# Patient Record
Sex: FEMALE | Race: BLACK OR AFRICAN AMERICAN | NOT HISPANIC OR LATINO | Employment: OTHER | URBAN - METROPOLITAN AREA
[De-identification: names, ages, dates, MRNs, and addresses within clinical notes are randomized per-mention and may not be internally consistent; named-entity substitution may affect disease eponyms.]

---

## 2018-07-25 ENCOUNTER — HOSPITAL ENCOUNTER (INPATIENT)
Facility: HOSPITAL | Age: 83
LOS: 4 days | Discharge: HOME | End: 2018-07-30
Attending: EMERGENCY MEDICINE | Admitting: INTERNAL MEDICINE

## 2018-07-25 ENCOUNTER — APPOINTMENT (OUTPATIENT)
Dept: GENERAL RADIOLOGY | Facility: HOSPITAL | Age: 83
End: 2018-07-25

## 2018-07-25 DIAGNOSIS — R07.89 ATYPICAL CHEST PAIN: ICD-10-CM

## 2018-07-25 DIAGNOSIS — N28.9 ACUTE RENAL INSUFFICIENCY: ICD-10-CM

## 2018-07-25 DIAGNOSIS — I16.0 HYPERTENSIVE URGENCY: ICD-10-CM

## 2018-07-25 DIAGNOSIS — Z86.79 HISTORY OF HYPERTENSION: ICD-10-CM

## 2018-07-25 DIAGNOSIS — I21.4 NON-ST ELEVATED MYOCARDIAL INFARCTION (NON-STEMI) (HCC): Primary | ICD-10-CM

## 2018-07-25 DIAGNOSIS — E11.9 TYPE 2 DIABETES MELLITUS WITHOUT COMPLICATION, WITHOUT LONG-TERM CURRENT USE OF INSULIN (HCC): ICD-10-CM

## 2018-07-25 DIAGNOSIS — I10 ESSENTIAL HYPERTENSION: ICD-10-CM

## 2018-07-25 DIAGNOSIS — F17.200 TOBACCO DEPENDENCE: ICD-10-CM

## 2018-07-25 DIAGNOSIS — E78.2 MIXED HYPERLIPIDEMIA: ICD-10-CM

## 2018-07-25 PROBLEM — E78.5 HYPERLIPEMIA: Status: ACTIVE | Noted: 2018-07-25

## 2018-07-25 PROBLEM — N17.9 AKI (ACUTE KIDNEY INJURY) (HCC): Status: ACTIVE | Noted: 2018-07-25

## 2018-07-25 PROBLEM — Z72.0 TOBACCO ABUSE: Status: ACTIVE | Noted: 2018-07-25

## 2018-07-25 LAB
ALBUMIN SERPL-MCNC: 4.75 G/DL (ref 3.2–4.8)
ALBUMIN/GLOB SERPL: 1.2 G/DL (ref 1.5–2.5)
ALP SERPL-CCNC: 112 U/L (ref 25–100)
ALT SERPL W P-5'-P-CCNC: 20 U/L (ref 7–40)
ANION GAP SERPL CALCULATED.3IONS-SCNC: 10 MMOL/L (ref 3–11)
AST SERPL-CCNC: 49 U/L (ref 0–33)
BASOPHILS # BLD AUTO: 0.03 10*3/MM3 (ref 0–0.2)
BASOPHILS NFR BLD AUTO: 0.3 % (ref 0–1)
BILIRUB SERPL-MCNC: 0.6 MG/DL (ref 0.3–1.2)
BNP SERPL-MCNC: 1415 PG/ML (ref 0–100)
BUN BLD-MCNC: 24 MG/DL (ref 9–23)
BUN/CREAT SERPL: 13.9 (ref 7–25)
CALCIUM SPEC-SCNC: 10 MG/DL (ref 8.7–10.4)
CHLORIDE SERPL-SCNC: 107 MMOL/L (ref 99–109)
CO2 SERPL-SCNC: 23 MMOL/L (ref 20–31)
CREAT BLD-MCNC: 1.73 MG/DL (ref 0.6–1.3)
DEPRECATED RDW RBC AUTO: 46.5 FL (ref 37–54)
EOSINOPHIL # BLD AUTO: 0.16 10*3/MM3 (ref 0–0.3)
EOSINOPHIL NFR BLD AUTO: 1.7 % (ref 0–3)
ERYTHROCYTE [DISTWIDTH] IN BLOOD BY AUTOMATED COUNT: 14.5 % (ref 11.3–14.5)
GFR SERPL CREATININE-BSD FRML MDRD: 34 ML/MIN/1.73
GLOBULIN UR ELPH-MCNC: 4 GM/DL
GLUCOSE BLD-MCNC: 174 MG/DL (ref 70–100)
HCT VFR BLD AUTO: 44.3 % (ref 34.5–44)
HGB BLD-MCNC: 15 G/DL (ref 11.5–15.5)
HOLD SPECIMEN: NORMAL
HOLD SPECIMEN: NORMAL
IMM GRANULOCYTES # BLD: 0.04 10*3/MM3 (ref 0–0.03)
IMM GRANULOCYTES NFR BLD: 0.4 % (ref 0–0.6)
LIPASE SERPL-CCNC: 26 U/L (ref 6–51)
LYMPHOCYTES # BLD AUTO: 3.43 10*3/MM3 (ref 0.6–4.8)
LYMPHOCYTES NFR BLD AUTO: 35.4 % (ref 24–44)
MCH RBC QN AUTO: 29.8 PG (ref 27–31)
MCHC RBC AUTO-ENTMCNC: 33.9 G/DL (ref 32–36)
MCV RBC AUTO: 87.9 FL (ref 80–99)
MONOCYTES # BLD AUTO: 0.4 10*3/MM3 (ref 0–1)
MONOCYTES NFR BLD AUTO: 4.1 % (ref 0–12)
NEUTROPHILS # BLD AUTO: 5.67 10*3/MM3 (ref 1.5–8.3)
NEUTROPHILS NFR BLD AUTO: 58.5 % (ref 41–71)
PLAT MORPH BLD: NORMAL
PLATELET # BLD AUTO: 384 10*3/MM3 (ref 150–450)
PMV BLD AUTO: 11.6 FL (ref 6–12)
POTASSIUM BLD-SCNC: 5.1 MMOL/L (ref 3.5–5.5)
PROT SERPL-MCNC: 8.7 G/DL (ref 5.7–8.2)
RBC # BLD AUTO: 5.04 10*6/MM3 (ref 3.89–5.14)
RBC MORPH BLD: NORMAL
SODIUM BLD-SCNC: 140 MMOL/L (ref 132–146)
TROPONIN I SERPL-MCNC: 0.95 NG/ML (ref 0–0.07)
TROPONIN I SERPL-MCNC: 0.96 NG/ML (ref 0–0.07)
WBC MORPH BLD: NORMAL
WBC NRBC COR # BLD: 9.69 10*3/MM3 (ref 3.5–10.8)
WHOLE BLOOD HOLD SPECIMEN: NORMAL
WHOLE BLOOD HOLD SPECIMEN: NORMAL

## 2018-07-25 PROCEDURE — 85007 BL SMEAR W/DIFF WBC COUNT: CPT | Performed by: EMERGENCY MEDICINE

## 2018-07-25 PROCEDURE — 93005 ELECTROCARDIOGRAM TRACING: CPT | Performed by: PHYSICIAN ASSISTANT

## 2018-07-25 PROCEDURE — 83690 ASSAY OF LIPASE: CPT | Performed by: EMERGENCY MEDICINE

## 2018-07-25 PROCEDURE — 99223 1ST HOSP IP/OBS HIGH 75: CPT | Performed by: FAMILY MEDICINE

## 2018-07-25 PROCEDURE — 84484 ASSAY OF TROPONIN QUANT: CPT

## 2018-07-25 PROCEDURE — 99284 EMERGENCY DEPT VISIT MOD MDM: CPT

## 2018-07-25 PROCEDURE — 80053 COMPREHEN METABOLIC PANEL: CPT | Performed by: EMERGENCY MEDICINE

## 2018-07-25 PROCEDURE — 71045 X-RAY EXAM CHEST 1 VIEW: CPT

## 2018-07-25 PROCEDURE — 99406 BEHAV CHNG SMOKING 3-10 MIN: CPT | Performed by: PHYSICIAN ASSISTANT

## 2018-07-25 PROCEDURE — 93005 ELECTROCARDIOGRAM TRACING: CPT

## 2018-07-25 PROCEDURE — 83880 ASSAY OF NATRIURETIC PEPTIDE: CPT | Performed by: EMERGENCY MEDICINE

## 2018-07-25 PROCEDURE — 25010000002 ENOXAPARIN PER 10 MG: Performed by: PHYSICIAN ASSISTANT

## 2018-07-25 PROCEDURE — 93005 ELECTROCARDIOGRAM TRACING: CPT | Performed by: EMERGENCY MEDICINE

## 2018-07-25 PROCEDURE — 85025 COMPLETE CBC W/AUTO DIFF WBC: CPT | Performed by: EMERGENCY MEDICINE

## 2018-07-25 RX ORDER — METOPROLOL TARTRATE 5 MG/5ML
5 INJECTION INTRAVENOUS ONCE
Status: COMPLETED | OUTPATIENT
Start: 2018-07-25 | End: 2018-07-25

## 2018-07-25 RX ORDER — CLONIDINE HYDROCHLORIDE 0.1 MG/1
0.2 TABLET ORAL ONCE
Status: COMPLETED | OUTPATIENT
Start: 2018-07-25 | End: 2018-07-25

## 2018-07-25 RX ORDER — CLOPIDOGREL BISULFATE 75 MG/1
600 TABLET ORAL ONCE
Status: COMPLETED | OUTPATIENT
Start: 2018-07-25 | End: 2018-07-25

## 2018-07-25 RX ORDER — ATENOLOL 25 MG/1
25 TABLET ORAL DAILY
COMMUNITY
End: 2018-07-30 | Stop reason: HOSPADM

## 2018-07-25 RX ORDER — SODIUM CHLORIDE 0.9 % (FLUSH) 0.9 %
10 SYRINGE (ML) INJECTION AS NEEDED
Status: DISCONTINUED | OUTPATIENT
Start: 2018-07-25 | End: 2018-07-30 | Stop reason: HOSPADM

## 2018-07-25 RX ORDER — AMLODIPINE BESYLATE 10 MG/1
10 TABLET ORAL DAILY
COMMUNITY
End: 2018-08-29 | Stop reason: SDUPTHER

## 2018-07-25 RX ORDER — ASPIRIN 81 MG/1
324 TABLET, CHEWABLE ORAL ONCE
Status: COMPLETED | OUTPATIENT
Start: 2018-07-25 | End: 2018-07-25

## 2018-07-25 RX ADMIN — METOPROLOL TARTRATE 5 MG: 1 INJECTION, SOLUTION INTRAVENOUS at 20:44

## 2018-07-25 RX ADMIN — ASPIRIN 81 MG 243 MG: 81 TABLET ORAL at 19:56

## 2018-07-25 RX ADMIN — ENOXAPARIN SODIUM 60 MG: 60 INJECTION SUBCUTANEOUS at 20:44

## 2018-07-25 RX ADMIN — CLOPIDOGREL BISULFATE 600 MG: 75 TABLET ORAL at 20:43

## 2018-07-25 RX ADMIN — CLONIDINE HYDROCHLORIDE 0.2 MG: 0.1 TABLET ORAL at 20:15

## 2018-07-26 ENCOUNTER — APPOINTMENT (OUTPATIENT)
Dept: CARDIOLOGY | Facility: HOSPITAL | Age: 83
End: 2018-07-26

## 2018-07-26 PROBLEM — I16.1 HYPERTENSIVE EMERGENCY: Status: ACTIVE | Noted: 2018-07-25

## 2018-07-26 PROBLEM — I21.4 NON-ST ELEVATED MYOCARDIAL INFARCTION (NON-STEMI) (HCC): Status: ACTIVE | Noted: 2018-07-26

## 2018-07-26 LAB
ANION GAP SERPL CALCULATED.3IONS-SCNC: 14 MMOL/L (ref 3–11)
APTT PPP: 30.1 SECONDS (ref 55–70)
APTT PPP: 58 SECONDS (ref 55–70)
ARTICHOKE IGE QN: 184 MG/DL (ref 0–130)
BASOPHILS # BLD AUTO: 0.04 10*3/MM3 (ref 0–0.2)
BASOPHILS NFR BLD AUTO: 0.5 % (ref 0–1)
BH CV ECHO MEAS - AO ROOT AREA (BSA CORRECTED): 1.7
BH CV ECHO MEAS - AO ROOT AREA: 6 CM^2
BH CV ECHO MEAS - AO ROOT DIAM: 2.8 CM
BH CV ECHO MEAS - BSA(HAYCOCK): 1.6 M^2
BH CV ECHO MEAS - BSA: 1.6 M^2
BH CV ECHO MEAS - BZI_BMI: 23.4 KILOGRAMS/M^2
BH CV ECHO MEAS - BZI_METRIC_HEIGHT: 157.5 CM
BH CV ECHO MEAS - BZI_METRIC_WEIGHT: 58.1 KG
BH CV ECHO MEAS - CONTRAST EF (2CH): 29 ML/M^2
BH CV ECHO MEAS - CONTRAST EF 4CH: 28.1 ML/M^2
BH CV ECHO MEAS - EDV(CUBED): 37.9 ML
BH CV ECHO MEAS - EDV(MOD-SP2): 62 ML
BH CV ECHO MEAS - EDV(MOD-SP4): 57 ML
BH CV ECHO MEAS - EDV(TEICH): 46 ML
BH CV ECHO MEAS - EF(CUBED): 54 %
BH CV ECHO MEAS - EF(MOD-BP): 27 %
BH CV ECHO MEAS - EF(MOD-SP2): 29 %
BH CV ECHO MEAS - EF(MOD-SP4): 28.1 %
BH CV ECHO MEAS - EF(TEICH): 47 %
BH CV ECHO MEAS - ESV(CUBED): 17.4 ML
BH CV ECHO MEAS - ESV(MOD-SP2): 44 ML
BH CV ECHO MEAS - ESV(MOD-SP4): 41 ML
BH CV ECHO MEAS - ESV(TEICH): 24.4 ML
BH CV ECHO MEAS - FS: 22.8 %
BH CV ECHO MEAS - IVS/LVPW: 0.9
BH CV ECHO MEAS - IVSD: 1.2 CM
BH CV ECHO MEAS - LA DIMENSION: 3.5 CM
BH CV ECHO MEAS - LA/AO: 1.3
BH CV ECHO MEAS - LAT PEAK E' VEL: 4 CM/SEC
BH CV ECHO MEAS - LV DIASTOLIC VOL/BSA (35-75): 36 ML/M^2
BH CV ECHO MEAS - LV MASS(C)D: 142.2 GRAMS
BH CV ECHO MEAS - LV MASS(C)DI: 89.9 GRAMS/M^2
BH CV ECHO MEAS - LV MAX PG: 1.6 MMHG
BH CV ECHO MEAS - LV MEAN PG: 1 MMHG
BH CV ECHO MEAS - LV SYSTOLIC VOL/BSA (12-30): 25.9 ML/M^2
BH CV ECHO MEAS - LV V1 MAX: 63.2 CM/SEC
BH CV ECHO MEAS - LV V1 MEAN: 47.4 CM/SEC
BH CV ECHO MEAS - LV V1 VTI: 14.8 CM
BH CV ECHO MEAS - LVIDD: 3.4 CM
BH CV ECHO MEAS - LVIDS: 2.6 CM
BH CV ECHO MEAS - LVLD AP2: 7.1 CM
BH CV ECHO MEAS - LVLD AP4: 7.2 CM
BH CV ECHO MEAS - LVLS AP2: 6.7 CM
BH CV ECHO MEAS - LVLS AP4: 6.6 CM
BH CV ECHO MEAS - LVOT AREA (M): 3.1 CM^2
BH CV ECHO MEAS - LVOT AREA: 3.1 CM^2
BH CV ECHO MEAS - LVOT DIAM: 2 CM
BH CV ECHO MEAS - LVPWD: 1.4 CM
BH CV ECHO MEAS - MED PEAK E' VEL: 4.15 CM/SEC
BH CV ECHO MEAS - MV A MAX VEL: 106.6 CM/SEC
BH CV ECHO MEAS - MV E MAX VEL: 74 CM/SEC
BH CV ECHO MEAS - MV E/A: 0.69
BH CV ECHO MEAS - RVDD: 2.2 CM
BH CV ECHO MEAS - SI(CUBED): 12.9 ML/M^2
BH CV ECHO MEAS - SI(LVOT): 28.6 ML/M^2
BH CV ECHO MEAS - SI(MOD-SP2): 11.4 ML/M^2
BH CV ECHO MEAS - SI(MOD-SP4): 10.1 ML/M^2
BH CV ECHO MEAS - SI(TEICH): 13.7 ML/M^2
BH CV ECHO MEAS - SV(CUBED): 20.4 ML
BH CV ECHO MEAS - SV(LVOT): 45.3 ML
BH CV ECHO MEAS - SV(MOD-SP2): 18 ML
BH CV ECHO MEAS - SV(MOD-SP4): 16 ML
BH CV ECHO MEAS - SV(TEICH): 21.6 ML
BH CV ECHO MEAS - TAPSE (>1.6): 1.1 CM2
BH CV ECHO MEASUREMENTS AVERAGE E/E' RATIO: 18.16
BH CV VAS BP LEFT ARM: NORMAL MMHG
BH CV XLRA - RV BASE: 2.7 CM
BH CV XLRA - RV LENGTH: 5.7 CM
BH CV XLRA - RV MID: 1.9 CM
BH CV XLRA - TDI S': 6.92 CM/SEC
BUN BLD-MCNC: 23 MG/DL (ref 9–23)
BUN/CREAT SERPL: 15.9 (ref 7–25)
CALCIUM SPEC-SCNC: 9.4 MG/DL (ref 8.7–10.4)
CHLORIDE SERPL-SCNC: 111 MMOL/L (ref 99–109)
CHOLEST SERPL-MCNC: 237 MG/DL (ref 0–200)
CO2 SERPL-SCNC: 17 MMOL/L (ref 20–31)
CREAT BLD-MCNC: 1.45 MG/DL (ref 0.6–1.3)
DEPRECATED RDW RBC AUTO: 46.5 FL (ref 37–54)
EOSINOPHIL # BLD AUTO: 0.27 10*3/MM3 (ref 0–0.3)
EOSINOPHIL NFR BLD AUTO: 3.4 % (ref 0–3)
ERYTHROCYTE [DISTWIDTH] IN BLOOD BY AUTOMATED COUNT: 14.5 % (ref 11.3–14.5)
GFR SERPL CREATININE-BSD FRML MDRD: 41 ML/MIN/1.73
GLUCOSE BLD-MCNC: 153 MG/DL (ref 70–100)
GLUCOSE BLDC GLUCOMTR-MCNC: 155 MG/DL (ref 70–130)
GLUCOSE BLDC GLUCOMTR-MCNC: 168 MG/DL (ref 70–130)
GLUCOSE BLDC GLUCOMTR-MCNC: 223 MG/DL (ref 70–130)
GLUCOSE BLDC GLUCOMTR-MCNC: 244 MG/DL (ref 70–130)
HBA1C MFR BLD: 9.2 % (ref 4.8–5.6)
HCT VFR BLD AUTO: 35.8 % (ref 34.5–44)
HDLC SERPL-MCNC: 40 MG/DL (ref 40–60)
HGB BLD-MCNC: 11.8 G/DL (ref 11.5–15.5)
IMM GRANULOCYTES # BLD: 0.02 10*3/MM3 (ref 0–0.03)
IMM GRANULOCYTES NFR BLD: 0.3 % (ref 0–0.6)
INR PPP: 1.17 (ref 0.91–1.09)
LYMPHOCYTES # BLD AUTO: 2.81 10*3/MM3 (ref 0.6–4.8)
LYMPHOCYTES NFR BLD AUTO: 35.3 % (ref 24–44)
MAGNESIUM SERPL-MCNC: 1.9 MG/DL (ref 1.3–2.7)
MAXIMAL PREDICTED HEART RATE: 134 BPM
MCH RBC QN AUTO: 29.1 PG (ref 27–31)
MCHC RBC AUTO-ENTMCNC: 33 G/DL (ref 32–36)
MCV RBC AUTO: 88.4 FL (ref 80–99)
MONOCYTES # BLD AUTO: 0.39 10*3/MM3 (ref 0–1)
MONOCYTES NFR BLD AUTO: 4.9 % (ref 0–12)
NEUTROPHILS # BLD AUTO: 4.46 10*3/MM3 (ref 1.5–8.3)
NEUTROPHILS NFR BLD AUTO: 55.9 % (ref 41–71)
PLATELET # BLD AUTO: 322 10*3/MM3 (ref 150–450)
PMV BLD AUTO: 11.5 FL (ref 6–12)
POTASSIUM BLD-SCNC: 4.4 MMOL/L (ref 3.5–5.5)
PROTHROMBIN TIME: 12.3 SECONDS (ref 9.6–11.5)
RBC # BLD AUTO: 4.05 10*6/MM3 (ref 3.89–5.14)
SODIUM BLD-SCNC: 142 MMOL/L (ref 132–146)
STRESS TARGET HR: 114 BPM
TRIGL SERPL-MCNC: 179 MG/DL (ref 0–150)
TROPONIN I SERPL-MCNC: 1.22 NG/ML
TSH SERPL DL<=0.05 MIU/L-ACNC: 1.08 MIU/ML (ref 0.35–5.35)
WBC NRBC COR # BLD: 7.97 10*3/MM3 (ref 3.5–10.8)

## 2018-07-26 PROCEDURE — 63710000001 INSULIN LISPRO (HUMAN) PER 5 UNITS: Performed by: PHYSICIAN ASSISTANT

## 2018-07-26 PROCEDURE — 25010000002 HEPARIN (PORCINE) PER 1000 UNITS: Performed by: INTERNAL MEDICINE

## 2018-07-26 PROCEDURE — 85610 PROTHROMBIN TIME: CPT | Performed by: INTERNAL MEDICINE

## 2018-07-26 PROCEDURE — 93010 ELECTROCARDIOGRAM REPORT: CPT | Performed by: INTERNAL MEDICINE

## 2018-07-26 PROCEDURE — 83735 ASSAY OF MAGNESIUM: CPT | Performed by: PHYSICIAN ASSISTANT

## 2018-07-26 PROCEDURE — 99291 CRITICAL CARE FIRST HOUR: CPT | Performed by: INTERNAL MEDICINE

## 2018-07-26 PROCEDURE — 83036 HEMOGLOBIN GLYCOSYLATED A1C: CPT | Performed by: PHYSICIAN ASSISTANT

## 2018-07-26 PROCEDURE — 80048 BASIC METABOLIC PNL TOTAL CA: CPT | Performed by: INTERNAL MEDICINE

## 2018-07-26 PROCEDURE — 80061 LIPID PANEL: CPT | Performed by: PHYSICIAN ASSISTANT

## 2018-07-26 PROCEDURE — 93306 TTE W/DOPPLER COMPLETE: CPT

## 2018-07-26 PROCEDURE — 99222 1ST HOSP IP/OBS MODERATE 55: CPT | Performed by: INTERNAL MEDICINE

## 2018-07-26 PROCEDURE — 85025 COMPLETE CBC W/AUTO DIFF WBC: CPT | Performed by: INTERNAL MEDICINE

## 2018-07-26 PROCEDURE — 85730 THROMBOPLASTIN TIME PARTIAL: CPT | Performed by: INTERNAL MEDICINE

## 2018-07-26 PROCEDURE — 82962 GLUCOSE BLOOD TEST: CPT

## 2018-07-26 PROCEDURE — 93005 ELECTROCARDIOGRAM TRACING: CPT | Performed by: PHYSICIAN ASSISTANT

## 2018-07-26 PROCEDURE — 93306 TTE W/DOPPLER COMPLETE: CPT | Performed by: INTERNAL MEDICINE

## 2018-07-26 PROCEDURE — 84443 ASSAY THYROID STIM HORMONE: CPT | Performed by: PHYSICIAN ASSISTANT

## 2018-07-26 PROCEDURE — 84484 ASSAY OF TROPONIN QUANT: CPT | Performed by: PHYSICIAN ASSISTANT

## 2018-07-26 RX ORDER — AMLODIPINE BESYLATE 10 MG/1
10 TABLET ORAL DAILY
Status: DISCONTINUED | OUTPATIENT
Start: 2018-07-26 | End: 2018-07-30 | Stop reason: HOSPADM

## 2018-07-26 RX ORDER — CARVEDILOL 6.25 MG/1
6.25 TABLET ORAL EVERY 12 HOURS SCHEDULED
Status: DISCONTINUED | OUTPATIENT
Start: 2018-07-26 | End: 2018-07-30 | Stop reason: HOSPADM

## 2018-07-26 RX ORDER — NICOTINE POLACRILEX 4 MG
15 LOZENGE BUCCAL
Status: DISCONTINUED | OUTPATIENT
Start: 2018-07-26 | End: 2018-07-30 | Stop reason: HOSPADM

## 2018-07-26 RX ORDER — SODIUM CHLORIDE 0.9 % (FLUSH) 0.9 %
1-10 SYRINGE (ML) INJECTION AS NEEDED
Status: DISCONTINUED | OUTPATIENT
Start: 2018-07-26 | End: 2018-07-30 | Stop reason: HOSPADM

## 2018-07-26 RX ORDER — ASPIRIN 81 MG/1
81 TABLET, CHEWABLE ORAL DAILY
Status: DISCONTINUED | OUTPATIENT
Start: 2018-07-26 | End: 2018-07-27

## 2018-07-26 RX ORDER — NITROGLYCERIN 20 MG/100ML
5-200 INJECTION INTRAVENOUS
Status: DISCONTINUED | OUTPATIENT
Start: 2018-07-26 | End: 2018-07-28

## 2018-07-26 RX ORDER — ATENOLOL 25 MG/1
25 TABLET ORAL DAILY
Status: DISCONTINUED | OUTPATIENT
Start: 2018-07-26 | End: 2018-07-26

## 2018-07-26 RX ORDER — SODIUM CHLORIDE 9 MG/ML
75 INJECTION, SOLUTION INTRAVENOUS CONTINUOUS
Status: ACTIVE | OUTPATIENT
Start: 2018-07-26 | End: 2018-07-26

## 2018-07-26 RX ORDER — ACETAMINOPHEN 325 MG/1
650 TABLET ORAL EVERY 4 HOURS PRN
Status: DISCONTINUED | OUTPATIENT
Start: 2018-07-26 | End: 2018-07-30 | Stop reason: HOSPADM

## 2018-07-26 RX ORDER — CLOPIDOGREL BISULFATE 75 MG/1
75 TABLET ORAL DAILY
Status: DISCONTINUED | OUTPATIENT
Start: 2018-07-26 | End: 2018-07-30 | Stop reason: HOSPADM

## 2018-07-26 RX ORDER — ATORVASTATIN CALCIUM 40 MG/1
80 TABLET, FILM COATED ORAL NIGHTLY
Status: DISCONTINUED | OUTPATIENT
Start: 2018-07-26 | End: 2018-07-26

## 2018-07-26 RX ORDER — ASPIRIN 81 MG/1
81 TABLET ORAL DAILY
Status: DISCONTINUED | OUTPATIENT
Start: 2018-07-27 | End: 2018-07-30 | Stop reason: HOSPADM

## 2018-07-26 RX ORDER — ATORVASTATIN CALCIUM 40 MG/1
40 TABLET, FILM COATED ORAL NIGHTLY
Status: DISCONTINUED | OUTPATIENT
Start: 2018-07-26 | End: 2018-07-28

## 2018-07-26 RX ORDER — CARVEDILOL 12.5 MG/1
12.5 TABLET ORAL EVERY 12 HOURS SCHEDULED
Status: DISCONTINUED | OUTPATIENT
Start: 2018-07-26 | End: 2018-07-26

## 2018-07-26 RX ORDER — CLONIDINE HYDROCHLORIDE 0.2 MG/1
0.2 TABLET ORAL ONCE
Status: COMPLETED | OUTPATIENT
Start: 2018-07-26 | End: 2018-07-26

## 2018-07-26 RX ORDER — DEXTROSE MONOHYDRATE 25 G/50ML
25 INJECTION, SOLUTION INTRAVENOUS
Status: DISCONTINUED | OUTPATIENT
Start: 2018-07-26 | End: 2018-07-30 | Stop reason: HOSPADM

## 2018-07-26 RX ORDER — ASPIRIN 81 MG/1
81 TABLET, CHEWABLE ORAL DAILY
COMMUNITY

## 2018-07-26 RX ADMIN — INSULIN LISPRO 2 UNITS: 100 INJECTION, SOLUTION INTRAVENOUS; SUBCUTANEOUS at 08:40

## 2018-07-26 RX ADMIN — HEPARIN SODIUM 12 UNITS/KG/HR: 10000 INJECTION, SOLUTION INTRAVENOUS at 15:39

## 2018-07-26 RX ADMIN — ASPIRIN 81 MG 81 MG: 81 TABLET ORAL at 08:40

## 2018-07-26 RX ADMIN — INSULIN LISPRO 3 UNITS: 100 INJECTION, SOLUTION INTRAVENOUS; SUBCUTANEOUS at 13:04

## 2018-07-26 RX ADMIN — NITROGLYCERIN 1 INCH: 20 OINTMENT TOPICAL at 04:03

## 2018-07-26 RX ADMIN — INSULIN LISPRO 2 UNITS: 100 INJECTION, SOLUTION INTRAVENOUS; SUBCUTANEOUS at 17:54

## 2018-07-26 RX ADMIN — NITROGLYCERIN 5 MCG/MIN: 20 INJECTION INTRAVENOUS at 10:23

## 2018-07-26 RX ADMIN — AMLODIPINE BESYLATE 10 MG: 10 TABLET ORAL at 08:40

## 2018-07-26 RX ADMIN — ATENOLOL 25 MG: 25 TABLET ORAL at 08:40

## 2018-07-26 RX ADMIN — CLONIDINE HYDROCHLORIDE 0.2 MG: 0.2 TABLET ORAL at 05:28

## 2018-07-26 RX ADMIN — CLOPIDOGREL BISULFATE 75 MG: 75 TABLET ORAL at 11:06

## 2018-07-26 RX ADMIN — INSULIN LISPRO 3 UNITS: 100 INJECTION, SOLUTION INTRAVENOUS; SUBCUTANEOUS at 20:28

## 2018-07-27 LAB
ANION GAP SERPL CALCULATED.3IONS-SCNC: 8 MMOL/L (ref 3–11)
APTT PPP: 70.9 SECONDS (ref 55–70)
APTT PPP: 72.1 SECONDS (ref 55–70)
BUN BLD-MCNC: 26 MG/DL (ref 9–23)
BUN/CREAT SERPL: 18.2 (ref 7–25)
CALCIUM SPEC-SCNC: 9 MG/DL (ref 8.7–10.4)
CHLORIDE SERPL-SCNC: 111 MMOL/L (ref 99–109)
CO2 SERPL-SCNC: 20 MMOL/L (ref 20–31)
CREAT BLD-MCNC: 1.43 MG/DL (ref 0.6–1.3)
GFR SERPL CREATININE-BSD FRML MDRD: 42 ML/MIN/1.73
GLUCOSE BLD-MCNC: 106 MG/DL (ref 70–100)
GLUCOSE BLDC GLUCOMTR-MCNC: 107 MG/DL (ref 70–130)
GLUCOSE BLDC GLUCOMTR-MCNC: 180 MG/DL (ref 70–130)
GLUCOSE BLDC GLUCOMTR-MCNC: 280 MG/DL (ref 70–130)
GLUCOSE BLDC GLUCOMTR-MCNC: 360 MG/DL (ref 70–130)
POTASSIUM BLD-SCNC: 4.1 MMOL/L (ref 3.5–5.5)
SODIUM BLD-SCNC: 139 MMOL/L (ref 132–146)
TROPONIN I SERPL-MCNC: 1.04 NG/ML

## 2018-07-27 PROCEDURE — 4A023N7 MEASUREMENT OF CARDIAC SAMPLING AND PRESSURE, LEFT HEART, PERCUTANEOUS APPROACH: ICD-10-PCS | Performed by: INTERNAL MEDICINE

## 2018-07-27 PROCEDURE — C9600 PERC DRUG-EL COR STENT SING: HCPCS | Performed by: INTERNAL MEDICINE

## 2018-07-27 PROCEDURE — 027034Z DILATION OF CORONARY ARTERY, ONE ARTERY WITH DRUG-ELUTING INTRALUMINAL DEVICE, PERCUTANEOUS APPROACH: ICD-10-PCS | Performed by: INTERNAL MEDICINE

## 2018-07-27 PROCEDURE — 99232 SBSQ HOSP IP/OBS MODERATE 35: CPT | Performed by: NURSE PRACTITIONER

## 2018-07-27 PROCEDURE — 80048 BASIC METABOLIC PNL TOTAL CA: CPT | Performed by: INTERNAL MEDICINE

## 2018-07-27 PROCEDURE — 92978 ENDOLUMINL IVUS OCT C 1ST: CPT | Performed by: INTERNAL MEDICINE

## 2018-07-27 PROCEDURE — 82962 GLUCOSE BLOOD TEST: CPT

## 2018-07-27 PROCEDURE — 63710000001 PREDNISONE PER 5 MG: Performed by: INTERNAL MEDICINE

## 2018-07-27 PROCEDURE — 25010000002 HYDRALAZINE PER 20 MG: Performed by: INTERNAL MEDICINE

## 2018-07-27 PROCEDURE — 92928 PRQ TCAT PLMT NTRAC ST 1 LES: CPT | Performed by: INTERNAL MEDICINE

## 2018-07-27 PROCEDURE — 63710000001 PREDNISONE PER 1 MG: Performed by: INTERNAL MEDICINE

## 2018-07-27 PROCEDURE — C1725 CATH, TRANSLUMIN NON-LASER: HCPCS | Performed by: INTERNAL MEDICINE

## 2018-07-27 PROCEDURE — C1887 CATHETER, GUIDING: HCPCS | Performed by: INTERNAL MEDICINE

## 2018-07-27 PROCEDURE — 0 IOPAMIDOL PER 1 ML: Performed by: INTERNAL MEDICINE

## 2018-07-27 PROCEDURE — C1894 INTRO/SHEATH, NON-LASER: HCPCS | Performed by: INTERNAL MEDICINE

## 2018-07-27 PROCEDURE — 99024 POSTOP FOLLOW-UP VISIT: CPT | Performed by: INTERNAL MEDICINE

## 2018-07-27 PROCEDURE — 25010000002 DIPHENHYDRAMINE PER 50 MG: Performed by: INTERNAL MEDICINE

## 2018-07-27 PROCEDURE — C1769 GUIDE WIRE: HCPCS | Performed by: INTERNAL MEDICINE

## 2018-07-27 PROCEDURE — C1753 CATH, INTRAVAS ULTRASOUND: HCPCS | Performed by: INTERNAL MEDICINE

## 2018-07-27 PROCEDURE — 93458 L HRT ARTERY/VENTRICLE ANGIO: CPT | Performed by: INTERNAL MEDICINE

## 2018-07-27 PROCEDURE — 25010000002 BIVALIRUDIN TRIFLUOROACETATE 250 MG RECONSTITUTED SOLUTION 1 EACH VIAL: Performed by: INTERNAL MEDICINE

## 2018-07-27 PROCEDURE — 84484 ASSAY OF TROPONIN QUANT: CPT | Performed by: INTERNAL MEDICINE

## 2018-07-27 PROCEDURE — 25010000002 FENTANYL CITRATE (PF) 100 MCG/2ML SOLUTION: Performed by: INTERNAL MEDICINE

## 2018-07-27 PROCEDURE — 25010000002 MIDAZOLAM PER 1 MG: Performed by: INTERNAL MEDICINE

## 2018-07-27 PROCEDURE — B240ZZ3 ULTRASONOGRAPHY OF SINGLE CORONARY ARTERY, INTRAVASCULAR: ICD-10-PCS | Performed by: INTERNAL MEDICINE

## 2018-07-27 PROCEDURE — 85730 THROMBOPLASTIN TIME PARTIAL: CPT

## 2018-07-27 PROCEDURE — B2111ZZ FLUOROSCOPY OF MULTIPLE CORONARY ARTERIES USING LOW OSMOLAR CONTRAST: ICD-10-PCS | Performed by: INTERNAL MEDICINE

## 2018-07-27 PROCEDURE — 75630 X-RAY AORTA LEG ARTERIES: CPT | Performed by: INTERNAL MEDICINE

## 2018-07-27 PROCEDURE — C1874 STENT, COATED/COV W/DEL SYS: HCPCS | Performed by: INTERNAL MEDICINE

## 2018-07-27 DEVICE — XIENCE SIERRA™ EVEROLIMUS ELUTING CORONARY STENT SYSTEM 2.50 MM X 38 MM / RAPID-EXCHANGE
Type: IMPLANTABLE DEVICE | Status: FUNCTIONAL
Brand: XIENCE SIERRA™

## 2018-07-27 RX ORDER — LIDOCAINE HYDROCHLORIDE 10 MG/ML
INJECTION, SOLUTION EPIDURAL; INFILTRATION; INTRACAUDAL; PERINEURAL AS NEEDED
Status: DISCONTINUED | OUTPATIENT
Start: 2018-07-27 | End: 2018-07-27 | Stop reason: HOSPADM

## 2018-07-27 RX ORDER — SODIUM CHLORIDE 9 MG/ML
250 INJECTION, SOLUTION INTRAVENOUS ONCE AS NEEDED
Status: DISCONTINUED | OUTPATIENT
Start: 2018-07-27 | End: 2018-07-30 | Stop reason: HOSPADM

## 2018-07-27 RX ORDER — SODIUM CHLORIDE 9 MG/ML
1 INJECTION, SOLUTION INTRAVENOUS CONTINUOUS
Status: ACTIVE | OUTPATIENT
Start: 2018-07-27 | End: 2018-07-27

## 2018-07-27 RX ORDER — MIDAZOLAM HYDROCHLORIDE 1 MG/ML
INJECTION INTRAMUSCULAR; INTRAVENOUS AS NEEDED
Status: DISCONTINUED | OUTPATIENT
Start: 2018-07-27 | End: 2018-07-27 | Stop reason: HOSPADM

## 2018-07-27 RX ORDER — DIPHENHYDRAMINE HYDROCHLORIDE 50 MG/ML
50 INJECTION INTRAMUSCULAR; INTRAVENOUS ONCE
Status: COMPLETED | OUTPATIENT
Start: 2018-07-27 | End: 2018-07-27

## 2018-07-27 RX ORDER — FENTANYL CITRATE 50 UG/ML
INJECTION, SOLUTION INTRAMUSCULAR; INTRAVENOUS AS NEEDED
Status: DISCONTINUED | OUTPATIENT
Start: 2018-07-27 | End: 2018-07-27 | Stop reason: HOSPADM

## 2018-07-27 RX ORDER — HYDRALAZINE HYDROCHLORIDE 20 MG/ML
INJECTION INTRAMUSCULAR; INTRAVENOUS AS NEEDED
Status: DISCONTINUED | OUTPATIENT
Start: 2018-07-27 | End: 2018-07-27 | Stop reason: HOSPADM

## 2018-07-27 RX ORDER — SODIUM CHLORIDE 9 MG/ML
INJECTION, SOLUTION INTRAVENOUS CONTINUOUS PRN
Status: COMPLETED | OUTPATIENT
Start: 2018-07-27 | End: 2018-07-27

## 2018-07-27 RX ORDER — ISOSORBIDE MONONITRATE 30 MG/1
30 TABLET, EXTENDED RELEASE ORAL
Status: DISCONTINUED | OUTPATIENT
Start: 2018-07-27 | End: 2018-07-28

## 2018-07-27 RX ORDER — HYDRALAZINE HYDROCHLORIDE 25 MG/1
25 TABLET, FILM COATED ORAL EVERY 8 HOURS SCHEDULED
Status: DISCONTINUED | OUTPATIENT
Start: 2018-07-27 | End: 2018-07-29

## 2018-07-27 RX ADMIN — HYDRALAZINE HYDROCHLORIDE 25 MG: 25 TABLET ORAL at 22:04

## 2018-07-27 RX ADMIN — CARVEDILOL 6.25 MG: 6.25 TABLET, FILM COATED ORAL at 20:47

## 2018-07-27 RX ADMIN — NITROGLYCERIN 70 MCG/MIN: 20 INJECTION INTRAVENOUS at 08:56

## 2018-07-27 RX ADMIN — PREDNISONE 50 MG: 20 TABLET ORAL at 08:57

## 2018-07-27 RX ADMIN — ATORVASTATIN CALCIUM 40 MG: 40 TABLET, FILM COATED ORAL at 20:47

## 2018-07-27 RX ADMIN — INSULIN LISPRO 6 UNITS: 100 INJECTION, SOLUTION INTRAVENOUS; SUBCUTANEOUS at 20:47

## 2018-07-27 RX ADMIN — NITROGLYCERIN 50 MCG/MIN: 20 INJECTION INTRAVENOUS at 18:59

## 2018-07-27 RX ADMIN — CARVEDILOL 6.25 MG: 6.25 TABLET, FILM COATED ORAL at 08:57

## 2018-07-27 RX ADMIN — PREDNISONE 50 MG: 20 TABLET ORAL at 12:46

## 2018-07-27 RX ADMIN — INSULIN LISPRO 4 UNITS: 100 INJECTION, SOLUTION INTRAVENOUS; SUBCUTANEOUS at 17:43

## 2018-07-27 RX ADMIN — ASPIRIN 81 MG 81 MG: 81 TABLET ORAL at 08:57

## 2018-07-27 RX ADMIN — HYDRALAZINE HYDROCHLORIDE 25 MG: 25 TABLET ORAL at 16:03

## 2018-07-27 RX ADMIN — SODIUM CHLORIDE 1 ML/KG/HR: 9 INJECTION, SOLUTION INTRAVENOUS at 15:11

## 2018-07-27 RX ADMIN — CLOPIDOGREL BISULFATE 75 MG: 75 TABLET ORAL at 08:57

## 2018-07-27 RX ADMIN — ISOSORBIDE MONONITRATE 30 MG: 30 TABLET, EXTENDED RELEASE ORAL at 16:08

## 2018-07-27 RX ADMIN — ASPIRIN 81 MG: 81 TABLET, COATED ORAL at 09:01

## 2018-07-27 RX ADMIN — AMLODIPINE BESYLATE 10 MG: 10 TABLET ORAL at 08:57

## 2018-07-27 RX ADMIN — DIPHENHYDRAMINE HYDROCHLORIDE 50 MG: 50 INJECTION INTRAMUSCULAR; INTRAVENOUS at 12:46

## 2018-07-28 ENCOUNTER — APPOINTMENT (OUTPATIENT)
Dept: GENERAL RADIOLOGY | Facility: HOSPITAL | Age: 83
End: 2018-07-28

## 2018-07-28 PROBLEM — I16.1 HYPERTENSIVE EMERGENCY: Status: RESOLVED | Noted: 2018-07-25 | Resolved: 2018-07-28

## 2018-07-28 LAB
ANION GAP SERPL CALCULATED.3IONS-SCNC: 6 MMOL/L (ref 3–11)
ARTICHOKE IGE QN: 166 MG/DL (ref 0–130)
BNP SERPL-MCNC: 844 PG/ML (ref 0–100)
BUN BLD-MCNC: 35 MG/DL (ref 9–23)
BUN/CREAT SERPL: 23 (ref 7–25)
CALCIUM SPEC-SCNC: 8.3 MG/DL (ref 8.7–10.4)
CHLORIDE SERPL-SCNC: 110 MMOL/L (ref 99–109)
CHOLEST SERPL-MCNC: 210 MG/DL (ref 0–200)
CO2 SERPL-SCNC: 20 MMOL/L (ref 20–31)
CREAT BLD-MCNC: 1.52 MG/DL (ref 0.6–1.3)
DEPRECATED RDW RBC AUTO: 45.4 FL (ref 37–54)
ERYTHROCYTE [DISTWIDTH] IN BLOOD BY AUTOMATED COUNT: 14.5 % (ref 11.3–14.5)
GFR SERPL CREATININE-BSD FRML MDRD: 39 ML/MIN/1.73
GLUCOSE BLD-MCNC: 328 MG/DL (ref 70–100)
GLUCOSE BLDC GLUCOMTR-MCNC: 129 MG/DL (ref 70–130)
GLUCOSE BLDC GLUCOMTR-MCNC: 236 MG/DL (ref 70–130)
GLUCOSE BLDC GLUCOMTR-MCNC: 252 MG/DL (ref 70–130)
GLUCOSE BLDC GLUCOMTR-MCNC: 327 MG/DL (ref 70–130)
HCT VFR BLD AUTO: 30.8 % (ref 34.5–44)
HDLC SERPL-MCNC: 48 MG/DL (ref 40–60)
HGB BLD-MCNC: 10.2 G/DL (ref 11.5–15.5)
MCH RBC QN AUTO: 28.9 PG (ref 27–31)
MCHC RBC AUTO-ENTMCNC: 33.1 G/DL (ref 32–36)
MCV RBC AUTO: 87.3 FL (ref 80–99)
PLATELET # BLD AUTO: 313 10*3/MM3 (ref 150–450)
PMV BLD AUTO: 11.3 FL (ref 6–12)
POTASSIUM BLD-SCNC: 4.7 MMOL/L (ref 3.5–5.5)
RBC # BLD AUTO: 3.53 10*6/MM3 (ref 3.89–5.14)
SODIUM BLD-SCNC: 136 MMOL/L (ref 132–146)
TRIGL SERPL-MCNC: 63 MG/DL (ref 0–150)
WBC NRBC COR # BLD: 9.4 10*3/MM3 (ref 3.5–10.8)

## 2018-07-28 PROCEDURE — 25010000002 FUROSEMIDE PER 20 MG: Performed by: NURSE PRACTITIONER

## 2018-07-28 PROCEDURE — 25010000002 FUROSEMIDE PER 20 MG

## 2018-07-28 PROCEDURE — 71045 X-RAY EXAM CHEST 1 VIEW: CPT

## 2018-07-28 PROCEDURE — 85027 COMPLETE CBC AUTOMATED: CPT | Performed by: INTERNAL MEDICINE

## 2018-07-28 PROCEDURE — 80048 BASIC METABOLIC PNL TOTAL CA: CPT | Performed by: INTERNAL MEDICINE

## 2018-07-28 PROCEDURE — 99291 CRITICAL CARE FIRST HOUR: CPT | Performed by: NURSE PRACTITIONER

## 2018-07-28 PROCEDURE — 80061 LIPID PANEL: CPT | Performed by: INTERNAL MEDICINE

## 2018-07-28 PROCEDURE — 99232 SBSQ HOSP IP/OBS MODERATE 35: CPT | Performed by: INTERNAL MEDICINE

## 2018-07-28 PROCEDURE — 94640 AIRWAY INHALATION TREATMENT: CPT

## 2018-07-28 PROCEDURE — 83880 ASSAY OF NATRIURETIC PEPTIDE: CPT | Performed by: NURSE PRACTITIONER

## 2018-07-28 PROCEDURE — 94799 UNLISTED PULMONARY SVC/PX: CPT

## 2018-07-28 PROCEDURE — 82962 GLUCOSE BLOOD TEST: CPT

## 2018-07-28 RX ORDER — FUROSEMIDE 10 MG/ML
INJECTION INTRAMUSCULAR; INTRAVENOUS
Status: COMPLETED
Start: 2018-07-28 | End: 2018-07-28

## 2018-07-28 RX ORDER — FUROSEMIDE 10 MG/ML
20 INJECTION INTRAMUSCULAR; INTRAVENOUS ONCE
Status: DISCONTINUED | OUTPATIENT
Start: 2018-07-28 | End: 2018-07-30 | Stop reason: HOSPADM

## 2018-07-28 RX ORDER — ALBUTEROL SULFATE 2.5 MG/3ML
2.5 SOLUTION RESPIRATORY (INHALATION) EVERY 6 HOURS PRN
Status: DISCONTINUED | OUTPATIENT
Start: 2018-07-28 | End: 2018-07-30 | Stop reason: HOSPADM

## 2018-07-28 RX ORDER — FUROSEMIDE 10 MG/ML
20 INJECTION INTRAMUSCULAR; INTRAVENOUS ONCE
Status: COMPLETED | OUTPATIENT
Start: 2018-07-28 | End: 2018-07-28

## 2018-07-28 RX ORDER — ATORVASTATIN CALCIUM 80 MG/1
80 TABLET, FILM COATED ORAL NIGHTLY
Qty: 30 TABLET | Refills: 0 | Status: SHIPPED | OUTPATIENT
Start: 2018-07-28 | End: 2018-08-29 | Stop reason: SDUPTHER

## 2018-07-28 RX ORDER — ISOSORBIDE MONONITRATE 60 MG/1
60 TABLET, EXTENDED RELEASE ORAL
Qty: 30 TABLET | Refills: 0 | Status: SHIPPED | OUTPATIENT
Start: 2018-07-29 | End: 2018-08-29 | Stop reason: SDUPTHER

## 2018-07-28 RX ORDER — METOPROLOL TARTRATE 5 MG/5ML
5 INJECTION INTRAVENOUS ONCE
Status: COMPLETED | OUTPATIENT
Start: 2018-07-28 | End: 2018-07-28

## 2018-07-28 RX ORDER — CARVEDILOL 6.25 MG/1
6.25 TABLET ORAL EVERY 12 HOURS SCHEDULED
Qty: 60 TABLET | Refills: 0 | Status: SHIPPED | OUTPATIENT
Start: 2018-07-28 | End: 2018-08-29 | Stop reason: SDUPTHER

## 2018-07-28 RX ORDER — HYDRALAZINE HYDROCHLORIDE 25 MG/1
25 TABLET, FILM COATED ORAL EVERY 8 HOURS SCHEDULED
Qty: 90 TABLET | Refills: 0 | Status: SHIPPED | OUTPATIENT
Start: 2018-07-28 | End: 2018-07-30

## 2018-07-28 RX ORDER — ISOSORBIDE MONONITRATE 60 MG/1
60 TABLET, EXTENDED RELEASE ORAL
Status: DISCONTINUED | OUTPATIENT
Start: 2018-07-29 | End: 2018-07-30 | Stop reason: HOSPADM

## 2018-07-28 RX ORDER — ATORVASTATIN CALCIUM 40 MG/1
80 TABLET, FILM COATED ORAL NIGHTLY
Status: DISCONTINUED | OUTPATIENT
Start: 2018-07-28 | End: 2018-07-30 | Stop reason: HOSPADM

## 2018-07-28 RX ORDER — CLOPIDOGREL BISULFATE 75 MG/1
75 TABLET ORAL DAILY
Qty: 30 TABLET | Refills: 0 | Status: SHIPPED | OUTPATIENT
Start: 2018-07-29 | End: 2018-08-29 | Stop reason: SDUPTHER

## 2018-07-28 RX ADMIN — FUROSEMIDE 20 MG: 10 INJECTION, SOLUTION INTRAMUSCULAR; INTRAVENOUS at 17:11

## 2018-07-28 RX ADMIN — ASPIRIN 81 MG: 81 TABLET, COATED ORAL at 08:56

## 2018-07-28 RX ADMIN — FUROSEMIDE: 10 INJECTION, SOLUTION INTRAMUSCULAR; INTRAVENOUS at 17:23

## 2018-07-28 RX ADMIN — INSULIN LISPRO 5 UNITS: 100 INJECTION, SOLUTION INTRAVENOUS; SUBCUTANEOUS at 08:59

## 2018-07-28 RX ADMIN — CARVEDILOL 6.25 MG: 6.25 TABLET, FILM COATED ORAL at 08:56

## 2018-07-28 RX ADMIN — METOROPROLOL TARTRATE 5 MG: 5 INJECTION, SOLUTION INTRAVENOUS at 17:30

## 2018-07-28 RX ADMIN — ATORVASTATIN CALCIUM 80 MG: 40 TABLET, FILM COATED ORAL at 20:59

## 2018-07-28 RX ADMIN — NITROGLYCERIN 10 MCG/MIN: 20 INJECTION INTRAVENOUS at 00:47

## 2018-07-28 RX ADMIN — ALBUTEROL SULFATE 2.5 MG: 2.5 SOLUTION RESPIRATORY (INHALATION) at 17:16

## 2018-07-28 RX ADMIN — ISOSORBIDE MONONITRATE 30 MG: 30 TABLET, EXTENDED RELEASE ORAL at 08:56

## 2018-07-28 RX ADMIN — HYDRALAZINE HYDROCHLORIDE 25 MG: 25 TABLET ORAL at 05:43

## 2018-07-28 RX ADMIN — CARVEDILOL 6.25 MG: 6.25 TABLET, FILM COATED ORAL at 20:59

## 2018-07-28 RX ADMIN — AMLODIPINE BESYLATE 10 MG: 10 TABLET ORAL at 08:56

## 2018-07-28 RX ADMIN — INSULIN LISPRO 3 UNITS: 100 INJECTION, SOLUTION INTRAVENOUS; SUBCUTANEOUS at 21:00

## 2018-07-28 RX ADMIN — FUROSEMIDE 20 MG: 10 INJECTION INTRAMUSCULAR; INTRAVENOUS at 17:11

## 2018-07-28 RX ADMIN — CLOPIDOGREL BISULFATE 75 MG: 75 TABLET ORAL at 08:56

## 2018-07-28 RX ADMIN — HYDRALAZINE HYDROCHLORIDE 25 MG: 25 TABLET ORAL at 13:32

## 2018-07-28 RX ADMIN — HYDRALAZINE HYDROCHLORIDE 25 MG: 25 TABLET ORAL at 23:12

## 2018-07-28 RX ADMIN — INSULIN LISPRO 4 UNITS: 100 INJECTION, SOLUTION INTRAVENOUS; SUBCUTANEOUS at 17:30

## 2018-07-29 LAB
ANION GAP SERPL CALCULATED.3IONS-SCNC: 9 MMOL/L (ref 3–11)
BUN BLD-MCNC: 28 MG/DL (ref 9–23)
BUN/CREAT SERPL: 19.9 (ref 7–25)
CALCIUM SPEC-SCNC: 9.1 MG/DL (ref 8.7–10.4)
CHLORIDE SERPL-SCNC: 110 MMOL/L (ref 99–109)
CO2 SERPL-SCNC: 22 MMOL/L (ref 20–31)
CREAT BLD-MCNC: 1.41 MG/DL (ref 0.6–1.3)
GFR SERPL CREATININE-BSD FRML MDRD: 43 ML/MIN/1.73
GLUCOSE BLD-MCNC: 110 MG/DL (ref 70–100)
GLUCOSE BLDC GLUCOMTR-MCNC: 113 MG/DL (ref 70–130)
GLUCOSE BLDC GLUCOMTR-MCNC: 153 MG/DL (ref 70–130)
GLUCOSE BLDC GLUCOMTR-MCNC: 239 MG/DL (ref 70–130)
GLUCOSE BLDC GLUCOMTR-MCNC: 409 MG/DL (ref 70–130)
GLUCOSE BLDC GLUCOMTR-MCNC: 428 MG/DL (ref 70–130)
POTASSIUM BLD-SCNC: 4 MMOL/L (ref 3.5–5.5)
SODIUM BLD-SCNC: 141 MMOL/L (ref 132–146)

## 2018-07-29 PROCEDURE — 99233 SBSQ HOSP IP/OBS HIGH 50: CPT | Performed by: INTERNAL MEDICINE

## 2018-07-29 PROCEDURE — 82962 GLUCOSE BLOOD TEST: CPT

## 2018-07-29 PROCEDURE — 25010000002 HEPARIN (PORCINE) PER 1000 UNITS: Performed by: INTERNAL MEDICINE

## 2018-07-29 PROCEDURE — 80048 BASIC METABOLIC PNL TOTAL CA: CPT | Performed by: INTERNAL MEDICINE

## 2018-07-29 PROCEDURE — 99232 SBSQ HOSP IP/OBS MODERATE 35: CPT | Performed by: INTERNAL MEDICINE

## 2018-07-29 RX ORDER — HEPARIN SODIUM 5000 [USP'U]/ML
5000 INJECTION, SOLUTION INTRAVENOUS; SUBCUTANEOUS EVERY 8 HOURS SCHEDULED
Status: DISCONTINUED | OUTPATIENT
Start: 2018-07-29 | End: 2018-07-30 | Stop reason: HOSPADM

## 2018-07-29 RX ORDER — FUROSEMIDE 20 MG/1
20 TABLET ORAL DAILY
Status: DISCONTINUED | OUTPATIENT
Start: 2018-07-29 | End: 2018-07-30 | Stop reason: HOSPADM

## 2018-07-29 RX ORDER — HYDRALAZINE HYDROCHLORIDE 50 MG/1
50 TABLET, FILM COATED ORAL EVERY 8 HOURS SCHEDULED
Status: DISCONTINUED | OUTPATIENT
Start: 2018-07-29 | End: 2018-07-30 | Stop reason: HOSPADM

## 2018-07-29 RX ADMIN — HEPARIN SODIUM 5000 UNITS: 5000 INJECTION, SOLUTION INTRAVENOUS; SUBCUTANEOUS at 13:54

## 2018-07-29 RX ADMIN — HYDRALAZINE HYDROCHLORIDE 50 MG: 50 TABLET, FILM COATED ORAL at 21:47

## 2018-07-29 RX ADMIN — AMLODIPINE BESYLATE 10 MG: 10 TABLET ORAL at 09:32

## 2018-07-29 RX ADMIN — INSULIN LISPRO 2 UNITS: 100 INJECTION, SOLUTION INTRAVENOUS; SUBCUTANEOUS at 16:56

## 2018-07-29 RX ADMIN — CARVEDILOL 6.25 MG: 6.25 TABLET, FILM COATED ORAL at 20:51

## 2018-07-29 RX ADMIN — CLOPIDOGREL BISULFATE 75 MG: 75 TABLET ORAL at 09:32

## 2018-07-29 RX ADMIN — INSULIN LISPRO 3 UNITS: 100 INJECTION, SOLUTION INTRAVENOUS; SUBCUTANEOUS at 20:51

## 2018-07-29 RX ADMIN — CARVEDILOL 6.25 MG: 6.25 TABLET, FILM COATED ORAL at 09:32

## 2018-07-29 RX ADMIN — HEPARIN SODIUM 5000 UNITS: 5000 INJECTION, SOLUTION INTRAVENOUS; SUBCUTANEOUS at 21:46

## 2018-07-29 RX ADMIN — HYDRALAZINE HYDROCHLORIDE 50 MG: 50 TABLET, FILM COATED ORAL at 14:03

## 2018-07-29 RX ADMIN — ASPIRIN 81 MG: 81 TABLET, COATED ORAL at 09:32

## 2018-07-29 RX ADMIN — FUROSEMIDE 20 MG: 20 TABLET ORAL at 11:48

## 2018-07-29 RX ADMIN — ATORVASTATIN CALCIUM 80 MG: 40 TABLET, FILM COATED ORAL at 20:50

## 2018-07-29 RX ADMIN — ISOSORBIDE MONONITRATE 60 MG: 60 TABLET, EXTENDED RELEASE ORAL at 09:32

## 2018-07-29 RX ADMIN — INSULIN LISPRO 7 UNITS: 100 INJECTION, SOLUTION INTRAVENOUS; SUBCUTANEOUS at 11:49

## 2018-07-29 RX ADMIN — HYDRALAZINE HYDROCHLORIDE 25 MG: 25 TABLET ORAL at 05:43

## 2018-07-30 VITALS
HEIGHT: 62 IN | OXYGEN SATURATION: 94 % | BODY MASS INDEX: 23.66 KG/M2 | RESPIRATION RATE: 16 BRPM | HEART RATE: 60 BPM | TEMPERATURE: 98.5 F | DIASTOLIC BLOOD PRESSURE: 71 MMHG | SYSTOLIC BLOOD PRESSURE: 164 MMHG | WEIGHT: 128.6 LBS

## 2018-07-30 PROBLEM — N17.9 AKI (ACUTE KIDNEY INJURY): Status: RESOLVED | Noted: 2018-07-25 | Resolved: 2018-07-30

## 2018-07-30 LAB
ANION GAP SERPL CALCULATED.3IONS-SCNC: 6 MMOL/L (ref 3–11)
BUN BLD-MCNC: 23 MG/DL (ref 9–23)
BUN/CREAT SERPL: 19 (ref 7–25)
CALCIUM SPEC-SCNC: 8.7 MG/DL (ref 8.7–10.4)
CHLORIDE SERPL-SCNC: 110 MMOL/L (ref 99–109)
CO2 SERPL-SCNC: 24 MMOL/L (ref 20–31)
CREAT BLD-MCNC: 1.21 MG/DL (ref 0.6–1.3)
GFR SERPL CREATININE-BSD FRML MDRD: 51 ML/MIN/1.73
GLUCOSE BLD-MCNC: 119 MG/DL (ref 70–100)
GLUCOSE BLDC GLUCOMTR-MCNC: 139 MG/DL (ref 70–130)
GLUCOSE BLDC GLUCOMTR-MCNC: 247 MG/DL (ref 70–130)
POTASSIUM BLD-SCNC: 3.7 MMOL/L (ref 3.5–5.5)
SODIUM BLD-SCNC: 140 MMOL/L (ref 132–146)

## 2018-07-30 PROCEDURE — 99239 HOSP IP/OBS DSCHRG MGMT >30: CPT | Performed by: NURSE PRACTITIONER

## 2018-07-30 PROCEDURE — 80048 BASIC METABOLIC PNL TOTAL CA: CPT | Performed by: INTERNAL MEDICINE

## 2018-07-30 PROCEDURE — 25010000002 HEPARIN (PORCINE) PER 1000 UNITS: Performed by: INTERNAL MEDICINE

## 2018-07-30 PROCEDURE — 82962 GLUCOSE BLOOD TEST: CPT

## 2018-07-30 PROCEDURE — 99232 SBSQ HOSP IP/OBS MODERATE 35: CPT | Performed by: INTERNAL MEDICINE

## 2018-07-30 RX ORDER — HYDRALAZINE HYDROCHLORIDE 50 MG/1
50 TABLET, FILM COATED ORAL EVERY 8 HOURS SCHEDULED
Qty: 90 TABLET | Refills: 5 | Status: SHIPPED | OUTPATIENT
Start: 2018-07-30 | End: 2018-11-26 | Stop reason: SDUPTHER

## 2018-07-30 RX ORDER — GLIPIZIDE 5 MG/1
2.5 TABLET ORAL
Qty: 30 TABLET | Refills: 0 | Status: SHIPPED | OUTPATIENT
Start: 2018-07-30 | End: 2020-02-14 | Stop reason: HOSPADM

## 2018-07-30 RX ORDER — GLIPIZIDE 5 MG/1
2.5 TABLET ORAL
Status: DISCONTINUED | OUTPATIENT
Start: 2018-07-30 | End: 2018-07-30 | Stop reason: HOSPADM

## 2018-07-30 RX ORDER — FUROSEMIDE 20 MG/1
20 TABLET ORAL DAILY
Qty: 30 TABLET | Refills: 11 | Status: SHIPPED | OUTPATIENT
Start: 2018-07-30 | End: 2018-11-05 | Stop reason: SDUPTHER

## 2018-07-30 RX ADMIN — ISOSORBIDE MONONITRATE 60 MG: 60 TABLET, EXTENDED RELEASE ORAL at 09:34

## 2018-07-30 RX ADMIN — FUROSEMIDE 20 MG: 20 TABLET ORAL at 09:35

## 2018-07-30 RX ADMIN — ASPIRIN 81 MG: 81 TABLET, COATED ORAL at 09:35

## 2018-07-30 RX ADMIN — AMLODIPINE BESYLATE 10 MG: 10 TABLET ORAL at 09:34

## 2018-07-30 RX ADMIN — CARVEDILOL 6.25 MG: 6.25 TABLET, FILM COATED ORAL at 09:34

## 2018-07-30 RX ADMIN — HEPARIN SODIUM 5000 UNITS: 5000 INJECTION, SOLUTION INTRAVENOUS; SUBCUTANEOUS at 05:59

## 2018-07-30 RX ADMIN — HYDRALAZINE HYDROCHLORIDE 50 MG: 50 TABLET, FILM COATED ORAL at 05:59

## 2018-07-30 RX ADMIN — CLOPIDOGREL BISULFATE 75 MG: 75 TABLET ORAL at 09:35

## 2018-08-03 ENCOUNTER — LAB (OUTPATIENT)
Dept: LAB | Facility: HOSPITAL | Age: 83
End: 2018-08-03

## 2018-08-03 ENCOUNTER — OFFICE VISIT (OUTPATIENT)
Dept: CARDIOLOGY | Facility: HOSPITAL | Age: 83
End: 2018-08-03

## 2018-08-03 VITALS
HEIGHT: 62 IN | HEART RATE: 73 BPM | RESPIRATION RATE: 16 BRPM | DIASTOLIC BLOOD PRESSURE: 64 MMHG | OXYGEN SATURATION: 99 % | TEMPERATURE: 97.8 F | WEIGHT: 125 LBS | BODY MASS INDEX: 23 KG/M2 | SYSTOLIC BLOOD PRESSURE: 132 MMHG

## 2018-08-03 DIAGNOSIS — I10 ESSENTIAL HYPERTENSION: ICD-10-CM

## 2018-08-03 DIAGNOSIS — E78.2 MIXED HYPERLIPIDEMIA: ICD-10-CM

## 2018-08-03 DIAGNOSIS — I50.22 CHRONIC SYSTOLIC HEART FAILURE (HCC): ICD-10-CM

## 2018-08-03 DIAGNOSIS — I25.10 CORONARY ARTERY DISEASE INVOLVING NATIVE CORONARY ARTERY OF NATIVE HEART WITHOUT ANGINA PECTORIS: ICD-10-CM

## 2018-08-03 DIAGNOSIS — I50.22 CHRONIC SYSTOLIC HEART FAILURE (HCC): Primary | ICD-10-CM

## 2018-08-03 PROBLEM — I25.5 ISCHEMIC CARDIOMYOPATHY: Status: ACTIVE | Noted: 2018-08-03

## 2018-08-03 LAB
ANION GAP SERPL CALCULATED.3IONS-SCNC: 7 MMOL/L (ref 3–11)
BUN BLD-MCNC: 19 MG/DL (ref 9–23)
BUN/CREAT SERPL: 14.6 (ref 7–25)
CALCIUM SPEC-SCNC: 9.1 MG/DL (ref 8.7–10.4)
CHLORIDE SERPL-SCNC: 109 MMOL/L (ref 99–109)
CO2 SERPL-SCNC: 23 MMOL/L (ref 20–31)
CREAT BLD-MCNC: 1.3 MG/DL (ref 0.6–1.3)
GFR SERPL CREATININE-BSD FRML MDRD: 47 ML/MIN/1.73
GLUCOSE BLD-MCNC: 158 MG/DL (ref 70–100)
POTASSIUM BLD-SCNC: 4.6 MMOL/L (ref 3.5–5.5)
SODIUM BLD-SCNC: 139 MMOL/L (ref 132–146)

## 2018-08-03 PROCEDURE — 36415 COLL VENOUS BLD VENIPUNCTURE: CPT

## 2018-08-03 PROCEDURE — 80048 BASIC METABOLIC PNL TOTAL CA: CPT

## 2018-08-03 PROCEDURE — 99214 OFFICE O/P EST MOD 30 MIN: CPT | Performed by: NURSE PRACTITIONER

## 2018-08-03 RX ORDER — TRAVOPROST OPHTHALMIC SOLUTION 0.04 MG/ML
1 SOLUTION OPHTHALMIC NIGHTLY
COMMUNITY
Start: 2016-11-03

## 2018-08-03 RX ORDER — TIMOLOL MALEATE 5 MG/ML
1 SOLUTION OPHTHALMIC 2 TIMES DAILY
COMMUNITY
End: 2022-01-01

## 2018-08-03 RX ORDER — INSULIN ASPART 100 [IU]/ML
3 INJECTION, SOLUTION INTRAVENOUS; SUBCUTANEOUS AS NEEDED
Refills: 1 | COMMUNITY
Start: 2018-05-20 | End: 2020-02-14 | Stop reason: HOSPADM

## 2018-08-03 RX ORDER — CHLORAL HYDRATE 500 MG
1 CAPSULE ORAL DAILY
COMMUNITY
End: 2018-09-10

## 2018-08-03 NOTE — PROGRESS NOTES
Encounter Date:08/03/2018      Patient ID: Varinder Delgado is a 87 y.o. female.        Subjective:     Chief Complaint: Establish Care (s/p Hospitalization) and Congestive Heart Failure     History of Present Illness patient presents to the office today for ongoing evaluation for her ICM and CAD. Patient had a NSTEMI  And was taken to the cath lab per Dr De La Rosa where she received a TOSHA to mid LAD. She notes that she is feeling significantly better since discharge from the hospital. She notes weight loss of a few pounds. She notes bps at home have been 140s-150s/70s. She notes that her breathing has improved and she denies any chest pain, pedal edema.     Patient Active Problem List   Diagnosis   • Hypertension   • Diabetes mellitus (CMS/HCC)   • Hyperlipemia   • Tobacco abuse   • NSTEMI (non-ST elevated myocardial infarction) (CMS/HCC)   • Non-ST elevated myocardial infarction (non-STEMI) (CMS/HCC)   • Ischemic cardiomyopathy       Past Surgical History:   Procedure Laterality Date   • CARDIAC CATHETERIZATION N/A 7/27/2018    Procedure: Left Heart Cath;  Surgeon: Sreekanth De La Rosa MD;  Location: [facility] CATH INVASIVE LOCATION;  Service: Cardiology   • MOUTH SURGERY         Allergies   Allergen Reactions   • Enalapril Angioedema   • Contrast Dye Swelling         Current Outpatient Prescriptions:   •  amLODIPine (NORVASC) 10 MG tablet, Take 10 mg by mouth Daily., Disp: , Rfl:   •  aspirin 81 MG chewable tablet, Chew 81 mg Daily., Disp: , Rfl:   •  atorvastatin (LIPITOR) 80 MG tablet, Take 1 tablet by mouth Every Night., Disp: 30 tablet, Rfl: 0  •  carvedilol (COREG) 6.25 MG tablet, Take 1 tablet by mouth Every 12 (Twelve) Hours., Disp: 60 tablet, Rfl: 0  •  clopidogrel (PLAVIX) 75 MG tablet, Take 1 tablet by mouth Daily., Disp: 30 tablet, Rfl: 0  •  furosemide (LASIX) 20 MG tablet, Take 1 tablet by mouth Daily., Disp: 30 tablet, Rfl: 11  •  glipiZIDE (GLUCOTROL) 5 MG tablet, Take 0.5 tablets by mouth Every Morning Before  Breakfast., Disp: 30 tablet, Rfl: 0  •  hydrALAZINE (APRESOLINE) 50 MG tablet, Take 1 tablet by mouth Every 8 (Eight) Hours., Disp: 90 tablet, Rfl: 5  •  isosorbide mononitrate (IMDUR) 60 MG 24 hr tablet, Take 1 tablet by mouth Daily., Disp: 30 tablet, Rfl: 0  •  metFORMIN (GLUCOPHAGE) 1000 MG tablet, Take 1 tablet by mouth 2 (Two) Times a Day With Meals. Hold x 3 days- follow up with pcp, Disp: , Rfl:   •  Omega-3 1000 MG capsule, Take 1 capsule by mouth Daily., Disp: , Rfl:   •  timolol (TIMOPTIC-XR) 0.5 % ophthalmic gel-forming, Administer 1 drop to both eyes 2 (Two) Times a Day., Disp: , Rfl:   •  travoprost, CAROLINE free, (TRAVATAN Z) 0.004 % solution ophthalmic solution, Administer 1 drop to the right eye Every Night., Disp: , Rfl:   •  NOVOLOG FLEXPEN 100 UNIT/ML solution pen-injector sc pen, Inject 3 Units under the skin into the appropriate area as directed 3 (Three) Times a Day With Meals., Disp: , Rfl: 1    The following portions of the chart were reviewed and updated as appropriate: Allergies, current medications, past family history, social history, past medical history.     Review of Systems   Constitution: Negative for chills, decreased appetite, diaphoresis, fever, weakness, malaise/fatigue, night sweats, weight gain and weight loss.   HENT: Negative for congestion, hearing loss, hoarse voice and nosebleeds.    Eyes: Negative for blurred vision, visual disturbance and visual halos.   Cardiovascular: Negative for chest pain, claudication, cyanosis, dyspnea on exertion, irregular heartbeat, leg swelling, near-syncope, orthopnea, palpitations, paroxysmal nocturnal dyspnea and syncope.   Respiratory: Negative for cough, hemoptysis, shortness of breath, sleep disturbances due to breathing, snoring, sputum production and wheezing.    Hematologic/Lymphatic: Negative for bleeding problem. Does not bruise/bleed easily.   Skin: Negative for dry skin, itching and rash.   Musculoskeletal: Negative for arthritis,  "joint pain, joint swelling and myalgias.   Gastrointestinal: Negative for bloating, abdominal pain, constipation, diarrhea, flatus, heartburn, hematemesis, hematochezia, melena, nausea and vomiting.   Genitourinary: Negative for dysuria, frequency, hematuria, nocturia and urgency.   Neurological: Negative for excessive daytime sleepiness, dizziness, headaches, light-headedness and loss of balance.   Psychiatric/Behavioral: Negative for depression. The patient does not have insomnia and is not nervous/anxious.            Objective:     Vitals:    08/03/18 0947 08/03/18 0949 08/03/18 0950   BP: 140/64 137/58 132/64   BP Location: Right arm Left arm Left arm   Patient Position: Sitting Sitting Standing   Cuff Size: Adult     Pulse: 64 63 73   Resp: 16     Temp: 97.8 °F (36.6 °C)     TempSrc: Temporal Artery      SpO2: 99%     Weight: 56.7 kg (125 lb)     Height: 157.5 cm (62\")           Physical Exam   Constitutional: She is oriented to person, place, and time. She appears well-developed and well-nourished. She is active and cooperative. No distress.   HENT:   Head: Normocephalic and atraumatic.   Mouth/Throat: Oropharynx is clear and moist.   Eyes: Pupils are equal, round, and reactive to light. Conjunctivae and EOM are normal.   Neck: Normal range of motion. Neck supple. No JVD present. No tracheal deviation present. No thyromegaly present.   Cardiovascular: Normal rate, regular rhythm, normal heart sounds and intact distal pulses.    Pulmonary/Chest: Effort normal and breath sounds normal.   Abdominal: Soft. Bowel sounds are normal. She exhibits no distension. There is no tenderness.   Musculoskeletal: Normal range of motion.   Neurological: She is alert and oriented to person, place, and time.   Skin: Skin is warm, dry and intact.   Psychiatric: She has a normal mood and affect. Her behavior is normal.   Nursing note and vitals reviewed.      Lab and Diagnostic Review:      Lab Results   Component Value Date    " GLUCOSE 158 (H) 08/03/2018    CALCIUM 9.1 08/03/2018     08/03/2018    K 4.6 08/03/2018    CO2 23.0 08/03/2018     08/03/2018    BUN 19 08/03/2018    CREATININE 1.30 08/03/2018    EGFRIFAFRI 47 (L) 08/03/2018    BCR 14.6 08/03/2018    ANIONGAP 7.0 08/03/2018       Lab Results   Component Value Date    GLUCOSE 158 (H) 08/03/2018    CALCIUM 9.1 08/03/2018     08/03/2018    K 4.6 08/03/2018    CO2 23.0 08/03/2018     08/03/2018    BUN 19 08/03/2018    CREATININE 1.30 08/03/2018    EGFRIFAFRI 47 (L) 08/03/2018    BCR 14.6 08/03/2018    ANIONGAP 7.0 08/03/2018         Assessment and Plan:         1. Chronic systolic heart failure (CMS/HCC)  euvolemic  Heart failure education today including signs and symptoms, the role of the heart failure center, daily weights, low sodium diet (less than 1500 mg per day), and daily physical activity. Reviewed HF Zones with patient and family.  Patient to continue current medications as previously ordered.   - Basic Metabolic Panel; Future  - Ambulatory Referral to Cardiac Rehab    2. Coronary artery disease involving native coronary artery of native heart without angina pectoris  Without angina  Continue asa, lipitor, coreg  - Ambulatory Referral to Cardiac Rehab    3. Essential hypertension  Under decent control  HTN Education provided today including signs and symptoms, medication management, daily blood pressure monitoring. Patient encouraged to call the [facility] with any abnormal readings.   - Basic Metabolic Panel; Future    4. Mixed hyperlipidemia  Currently on statin therapy    It has been a pleasure to participate in the care of this patient.  Patient was instructed to call the [facility] with any questions, concerns, or worsening symptoms.        * Please note that portions of this note were completed with a voice recognition program. Efforts were made to edit the dictation but occasionally words are transcribed.

## 2018-08-07 ENCOUNTER — TRANSCRIBE ORDERS (OUTPATIENT)
Dept: CARDIAC REHAB | Facility: HOSPITAL | Age: 83
End: 2018-08-07

## 2018-08-07 ENCOUNTER — DOCUMENTATION (OUTPATIENT)
Dept: CARDIAC REHAB | Facility: HOSPITAL | Age: 83
End: 2018-08-07

## 2018-08-07 DIAGNOSIS — I21.4 NSTEMI (NON-ST ELEVATED MYOCARDIAL INFARCTION) (HCC): Primary | ICD-10-CM

## 2018-08-07 NOTE — PROGRESS NOTES
Pt. Referred for Phase II Cardiac Rehab. Staff discussed benefits of exercise, program protocol, and educational material provided. Teach back verified.  Patient scheduled for orientation at [facility] on Wednesday, August 22nd at 1300. Teach back verified.

## 2018-08-28 ENCOUNTER — TREATMENT (OUTPATIENT)
Dept: CARDIAC REHAB | Facility: HOSPITAL | Age: 83
End: 2018-08-28
Attending: INTERNAL MEDICINE

## 2018-08-28 VITALS
OXYGEN SATURATION: 98 % | BODY MASS INDEX: 23 KG/M2 | HEIGHT: 62 IN | WEIGHT: 125 LBS | DIASTOLIC BLOOD PRESSURE: 64 MMHG | RESPIRATION RATE: 18 BRPM | SYSTOLIC BLOOD PRESSURE: 140 MMHG

## 2018-08-28 DIAGNOSIS — I21.4 NSTEMI (NON-ST ELEVATED MYOCARDIAL INFARCTION) (HCC): ICD-10-CM

## 2018-08-28 PROCEDURE — 93798 PHYS/QHP OP CAR RHAB W/ECG: CPT

## 2018-08-28 NOTE — PROGRESS NOTES
Cardiac Rehab Initial Assessment      Name: Varinder Delgado  :1931 Allergies:Enalapril and Contrast dye   MRN: [id number] 87 y.o. Physician: Santiago Kuhn MD   Primary Diagnosis:    Diagnosis Plan   1. NSTEMI (non-ST elevated myocardial infarction) (CMS/HCC)  Cardiac Rehab Phase II    Event Date: 18 Specialist: Dr. Sreekanth De La Rosa   Secondary Diagnosis: Stented coronary artery Risk Stratification:High Risk Note Author: Christine Bonilla     Cardiovascular History: Comments n/a     EXERCISE AT HOME  no  N/A  N/A    EF: 31-35%      Source: CATH REPORT          Ambulatory Status:Independent  Ambulatory Fall Risk Assessed on Initial Visit: yes 6 Minute Walk Pre- Cardiac Rehab:  Distance:877ft      RPE:12  Max. HR: 86       SPO2:98    MET: n/a  MPH: n/a             RPD: n/a  Resting BP: 132/68 LA, 140/64 RA    Peak BP: 160/62  Recovery BP: 130/62  Comments: n/a      NUTRITION  Lipids:yes If yes, labs as follows;  Total: No components found for: CHOLESTEROL  HDL:   HDL Cholesterol   Date Value Ref Range Status   2018 48 40 - 60 mg/dL Final    Lipids continued:  LDL:  LDL Cholesterol    Date Value Ref Range Status   2018 166 (H) 0 - 130 mg/dL Final     Triglyceride: No components found for: TRIGLYCERIDE   Weight Management:                 Weight: 125 lb  Height: 62 IN                                   BMI: There is no height or weight on file to calculate BMI.  Waist Circumference: N/A  inches   Alcohol Use: none Diabetes:Yes,  Monitors BS at home- yes, Frequency: 1 time daily, Random BS: 122    Last HGBA1C with date if applicable:No components found for: A1C         SOCIAL HISTORY  Social History     Social History   • Marital status:      Occupational History   • Retired      Social History Main Topics   • Smoking status: Former Smoker     Packs/day: 0.50     Years: 55.00     Types: Cigarettes     Start date: 2018     Quit date: 2018   • Smokeless tobacco: Never Used   • Alcohol  use No   • Drug use: No     Other Topics Concern   • Not on file     Social History Narrative    Caffeine intake:1-2  servings per day    Patientt  Lives alone at her home       Educational Level (choose one that applies) high school diploma/GED Learning Barriers:[employer], [employer]    Family Support:yes    Living Arrangement: lives alone    Risk Factors: Hyperlipidemia  Yes and Diabetes  Yes If Yes: Do you check blood glucose daily  Yes Today's glucose level 122     Tobacco Adjunct: No  FORMER SMOKER  QUIT July 2018      Comorbidities: Diabetes Mellitus     PSYCHOSOCIAL  Clinical Depression: no    Stress: no     Assess presence or absence of depression using a valid screening tool: yes      PHYSICAL ASSESSMENT  Influenza vaccine: yes  Pneumococcal vaccine: yes          Angina: no    Describe angina scale of 0 - 4: 0 = none    Today are you having incisional pain? N/A. If, Yes, Scale: N/A    N/A    Today are you having any other pain? No. If, Yes, Scale: N/A    N/A Diagnosed with Hypertension:yes    Heart Sounds: s1, s2     Lung Sounds: normal air entry, lungs clear to auscultation         Assessment: WNL Orthopedic Problems: NONE    Are you being hurt, hit, or frightened by anyone at home or in your life? no    Are you being neglected by a caregiver? No Shoulder flexibility/Range of motion: Average     Recommended arm activity: Any    Chair sit and reach within: 0 inches   Leg flexibility: Average    Leg Strength/Balance/Five times sit to stand: 0 seconds.   N/A  Chose one: Average    Recommended stretching: Chair    Assessment: WNL    Family attends [place]: yes Time of arrival: 1020  Time of departure: 1140     Patient Goals: Exercise regularly and develop a plan for home exercise by the end of the program.          8/28/2018  10:13 AM  Christine Bonilla

## 2018-08-28 NOTE — PATIENT INSTRUCTIONS
Stress and Stress Management  Stress is a normal reaction to life events. It is what you feel when life demands more than you are used to or more than you can handle. Some stress can be useful. For example, the stress reaction can help you catch the last bus of the day, study for a test, or meet a deadline at work. But stress that occurs too often or for too long can cause problems. It can affect your emotional health and interfere with relationships and normal daily activities. Too much stress can weaken your immune system and increase your risk for physical illness. If you already have a medical problem, stress can make it worse.  What are the causes?  All sorts of life events may cause stress. An event that causes stress for one person may not be stressful for another person. Major life events commonly cause stress. These may be positive or negative. Examples include losing your job, moving into a new home, getting , having a baby, or losing a loved one. Less obvious life events may also cause stress, especially if they occur day after day or in combination. Examples include working long hours, driving in traffic, caring for children, being in debt, or being in a difficult relationship.  What are the signs or symptoms?  Stress may cause emotional symptoms including, the following:  · Anxiety. This is feeling worried, afraid, on edge, overwhelmed, or out of control.  · Anger. This is feeling irritated or impatient.  · Depression. This is feeling sad, down, helpless, or guilty.  · Difficulty focusing, remembering, or making decisions.    Stress may cause physical symptoms, including the following:  · Aches and pains. These may affect your head, neck, back, stomach, or other areas of your body.  · Tight muscles or clenched jaw.  · Low energy or trouble sleeping.    Stress may cause unhealthy behaviors, including the following:  · Eating to feel better (overeating) or skipping meals.  · Sleeping too little,  too much, or both.  · Working too much or putting off tasks (procrastination).  · Smoking, drinking alcohol, or using drugs to feel better.    How is this diagnosed?  Stress is diagnosed through an assessment by your health care provider. Your health care provider will ask questions about your symptoms and any stressful life events. Your health care provider will also ask about your medical history and may order blood tests or other tests. Certain medical conditions and medicine can cause physical symptoms similar to stress. Mental illness can cause emotional symptoms and unhealthy behaviors similar to stress. Your health care provider may refer you to a mental health professional for further evaluation.  How is this treated?  Stress management is the recommended treatment for stress. The goals of stress management are reducing stressful life events and coping with stress in healthy ways.  Techniques for reducing stressful life events include the following:  · Stress identification. Self-monitor for stress and identify what causes stress for you. These skills may help you to avoid some stressful events.  · Time management. Set your priorities, keep a calendar of events, and learn to say “no.” These tools can help you avoid making too many commitments.    Techniques for coping with stress include the following:  · Rethinking the problem. Try to think realistically about stressful events rather than ignoring them or overreacting. Try to find the positives in a stressful situation rather than focusing on the negatives.  · Exercise. Physical exercise can release both physical and emotional tension. The key is to find a form of exercise you enjoy and do it regularly.  · Relaxation techniques. These relax the body and mind. Examples include yoga, meditation, astrid chi, biofeedback, deep breathing, progressive muscle relaxation, listening to music, being out in nature, journaling, and other hobbies. Again, the key is to find  one or more that you enjoy and can do regularly.  · Healthy lifestyle. Eat a balanced diet, get plenty of sleep, and do not smoke. Avoid using alcohol or drugs to relax.  · Strong support network. Spend time with family, friends, or other people you enjoy being around. Express your feelings and talk things over with someone you trust.    Counseling or talktherapy with a mental health professional may be helpful if you are having difficulty managing stress on your own. Medicine is typically not recommended for the treatment of stress. Talk to your health care provider if you think you need medicine for symptoms of stress.  Follow these instructions at home:  · Keep all follow-up visits as directed by your health care provider.  · Take all medicines as directed by your health care provider.  Contact a health care provider if:  · Your symptoms get worse or you start having new symptoms.  · You feel overwhelmed by your problems and can no longer manage them on your own.  Get help right away if:  · You feel like hurting yourself or someone else.  This information is not intended to replace advice given to you by your health care provider. Make sure you discuss any questions you have with your health care provider.  Document Released: 06/13/2002 Document Revised: 05/25/2017 Document Reviewed: 08/12/2014  [employer] Interactive Patient Education © 2017 [employer]  Fall Prevention in the Home  Falls can cause injuries and can affect people from all age groups. There are many simple things that you can do to make your home safe and to help prevent falls.  What can I do on the outside of my home?  · Regularly repair the edges of walkways and driveways and fix any cracks.  · Remove high doorway thresholds.  · Trim any shrubbery on the main path into your home.  · Use bright outdoor lighting.  · Clear walkways of debris and clutter, including tools and rocks.  · Regularly check that handrails are securely fastened and in good  repair. Both sides of any steps should have handrails.  · Install guardrails along the edges of any raised decks or porches.  · Have leaves, snow, and ice cleared regularly.  · Use sand or salt on walkways during winter months.  · In the garage, clean up any spills right away, including grease or oil spills.  What can I do in the bathroom?  · Use night lights.  · Install grab bars by the toilet and in the tub and shower. Do not use towel bars as grab bars.  · Use non-skid mats or decals on the floor of the tub or shower.  · If you need to sit down while you are in the shower, use a plastic, non-slip stool.  · Keep the floor dry. Immediately clean up any water that spills on the floor.  · Remove soap buildup in the tub or shower on a regular basis.  · Attach bath mats securely with double-sided non-slip rug tape.  · Remove throw rugs and other tripping hazards from the floor.  What can I do in the bedroom?  · Use night lights.  · Make sure that a bedside light is easy to reach.  · Do not use oversized bedding that drapes onto the floor.  · Have a firm chair that has side arms to use for getting dressed.  · Remove throw rugs and other tripping hazards from the floor.  What can I do in the kitchen?  · Clean up any spills right away.  · Avoid walking on wet floors.  · Place frequently used items in easy-to-reach places.  · If you need to reach for something above you, use a sturdy step stool that has a grab bar.  · Keep electrical cables out of the way.  · Do not use floor polish or wax that makes floors slippery. If you have to use wax, make sure that it is non-skid floor wax.  · Remove throw rugs and other tripping hazards from the floor.  What can I do in the stairways?  · Do not leave any items on the stairs.  · Make sure that there are handrails on both sides of the stairs. Fix handrails that are broken or loose. Make sure that handrails are as long as the stairways.  · Check any carpeting to make sure that it is  firmly attached to the stairs. Fix any carpet that is loose or worn.  · Avoid having throw rugs at the top or bottom of stairways, or secure the rugs with carpet tape to prevent them from moving.  · Make sure that you have a light switch at the top of the stairs and the bottom of the stairs. If you do not have them, have them installed.  What are some other fall prevention tips?  · Wear closed-toe shoes that fit well and support your feet. Wear shoes that have rubber soles or low heels.  · When you use a stepladder, make sure that it is completely opened and that the sides are firmly locked. Have someone hold the ladder while you are using it. Do not climb a closed stepladder.  · Add color or contrast paint or tape to grab bars and handrails in your home. Place contrasting color strips on the first and last steps.  · Use mobility aids as needed, such as canes, walkers, scooters, and crutches.  · Turn on lights if it is dark. Replace any light bulbs that burn out.  · Set up furniture so that there are clear paths. Keep the furniture in the same spot.  · Fix any uneven floor surfaces.  · Choose a carpet design that does not hide the edge of steps of a stairway.  · Be aware of any and all pets.  · Review your medicines with your healthcare provider. Some medicines can cause dizziness or changes in blood pressure, which increase your risk of falling.  Talk with your health care provider about other ways that you can decrease your risk of falls. This may include working with a physical therapist or  to improve your strength, balance, and endurance.  This information is not intended to replace advice given to you by your health care provider. Make sure you discuss any questions you have with your health care provider.  Document Released: 12/08/2003 Document Revised: 05/16/2017 Document Reviewed: 01/22/2016  [employer] Interactive Patient Education © 2018 [employer]    Steps to Quit Smoking  Smoking tobacco can be  harmful to your health and can affect almost every organ in your body. Smoking puts you, and those around you, at risk for developing many serious chronic diseases. Quitting smoking is difficult, but it is one of the best things that you can do for your health. It is never too late to quit.  What are the benefits of quitting smoking?  When you quit smoking, you lower your risk of developing serious diseases and conditions, such as:  · Lung cancer or lung disease, such as COPD.  · Heart disease.  · Stroke.  · Heart attack.  · Infertility.  · Osteoporosis and bone fractures.    Additionally, symptoms such as coughing, wheezing, and shortness of breath may get better when you quit. You may also find that you get sick less often because your body is stronger at fighting off colds and infections. If you are pregnant, quitting smoking can help to reduce your chances of having a baby of low birth weight.  How do I get ready to quit?  When you decide to quit smoking, create a plan to make sure that you are successful. Before you quit:  · Pick a date to quit. Set a date within the next two weeks to give you time to prepare.  · Write down the reasons why you are quitting. Keep this list in places where you will see it often, such as on your bathroom mirror or in your car or wallet.  · Identify the people, places, things, and activities that make you want to smoke (triggers) and avoid them. Make sure to take these actions:  ? Throw away all cigarettes at home, at work, and in your car.  ? Throw away smoking accessories, such as ashtrays and lighters.  ? Clean your car and make sure to empty the ashtray.  ? Clean your home, including curtains and carpets.  · Tell your family, friends, and coworkers that you are quitting. Support from your loved ones can make quitting easier.  · Talk with your health care provider about your options for quitting smoking.  · Find out what treatment options are covered by your health  insurance.    What strategies can I use to quit smoking?  Talk with your healthcare provider about different strategies to quit smoking. Some strategies include:  · Quitting smoking altogether instead of gradually lessening how much you smoke over a period of time. Research shows that quitting “cold turkey” is more successful than gradually quitting.  · Attending in-person counseling to help you build problem-solving skills. You are more likely to have success in quitting if you attend several counseling sessions. Even short sessions of 10 minutes can be effective.  · Finding resources and support systems that can help you to quit smoking and remain smoke-free after you quit. These resources are most helpful when you use them often. They can include:  ? Online chats with a counselor.  ? Telephone quitlines.  ? Printed self-help materials.  ? Support groups or group counseling.  ? Text messaging programs.  ? Mobile phone applications.  · Taking medicines to help you quit smoking. (If you are pregnant or breastfeeding, talk with your health care provider first.) Some medicines contain nicotine and some do not. Both types of medicines help with cravings, but the medicines that include nicotine help to relieve withdrawal symptoms. Your health care provider may recommend:  ? Nicotine patches, gum, or lozenges.  ? Nicotine inhalers or sprays.  ? Non-nicotine medicine that is taken by mouth.    Talk with your health care provider about combining strategies, such as taking medicines while you are also receiving in-person counseling. Using these two strategies together makes you more likely to succeed in quitting than if you used either strategy on its own.  If you are pregnant or breastfeeding, talk with your health care provider about finding counseling or other support strategies to quit smoking. Do not take medicine to help you quit smoking unless told to do so by your health care provider.  What things can I do to make  it easier to quit?  Quitting smoking might feel overwhelming at first, but there is a lot that you can do to make it easier. Take these important actions:  · Reach out to your family and friends and ask that they support and encourage you during this time. Call telephone quitlines, reach out to support groups, or work with a counselor for support.  · Ask people who smoke to avoid smoking around you.  · Avoid places that trigger you to smoke, such as bars, parties, or smoke-break areas at work.  · Spend time around people who do not smoke.  · Lessen stress in your life, because stress can be a smoking trigger for some people. To lessen stress, try:  ? Exercising regularly.  ? Deep-breathing exercises.  ? Yoga.  ? Meditating.  ? Performing a body scan. This involves closing your eyes, scanning your body from head to toe, and noticing which parts of your body are particularly tense. Purposefully relax the muscles in those areas.  · Download or purchase mobile phone or tablet apps (applications) that can help you stick to your quit plan by providing reminders, tips, and encouragement. There are many free apps, such as QuitGuide from the CDC (Centers for Disease Control and Prevention). You can find other support for quitting smoking (smoking cessation) through smokefree.gov and other websites.    How will I feel when I quit smoking?  Within the first 24 hours of quitting smoking, you may start to feel some withdrawal symptoms. These symptoms are usually most noticeable 2-3 days after quitting, but they usually do not last beyond 2-3 weeks. Changes or symptoms that you might experience include:  · Mood swings.  · Restlessness, anxiety, or irritation.  · Difficulty concentrating.  · Dizziness.  · Strong cravings for sugary foods in addition to nicotine.  · Mild weight gain.  · Constipation.  · Nausea.  · Coughing or a sore throat.  · Changes in how your medicines work in your body.  · A depressed mood.  · Difficulty  sleeping (insomnia).    After the first 2-3 weeks of quitting, you may start to notice more positive results, such as:  · Improved sense of smell and taste.  · Decreased coughing and sore throat.  · Slower heart rate.  · Lower blood pressure.  · Clearer skin.  · The ability to breathe more easily.  · Fewer sick days.    Quitting smoking is very challenging for most people. Do not get discouraged if you are not successful the first time. Some people need to make many attempts to quit before they achieve long-term success. Do your best to stick to your quit plan, and talk with your health care provider if you have any questions or concerns.  This information is not intended to replace advice given to you by your health care provider. Make sure you discuss any questions you have with your health care provider.  Document Released: 12/12/2002 Document Revised: 08/15/2017 Document Reviewed: 05/03/2016  [employer] Interactive Patient Education © 2018 [employer]

## 2018-08-29 DIAGNOSIS — E78.2 MIXED HYPERLIPIDEMIA: ICD-10-CM

## 2018-08-29 DIAGNOSIS — I21.4 NON-ST ELEVATED MYOCARDIAL INFARCTION (NON-STEMI) (HCC): ICD-10-CM

## 2018-08-29 RX ORDER — CARVEDILOL 6.25 MG/1
6.25 TABLET ORAL EVERY 12 HOURS SCHEDULED
Qty: 60 TABLET | Refills: 3 | Status: SHIPPED | OUTPATIENT
Start: 2018-08-29 | End: 2018-11-19 | Stop reason: SDUPTHER

## 2018-08-29 RX ORDER — ATORVASTATIN CALCIUM 80 MG/1
80 TABLET, FILM COATED ORAL NIGHTLY
Qty: 30 TABLET | Refills: 3 | Status: SHIPPED | OUTPATIENT
Start: 2018-08-29 | End: 2018-11-19 | Stop reason: SDUPTHER

## 2018-08-29 RX ORDER — ISOSORBIDE MONONITRATE 60 MG/1
60 TABLET, EXTENDED RELEASE ORAL
Qty: 30 TABLET | Refills: 3 | Status: SHIPPED | OUTPATIENT
Start: 2018-08-29 | End: 2018-12-18 | Stop reason: SDUPTHER

## 2018-08-29 RX ORDER — CLOPIDOGREL BISULFATE 75 MG/1
75 TABLET ORAL DAILY
Qty: 30 TABLET | Refills: 3 | Status: SHIPPED | OUTPATIENT
Start: 2018-08-29 | End: 2018-11-19 | Stop reason: SDUPTHER

## 2018-08-29 RX ORDER — AMLODIPINE BESYLATE 10 MG/1
10 TABLET ORAL DAILY
Qty: 30 TABLET | Refills: 3 | Status: SHIPPED | OUTPATIENT
Start: 2018-08-29 | End: 2018-11-13 | Stop reason: SDUPTHER

## 2018-08-29 NOTE — TELEPHONE ENCOUNTER
Patient last seen on 8/3/18 post hospital discharge, she will f/u on 10/3/18 and will also see Dr. De La Rosa on 9/10/18. Refills for amlodipine, clopidogrel, isosorbide, carvedilol, and atorvastatin sent to [facility] on [address].    Radha Chester, Juan PabloD

## 2018-08-31 ENCOUNTER — TREATMENT (OUTPATIENT)
Dept: CARDIAC REHAB | Facility: HOSPITAL | Age: 83
End: 2018-08-31

## 2018-08-31 DIAGNOSIS — I21.4 NSTEMI (NON-ST ELEVATED MYOCARDIAL INFARCTION) (HCC): Primary | ICD-10-CM

## 2018-08-31 PROCEDURE — 93798 PHYS/QHP OP CAR RHAB W/ECG: CPT

## 2018-09-05 ENCOUNTER — TREATMENT (OUTPATIENT)
Dept: CARDIAC REHAB | Facility: HOSPITAL | Age: 83
End: 2018-09-05

## 2018-09-05 DIAGNOSIS — I21.4 NSTEMI (NON-ST ELEVATED MYOCARDIAL INFARCTION) (HCC): Primary | ICD-10-CM

## 2018-09-05 PROCEDURE — 93798 PHYS/QHP OP CAR RHAB W/ECG: CPT

## 2018-09-06 NOTE — PROGRESS NOTES
"[facility] CARDIOLOGY AT [address], [address]  [place], [place], [address] [phone]  [email or web address]           OUTPATIENT CLINIC FOLLOW UP NOTE    Patient Care Team:  Patient Care Team:  Santiago Kuhn MD as PCP - General (Internal Medicine)    Subjective:      Chief Complaint   Patient presents with   • Congestive Heart Failure       HPI:    Varinder Delgado is a 87 y.o. female.  History of Present Illness    The patient has a history of CAD s/p TOSHA to LAD, NSTEMI, diabetes, hypertension, hyperlipidemia, tobacco dependence. The patient presents today for follow-up.    Since the patient was discharged from the hospital she reports doing very well.  She denies any recurrent chest pain or shortness of breath.  She reports starting cardiac rehabilitation. She denies any bleeding or issues with her medications. She reports almost complete smoking cessation, she has not smoked in the past week and only had a few \"puffs\" previously.     Review of Systems:  Negative for exertional chest pain, dyspnea with exertion, orthopnea, PND, lower extremity edema, palpitations, lightheadedness, syncope.    PFSH:  Patient Active Problem List   Diagnosis   • Hypertension   • Diabetes mellitus (CMS/HCC)   • Hyperlipemia   • Tobacco abuse   • NSTEMI (non-ST elevated myocardial infarction) (CMS/HCC)   • Non-ST elevated myocardial infarction (non-STEMI) (CMS/HCC)   • Ischemic cardiomyopathy   • Coronary artery disease involving native coronary artery of native heart without angina pectoris   • Chronic systolic heart failure (CMS/HCC)         Current Outpatient Prescriptions:   •  amLODIPine (NORVASC) 10 MG tablet, Take 1 tablet by mouth Daily., Disp: 30 tablet, Rfl: 3  •  aspirin 81 MG chewable tablet, Chew 81 mg Daily., Disp: , Rfl:   •  atorvastatin (LIPITOR) 80 MG tablet, Take 1 tablet by mouth Every Night., Disp: 30 tablet, Rfl: 3  •  carvedilol (COREG) 6.25 MG tablet, Take 1 tablet by mouth " "Every 12 (Twelve) Hours., Disp: 60 tablet, Rfl: 3  •  clopidogrel (PLAVIX) 75 MG tablet, Take 1 tablet by mouth Daily., Disp: 30 tablet, Rfl: 3  •  furosemide (LASIX) 20 MG tablet, Take 1 tablet by mouth Daily., Disp: 30 tablet, Rfl: 11  •  glipiZIDE (GLUCOTROL) 5 MG tablet, Take 0.5 tablets by mouth Every Morning Before Breakfast., Disp: 30 tablet, Rfl: 0  •  hydrALAZINE (APRESOLINE) 50 MG tablet, Take 1 tablet by mouth Every 8 (Eight) Hours., Disp: 90 tablet, Rfl: 5  •  isosorbide mononitrate (IMDUR) 60 MG 24 hr tablet, Take 1 tablet by mouth Daily., Disp: 30 tablet, Rfl: 3  •  metFORMIN (GLUCOPHAGE) 1000 MG tablet, Take 1 tablet by mouth 2 (Two) Times a Day With Meals. Hold x 3 days- follow up with pcp, Disp: , Rfl:   •  NOVOLOG FLEXPEN 100 UNIT/ML solution pen-injector sc pen, Inject 3 Units under the skin into the appropriate area as directed As Needed., Disp: , Rfl: 1  •  timolol (TIMOPTIC-XR) 0.5 % ophthalmic gel-forming, Administer 1 drop to both eyes 2 (Two) Times a Day., Disp: , Rfl:   •  travoprost, BAK free, (TRAVATAN Z) 0.004 % solution ophthalmic solution, Administer 1 drop to the right eye Every Night., Disp: , Rfl:     Allergies   Allergen Reactions   • Enalapril Angioedema   • Contrast Dye Swelling        reports that she quit smoking about 6 weeks ago. Her smoking use included Cigarettes. She started smoking about 7 weeks ago. She has a 27.50 pack-year smoking history. She has never used smokeless tobacco.    Family History   Problem Relation Age of Onset   • Heart failure Mother    • Liver cancer Father    • Diabetes Brother    • Heart failure Brother    • Kidney disease Brother    • No Known Problems Maternal Grandmother    • No Known Problems Maternal Grandfather    • No Known Problems Paternal Grandmother    • No Known Problems Paternal Grandfather    • Pulmonary embolism Sister    • Diabetes Sister          Objective:   Blood pressure 114/62, pulse 60, height 157.5 cm (62\"), weight 58.4 kg " (128 lb 12.8 oz).  CONSTITUTIONAL: No acute distress, normal affect  RESPIRATORY: Normal effort. Clear to auscultation bilaterally without wheezing or rales  CARDIOVASCULAR: Regular rate and rhythm with normal S1 and S2. Without murmur, gallop or rub.  PERIPHERAL VASCULAR: Normal radial pulses bilaterally. There is no lower extremity edema bilaterally.    Labs:    BUN   Date Value Ref Range Status   08/03/2018 19 9 - 23 mg/dL Final     Creatinine   Date Value Ref Range Status   08/03/2018 1.30 0.60 - 1.30 mg/dL Final     Potassium   Date Value Ref Range Status   08/03/2018 4.6 3.5 - 5.5 mmol/L Final     ALT (SGPT)   Date Value Ref Range Status   07/25/2018 20 7 - 40 U/L Final     AST (SGOT)   Date Value Ref Range Status   07/25/2018 49 (H) 0 - 33 U/L Final       Lab Results   Component Value Date    CHOL 210 (H) 07/28/2018    CHOL 237 (H) 07/26/2018     Lab Results   Component Value Date    TRIG 63 07/28/2018    TRIG 179 (H) 07/26/2018     Lab Results   Component Value Date    HDL 48 07/28/2018    HDL 40 07/26/2018     No components found for: LDLCALC  No results found for: VLDL  No results found for: LDLHDL    Diagnostic Data:    Procedures    TTE 7/2018  · Left ventricular systolic function is moderately decreased. Estimated EF appears to be in the range of 31 - 35%.  · Left ventricular wall thickness is consistent with mild-to-moderate concentric hypertrophy.  · There is left ventricular global hypokinesis noted.  · Left ventricular diastolic dysfunction (grade I a) consistent with impaired relaxation.  · Mild mitral valve regurgitation is present  · There is a moderate (1-2cm) pericardial effusion adjacent to the right ventricle and adjacent to the right atrium.    [facility] 7/2018  · There was severe multivessel coronary artery disease involving the LAD and circumflex arteries.  · There is an 80% discrete proximal to mid LAD stenosis that is now status post intervention with a Xience Jolanta 2.5 x 38 mm drug-eluting  stent with proximal optimization with a 3.0 mm in diameter noncompliant balloon to high atmosphere pressure.  · There is an 80% discrete apical artery stenosis that may be amenable to future balloon angioplasty if the patient has recurrent progressive symptoms.  · There is an 80% discrete stenosis in the distal AV groove artery just before the takeoff of the left PDA.  The segment may be amenable to future balloon angioplasty of the patient has recurrent progressive symptoms.  · There is an intermediate stenosis of the proximal first obtuse marginal branch as well as severe distal segment disease.  · There is an 80% ostial right common iliac artery stenosis with a peak to peak pullback gradient from the distal aorta of 20 mmHg.  Assessment and Plan:   Varinder was seen today for congestive heart failure.    Diagnoses and all orders for this visit:    Coronary artery disease involving native coronary artery of native heart without angina pectoris  -Asymptomatic.  -Continue DAPT, statin, and beta blocker.    Chronic systolic heart failure (CMS/HCC)  -Currently euvolemic. NYHA I  -Continue current related medications  -Most recent EF 31-35% per echo  -Repeat limited echo to re-assess EF    Essential hypertension  -At goal.  -Continue amlodipine, carvedilol, hydralazine.    Mixed hyperlipidemia  -Continue atorvastatin.    Tobacco Dependence  -Cessation counselling provided for greater than 3 min.  -Continued efforts at cessation.  -Chantix starter pack was discussed but patient wishes to       - Return in about 3 months (around 12/10/2018).    MING Prakash obtained past medical, family history, social history, review of systems and functioned as a scribe for the remainder of the dictation for Dr. De La Rosa.    I, Sreekanth De La Rosa MD, personally performed the services as scribed by the above named individual. I have made any necessary edits and it is both accurate and complete.     Sreekanth De La Rosa MD, MSc,  FAC  Interventional Cardiology  [facility]

## 2018-09-07 ENCOUNTER — TREATMENT (OUTPATIENT)
Dept: CARDIAC REHAB | Facility: HOSPITAL | Age: 83
End: 2018-09-07

## 2018-09-07 DIAGNOSIS — I21.4 NSTEMI (NON-ST ELEVATED MYOCARDIAL INFARCTION) (HCC): Primary | ICD-10-CM

## 2018-09-07 PROCEDURE — 93798 PHYS/QHP OP CAR RHAB W/ECG: CPT

## 2018-09-07 NOTE — PROGRESS NOTES
Attended Phase II Cardiac Rehab. No medication or health history changes reported. See [facility] for details.

## 2018-09-10 ENCOUNTER — OFFICE VISIT (OUTPATIENT)
Dept: CARDIOLOGY | Facility: CLINIC | Age: 83
End: 2018-09-10

## 2018-09-10 VITALS
HEIGHT: 62 IN | SYSTOLIC BLOOD PRESSURE: 114 MMHG | HEART RATE: 60 BPM | WEIGHT: 128.8 LBS | BODY MASS INDEX: 23.7 KG/M2 | DIASTOLIC BLOOD PRESSURE: 62 MMHG

## 2018-09-10 DIAGNOSIS — I10 ESSENTIAL HYPERTENSION: ICD-10-CM

## 2018-09-10 DIAGNOSIS — I50.22 CHRONIC SYSTOLIC HEART FAILURE (HCC): ICD-10-CM

## 2018-09-10 DIAGNOSIS — I25.10 CORONARY ARTERY DISEASE INVOLVING NATIVE CORONARY ARTERY OF NATIVE HEART WITHOUT ANGINA PECTORIS: Primary | ICD-10-CM

## 2018-09-10 DIAGNOSIS — E78.2 MIXED HYPERLIPIDEMIA: ICD-10-CM

## 2018-09-10 PROCEDURE — 99214 OFFICE O/P EST MOD 30 MIN: CPT | Performed by: INTERNAL MEDICINE

## 2018-09-10 PROCEDURE — 99406 BEHAV CHNG SMOKING 3-10 MIN: CPT | Performed by: INTERNAL MEDICINE

## 2018-09-12 ENCOUNTER — TREATMENT (OUTPATIENT)
Dept: CARDIAC REHAB | Facility: HOSPITAL | Age: 83
End: 2018-09-12

## 2018-09-12 DIAGNOSIS — I21.4 NSTEMI (NON-ST ELEVATED MYOCARDIAL INFARCTION) (HCC): Primary | ICD-10-CM

## 2018-09-12 PROCEDURE — 93798 PHYS/QHP OP CAR RHAB W/ECG: CPT

## 2018-09-14 ENCOUNTER — TREATMENT (OUTPATIENT)
Dept: CARDIAC REHAB | Facility: HOSPITAL | Age: 83
End: 2018-09-14

## 2018-09-14 DIAGNOSIS — I21.4 NSTEMI (NON-ST ELEVATED MYOCARDIAL INFARCTION) (HCC): Primary | ICD-10-CM

## 2018-09-14 PROCEDURE — 93798 PHYS/QHP OP CAR RHAB W/ECG: CPT

## 2018-09-14 NOTE — PROGRESS NOTES
Attended Phase II Cardiac Rehab. No medication or health history changes reported. See [facility] for details.

## 2018-09-17 ENCOUNTER — TREATMENT (OUTPATIENT)
Dept: CARDIAC REHAB | Facility: HOSPITAL | Age: 83
End: 2018-09-17

## 2018-09-17 DIAGNOSIS — I21.4 NSTEMI (NON-ST ELEVATED MYOCARDIAL INFARCTION) (HCC): Primary | ICD-10-CM

## 2018-09-17 PROCEDURE — 93798 PHYS/QHP OP CAR RHAB W/ECG: CPT

## 2018-09-19 ENCOUNTER — TREATMENT (OUTPATIENT)
Dept: CARDIAC REHAB | Facility: HOSPITAL | Age: 83
End: 2018-09-19

## 2018-09-19 DIAGNOSIS — I21.4 NSTEMI (NON-ST ELEVATED MYOCARDIAL INFARCTION) (HCC): Primary | ICD-10-CM

## 2018-09-19 PROCEDURE — 93798 PHYS/QHP OP CAR RHAB W/ECG: CPT

## 2018-09-19 NOTE — PROGRESS NOTES
Attended Phase II Cardiac Rehab. No medication or health history changes reported. See [facility] for details.

## 2018-09-21 ENCOUNTER — TREATMENT (OUTPATIENT)
Dept: CARDIAC REHAB | Facility: HOSPITAL | Age: 83
End: 2018-09-21

## 2018-09-21 DIAGNOSIS — I21.4 NSTEMI (NON-ST ELEVATED MYOCARDIAL INFARCTION) (HCC): Primary | ICD-10-CM

## 2018-09-21 PROCEDURE — 93798 PHYS/QHP OP CAR RHAB W/ECG: CPT

## 2018-09-21 NOTE — PROGRESS NOTES
Attended Phase II Cardiac Rehab. No medication or health history changes reported. See [facility] for details.

## 2018-09-24 ENCOUNTER — TREATMENT (OUTPATIENT)
Dept: CARDIAC REHAB | Facility: HOSPITAL | Age: 83
End: 2018-09-24

## 2018-09-24 DIAGNOSIS — I21.4 NSTEMI (NON-ST ELEVATED MYOCARDIAL INFARCTION) (HCC): Primary | ICD-10-CM

## 2018-09-24 PROCEDURE — 93798 PHYS/QHP OP CAR RHAB W/ECG: CPT

## 2018-09-24 NOTE — PROGRESS NOTES
Attended Phase II Cardiac Rehab. No medication or health history changes reported. See [facility] for details.

## 2018-09-26 ENCOUNTER — TELEPHONE (OUTPATIENT)
Dept: CARDIAC REHAB | Facility: HOSPITAL | Age: 83
End: 2018-09-26

## 2018-09-28 ENCOUNTER — TREATMENT (OUTPATIENT)
Dept: CARDIAC REHAB | Facility: HOSPITAL | Age: 83
End: 2018-09-28

## 2018-09-28 DIAGNOSIS — I21.4 NSTEMI (NON-ST ELEVATED MYOCARDIAL INFARCTION) (HCC): Primary | ICD-10-CM

## 2018-09-28 PROCEDURE — 93798 PHYS/QHP OP CAR RHAB W/ECG: CPT

## 2018-10-01 ENCOUNTER — TREATMENT (OUTPATIENT)
Dept: CARDIAC REHAB | Facility: HOSPITAL | Age: 83
End: 2018-10-01

## 2018-10-01 DIAGNOSIS — I21.4 NSTEMI (NON-ST ELEVATED MYOCARDIAL INFARCTION) (HCC): Primary | ICD-10-CM

## 2018-10-01 PROCEDURE — 93798 PHYS/QHP OP CAR RHAB W/ECG: CPT

## 2018-10-02 NOTE — PROGRESS NOTES
Encounter Date:10/03/2018      Patient ID: Varinder Delgado is a 87 y.o. female.        Subjective:     Chief Complaint: Follow-up   [facility]  History of Present Illness patient presents to the office today for ongoing evaluation of her chronic systolic heart failure. She is doing well from a  Cardiac standpoint. Patient denies chest pain, chest pressure, palpitations, tachycardia, dyspnea, presyncope, syncope, orthopnea, PND, abdominal fullness, early satiety, claudication, cough or edema. She is participating in cardiac rehab.    Patient Active Problem List   Diagnosis   • Hypertension   • Diabetes mellitus (CMS/HCC)   • Hyperlipemia   • Tobacco abuse   • NSTEMI (non-ST elevated myocardial infarction) (CMS/HCC)   • Non-ST elevated myocardial infarction (non-STEMI) (CMS/HCC)   • Ischemic cardiomyopathy   • Coronary artery disease involving native coronary artery of native heart without angina pectoris   • Chronic systolic heart failure (CMS/HCC)       Past Surgical History:   Procedure Laterality Date   • CARDIAC CATHETERIZATION N/A 7/27/2018    Procedure: Left Heart Cath;  Surgeon: Sreekanth De La Rosa MD;  Location: [facility] CATH INVASIVE LOCATION;  Service: Cardiology   • MOUTH SURGERY         Allergies   Allergen Reactions   • Enalapril Angioedema   • Contrast Dye Swelling         Current Outpatient Prescriptions:   •  amLODIPine (NORVASC) 10 MG tablet, Take 1 tablet by mouth Daily., Disp: 30 tablet, Rfl: 3  •  aspirin 81 MG chewable tablet, Chew 81 mg Daily., Disp: , Rfl:   •  atorvastatin (LIPITOR) 80 MG tablet, Take 1 tablet by mouth Every Night., Disp: 30 tablet, Rfl: 3  •  carvedilol (COREG) 6.25 MG tablet, Take 1 tablet by mouth Every 12 (Twelve) Hours., Disp: 60 tablet, Rfl: 3  •  clopidogrel (PLAVIX) 75 MG tablet, Take 1 tablet by mouth Daily., Disp: 30 tablet, Rfl: 3  •  furosemide (LASIX) 20 MG tablet, Take 1 tablet by mouth Daily., Disp: 30 tablet, Rfl: 11  •  glipiZIDE (GLUCOTROL) 5 MG tablet, Take 0.5  tablets by mouth Every Morning Before Breakfast., Disp: 30 tablet, Rfl: 0  •  hydrALAZINE (APRESOLINE) 50 MG tablet, Take 1 tablet by mouth Every 8 (Eight) Hours., Disp: 90 tablet, Rfl: 5  •  isosorbide mononitrate (IMDUR) 60 MG 24 hr tablet, Take 1 tablet by mouth Daily., Disp: 30 tablet, Rfl: 3  •  metFORMIN (GLUCOPHAGE) 1000 MG tablet, Take 1 tablet by mouth 2 (Two) Times a Day With Meals. Hold x 3 days- follow up with pcp, Disp: , Rfl:   •  NOVOLOG FLEXPEN 100 UNIT/ML solution pen-injector sc pen, Inject 3 Units under the skin into the appropriate area as directed As Needed., Disp: , Rfl: 1  •  timolol (TIMOPTIC-XR) 0.5 % ophthalmic gel-forming, Administer 1 drop to both eyes 2 (Two) Times a Day., Disp: , Rfl:   •  travoprost, BAK free, (TRAVATAN Z) 0.004 % solution ophthalmic solution, Administer 1 drop to the right eye Every Night., Disp: , Rfl:     The following portions of the chart were reviewed and updated as appropriate: Allergies, current medications, past family history, social history, past medical history.     Review of Systems   Constitution: Negative for chills, decreased appetite, diaphoresis, fever, weakness, malaise/fatigue, night sweats, weight gain and weight loss.   HENT: Negative for congestion, hearing loss, hoarse voice and nosebleeds.    Eyes: Negative for blurred vision, visual disturbance and visual halos.   Cardiovascular: Negative for chest pain, claudication, cyanosis, dyspnea on exertion, irregular heartbeat, leg swelling, near-syncope, orthopnea, palpitations, paroxysmal nocturnal dyspnea and syncope.   Respiratory: Negative for cough, hemoptysis, shortness of breath, sleep disturbances due to breathing, snoring, sputum production and wheezing.    Hematologic/Lymphatic: Negative for bleeding problem. Does not bruise/bleed easily.   Skin: Negative for dry skin, itching and rash.   Musculoskeletal: Negative for arthritis, falls, joint pain, joint swelling and myalgias.  "  Gastrointestinal: Negative for bloating, abdominal pain, constipation, diarrhea, flatus, heartburn, hematemesis, hematochezia, melena, nausea and vomiting.   Genitourinary: Negative for dysuria, frequency, hematuria, nocturia and urgency.   Neurological: Negative for excessive daytime sleepiness, dizziness, headaches, light-headedness and loss of balance.   Psychiatric/Behavioral: Negative for depression. The patient does not have insomnia and is not nervous/anxious.            Objective:     Vitals:    10/03/18 0952 10/03/18 0954 10/03/18 0955 10/03/18 1012   BP: 161/77 165/75 158/64 138/79   BP Location: Right arm Left arm Left arm Left arm   Patient Position: Sitting Sitting Standing Sitting   Cuff Size: Adult      Pulse: 73 75 79    Resp: 16      Temp: 97.2 °F (36.2 °C)      TempSrc: Temporal Artery       SpO2: 99%      Weight: 59.4 kg (131 lb)      Height: 157.5 cm (62\")            Physical Exam   Constitutional: She is oriented to person, place, and time. She appears well-developed and well-nourished. She is active and cooperative. No distress.   HENT:   Head: Normocephalic and atraumatic.   Mouth/Throat: Oropharynx is clear and moist.   Eyes: Pupils are equal, round, and reactive to light. Conjunctivae and EOM are normal.   Neck: Normal range of motion. Neck supple. No JVD present. No tracheal deviation present. No thyromegaly present.   Cardiovascular: Normal rate, regular rhythm, normal heart sounds and intact distal pulses.    Pulmonary/Chest: Effort normal and breath sounds normal.   Abdominal: Soft. Bowel sounds are normal. She exhibits no distension. There is no tenderness.   Musculoskeletal: Normal range of motion.   Neurological: She is alert and oriented to person, place, and time.   Skin: Skin is warm, dry and intact.   Psychiatric: She has a normal mood and affect. Her behavior is normal.   Nursing note and vitals reviewed.      Lab and Diagnostic Review:      Lab Results   Component Value Date "    GLUCOSE 158 (H) 08/03/2018    CALCIUM 9.1 08/03/2018     08/03/2018    K 4.6 08/03/2018    CO2 23.0 08/03/2018     08/03/2018    BUN 19 08/03/2018    CREATININE 1.30 08/03/2018    EGFRIFAFRI 47 (L) 08/03/2018    BCR 14.6 08/03/2018    ANIONGAP 7.0 08/03/2018           Assessment and Plan:         1. Chronic systolic heart failure (CMS/[facility])  euvolemic  Heart failure education today including signs and symptoms, the role of the heart failure center, daily weights, low sodium diet (less than 1500 mg per day), and daily physical activity. Reviewed HF Zones with patient and family.  Patient to continue current medications as previously ordered.     2. Coronary artery disease involving native coronary artery of native heart without angina pectoris  Without angina  Continue asa, lipitor, coreg    3. Essential hypertension    HTN Education provided today including signs and symptoms, medication management, daily blood pressure monitoring. Patient encouraged to call the [facility] center with any abnormal readings.     4. Tobacco abuse  'patient did a trial of chantix and noted that she did not feel well while taking it. She has stopped smoking on her own.    Echo on 10/22/18 to reassess EF    It has been a pleasure to participate in the care of this patient.  Patient was instructed to call the [facility] with any questions, concerns, or worsening symptoms.        * Please note that portions of this note were completed with a voice recognition program. Efforts were made to edit the dictation but occasionally words are transcribed.

## 2018-10-03 ENCOUNTER — OFFICE VISIT (OUTPATIENT)
Dept: CARDIOLOGY | Facility: HOSPITAL | Age: 83
End: 2018-10-03

## 2018-10-03 DIAGNOSIS — I50.22 CHRONIC SYSTOLIC HEART FAILURE (HCC): Primary | ICD-10-CM

## 2018-10-03 DIAGNOSIS — I25.10 CORONARY ARTERY DISEASE INVOLVING NATIVE CORONARY ARTERY OF NATIVE HEART WITHOUT ANGINA PECTORIS: ICD-10-CM

## 2018-10-03 DIAGNOSIS — Z72.0 TOBACCO ABUSE: ICD-10-CM

## 2018-10-03 DIAGNOSIS — I10 ESSENTIAL HYPERTENSION: ICD-10-CM

## 2018-10-03 PROCEDURE — 99214 OFFICE O/P EST MOD 30 MIN: CPT | Performed by: NURSE PRACTITIONER

## 2018-10-04 VITALS
RESPIRATION RATE: 16 BRPM | SYSTOLIC BLOOD PRESSURE: 138 MMHG | DIASTOLIC BLOOD PRESSURE: 79 MMHG | BODY MASS INDEX: 24.11 KG/M2 | WEIGHT: 131 LBS | HEART RATE: 79 BPM | OXYGEN SATURATION: 99 % | HEIGHT: 62 IN | TEMPERATURE: 97.2 F

## 2018-10-05 ENCOUNTER — TREATMENT (OUTPATIENT)
Dept: CARDIAC REHAB | Facility: HOSPITAL | Age: 83
End: 2018-10-05

## 2018-10-05 DIAGNOSIS — I21.4 NSTEMI (NON-ST ELEVATED MYOCARDIAL INFARCTION) (HCC): Primary | ICD-10-CM

## 2018-10-05 PROCEDURE — 93798 PHYS/QHP OP CAR RHAB W/ECG: CPT

## 2018-10-05 NOTE — PROGRESS NOTES
Attended Phase II Cardiac Rehab. No medication or health history changes reported. See [facility] for details.

## 2018-10-08 ENCOUNTER — TREATMENT (OUTPATIENT)
Dept: CARDIAC REHAB | Facility: HOSPITAL | Age: 83
End: 2018-10-08

## 2018-10-08 DIAGNOSIS — I21.4 NSTEMI (NON-ST ELEVATED MYOCARDIAL INFARCTION) (HCC): Primary | ICD-10-CM

## 2018-10-08 PROCEDURE — 93798 PHYS/QHP OP CAR RHAB W/ECG: CPT

## 2018-10-10 ENCOUNTER — TREATMENT (OUTPATIENT)
Dept: CARDIAC REHAB | Facility: HOSPITAL | Age: 83
End: 2018-10-10

## 2018-10-10 DIAGNOSIS — I21.4 NSTEMI (NON-ST ELEVATED MYOCARDIAL INFARCTION) (HCC): Primary | ICD-10-CM

## 2018-10-10 PROCEDURE — 93798 PHYS/QHP OP CAR RHAB W/ECG: CPT

## 2018-10-12 ENCOUNTER — TREATMENT (OUTPATIENT)
Dept: CARDIAC REHAB | Facility: HOSPITAL | Age: 83
End: 2018-10-12

## 2018-10-12 DIAGNOSIS — I21.4 NSTEMI (NON-ST ELEVATED MYOCARDIAL INFARCTION) (HCC): Primary | ICD-10-CM

## 2018-10-12 PROCEDURE — 93798 PHYS/QHP OP CAR RHAB W/ECG: CPT

## 2018-10-17 ENCOUNTER — TREATMENT (OUTPATIENT)
Dept: CARDIAC REHAB | Facility: HOSPITAL | Age: 83
End: 2018-10-17

## 2018-10-17 DIAGNOSIS — I21.4 NSTEMI (NON-ST ELEVATED MYOCARDIAL INFARCTION) (HCC): Primary | ICD-10-CM

## 2018-10-17 PROCEDURE — 93798 PHYS/QHP OP CAR RHAB W/ECG: CPT

## 2018-10-17 NOTE — PROGRESS NOTES
Attended Phase II Cardiac Rehab. No medication or health history changes reported. See [facility] for details.

## 2018-10-19 ENCOUNTER — TREATMENT (OUTPATIENT)
Dept: CARDIAC REHAB | Facility: HOSPITAL | Age: 83
End: 2018-10-19

## 2018-10-19 DIAGNOSIS — I21.4 NSTEMI (NON-ST ELEVATED MYOCARDIAL INFARCTION) (HCC): Primary | ICD-10-CM

## 2018-10-19 PROCEDURE — 93798 PHYS/QHP OP CAR RHAB W/ECG: CPT

## 2018-10-19 NOTE — PROGRESS NOTES
Attended Phase II Cardiac Rehab. No medication or health history changes reported. See [facility] for details.

## 2018-10-22 ENCOUNTER — TREATMENT (OUTPATIENT)
Dept: CARDIAC REHAB | Facility: HOSPITAL | Age: 83
End: 2018-10-22

## 2018-10-22 ENCOUNTER — HOSPITAL ENCOUNTER (OUTPATIENT)
Dept: CARDIOLOGY | Facility: HOSPITAL | Age: 83
Discharge: HOME | End: 2018-10-22
Attending: INTERNAL MEDICINE | Admitting: INTERNAL MEDICINE

## 2018-10-22 DIAGNOSIS — I50.22 CHRONIC SYSTOLIC HEART FAILURE (HCC): ICD-10-CM

## 2018-10-22 DIAGNOSIS — I25.10 CORONARY ARTERY DISEASE INVOLVING NATIVE CORONARY ARTERY OF NATIVE HEART WITHOUT ANGINA PECTORIS: ICD-10-CM

## 2018-10-22 DIAGNOSIS — I21.4 NSTEMI (NON-ST ELEVATED MYOCARDIAL INFARCTION) (HCC): Primary | ICD-10-CM

## 2018-10-22 LAB
BH CV ECHO MEAS - BSA(HAYCOCK): 1.6 M^2
BH CV ECHO MEAS - BSA: 1.6 M^2
BH CV ECHO MEAS - BZI_BMI: 24 KILOGRAMS/M^2
BH CV ECHO MEAS - BZI_METRIC_HEIGHT: 157.5 CM
BH CV ECHO MEAS - BZI_METRIC_WEIGHT: 59.4 KG
BH CV ECHO MEAS - EDV(CUBED): 21.3 ML
BH CV ECHO MEAS - EDV(MOD-SP2): 35 ML
BH CV ECHO MEAS - EDV(MOD-SP4): 39 ML
BH CV ECHO MEAS - EDV(TEICH): 28.8 ML
BH CV ECHO MEAS - EF(CUBED): 81.5 %
BH CV ECHO MEAS - EF(MOD-BP): 53 %
BH CV ECHO MEAS - EF(MOD-SP2): 51.4 %
BH CV ECHO MEAS - EF(MOD-SP4): 51.3 %
BH CV ECHO MEAS - EF(TEICH): 75.9 %
BH CV ECHO MEAS - ESV(CUBED): 3.9 ML
BH CV ECHO MEAS - ESV(MOD-SP2): 17 ML
BH CV ECHO MEAS - ESV(MOD-SP4): 19 ML
BH CV ECHO MEAS - ESV(TEICH): 6.9 ML
BH CV ECHO MEAS - FS: 43 %
BH CV ECHO MEAS - IVS/LVPW: 0.98
BH CV ECHO MEAS - IVSD: 1.4 CM
BH CV ECHO MEAS - LV DIASTOLIC VOL/BSA (35-75): 24.4 ML/M^2
BH CV ECHO MEAS - LV MASS(C)D: 129.3 GRAMS
BH CV ECHO MEAS - LV MASS(C)DI: 81 GRAMS/M^2
BH CV ECHO MEAS - LV SYSTOLIC VOL/BSA (12-30): 11.9 ML/M^2
BH CV ECHO MEAS - LVIDD: 2.8 CM
BH CV ECHO MEAS - LVIDS: 1.6 CM
BH CV ECHO MEAS - LVLD AP2: 7.1 CM
BH CV ECHO MEAS - LVLD AP4: 6.6 CM
BH CV ECHO MEAS - LVLS AP2: 5.8 CM
BH CV ECHO MEAS - LVLS AP4: 5.5 CM
BH CV ECHO MEAS - LVPWD: 1.4 CM
BH CV ECHO MEAS - SI(CUBED): 10.9 ML/M^2
BH CV ECHO MEAS - SI(MOD-SP2): 11.3 ML/M^2
BH CV ECHO MEAS - SI(MOD-SP4): 12.5 ML/M^2
BH CV ECHO MEAS - SI(TEICH): 13.7 ML/M^2
BH CV ECHO MEAS - SV(CUBED): 17.3 ML
BH CV ECHO MEAS - SV(MOD-SP2): 18 ML
BH CV ECHO MEAS - SV(MOD-SP4): 20 ML
BH CV ECHO MEAS - SV(TEICH): 21.9 ML
BH CV VAS BP RIGHT ARM: NORMAL MMHG
LV EF 2D ECHO EST: 60 %
MAXIMAL PREDICTED HEART RATE: 133 BPM
STRESS TARGET HR: 113 BPM

## 2018-10-22 PROCEDURE — 93308 TTE F-UP OR LMTD: CPT | Performed by: INTERNAL MEDICINE

## 2018-10-22 PROCEDURE — 93798 PHYS/QHP OP CAR RHAB W/ECG: CPT

## 2018-10-22 PROCEDURE — 93308 TTE F-UP OR LMTD: CPT

## 2018-10-24 ENCOUNTER — TREATMENT (OUTPATIENT)
Dept: CARDIAC REHAB | Facility: HOSPITAL | Age: 83
End: 2018-10-24

## 2018-10-24 DIAGNOSIS — I21.4 NSTEMI (NON-ST ELEVATED MYOCARDIAL INFARCTION) (HCC): Primary | ICD-10-CM

## 2018-10-24 PROCEDURE — 93798 PHYS/QHP OP CAR RHAB W/ECG: CPT

## 2018-10-24 NOTE — PATIENT INSTRUCTIONS
The American Heart Association recommends 150 minutes of aerobic exercise per week above and beyond your activities of daily living.  Your target heart rate is .  Your maximum heart rate for exercise is 149 and should not be sustained for long periods of time.  These numbers are based on age and should be evaluated yearly.      Exercise Guidelines During Cardiac Rehabilitation  When you are recovering from a heart condition, such as from heart surgery, heart attack, or heart failure, it is important to have heart-healthy habits, including exercise routines. Discuss an appropriate exercise program with your heart specialist (cardiologist) and rehabilitation therapist.  It is important to design a program that is safe and effective for you. The program should meet your specific abilities and needs. Walking, biking, jogging, and swimming are all good aerobic activities. These take light to moderate effort. Adding some light resistance training is also important. Even simple lifestyle changes can help. These lifestyle changes may include parking farther from the store or taking the stairs instead of the elevator.  At first, you may begin exercising under supervision, such as at a hospital or clinic. Over time, you may begin exercising at home, with your health care provider's approval.  Types of exercise  Aerobic exercise  During cardiac rehabilitation, it is important to do aerobic activities. Aerobic exercise keeps joints and muscles moving. It involves large muscle groups. It is also rhythmic and must be done for a longer period of time. Doing these exercises improves circulation and endurance. Examples of aerobic exercise include:  · Swimming.  · Walking.  · Hiking.  · Jogging.  · Cross-country skiing.  · Biking.  · Dancing.    Static exercise  Static exercise (isometric exercise) uses muscles at high intensities without moving the joints. Some examples of static exercise include pushing against a heavy couch  that does not move, doing a wall sit, or holding a plank position. Static exercise improves strength but also quickly increases blood pressure. Follow these guidelines:  · If you have circulation problems or high blood pressure, talk with your health care provider before starting any static exercise routines. Do not do static exercises if your health care provider tells you not to.  · Do not hold your breath while doing static exercises. Holding your breath during static exercises can raise your blood pressure to a dangerously high level.    Weight-resistance exercise  Weight-resistance exercises are another important part of rehabilitation. These exercises strengthen your muscles by making them work against resistance. Resistance exercises may help you return to activities of daily living sooner and improve your quality of life. They also help reduce cardiac risk factors. Examples of weight-resistance exercise include using:  · Free weights.  · Weight-lifting machines.  · Large, specially designed rubber bands.    You will usually do weight-resistance exercises 2 times a week with a 2-day rest period between workouts.  Stretching  Stretching before you exercise warms up your muscles and prevents injury. Stretching also improves your flexibility, balance, coordination, and range of motion. Follow these guidelines:  · Stretch both before and after exercising.  · Do not force a muscle or joint into a painful angle. Stretching should be a relaxing part of your exercise routine.  · Once you feel resistance in your muscle, hold the stretch for a few seconds. Make sure you keep breathing while you hold the stretch.  · Go slowly when doing all stretches.    Setting a pace  · Choose a pace that is comfortable for you.  ? You should be able to talk while exercising. If you are short of breath or unable to speak while you exercise, slow down.  ? If you are able to sing while exercising, you are not exercising hard  "enough.  · Keep track of how hard you are working as you exercise (exertion level). Your rehabilitation therapist can teach you to use a mental scale to measure your level of exertion (perceived exertion). Using a mental scale, you will think about your exertion level and rate it in a range from 6 to 20.  ? A rating of 6 to 9. This means that you are doing \"very light\" exercise and are not exerting yourself enough. For a healthy person, this may be walking at a slow pace.  ? A rating of 11 to 15. This is exercise that is \"somewhat hard.\" For a healthy exercise session, you should aim for an exertion rate that is within this range.  ? A rating of 16 to 17. This is considered \"very hard\" or strenuous. For a healthy person, exercise at this rating may start to feel heavy and difficult.  ? A rating of 19 to 20. This means that you are working \"extremely hard.\" For most people, these numbers represent the hardest you've ever worked to exercise.  · Your health care provider or cardiac rehabilitation specialist may also recommend that you wear a heart rate monitor while you exercise. This will help you keep track of your heart rate zones and how hard your heart is working.  Frequency  As you are recovering, it is important to start exercising slowly and to gradually work up to your goal. Work with your health care provider to set up an exercise routine that works for you. Generally, cardiac rehabilitation exercise should include:  · 40 minutes of aerobic activity 3 - 4 days a week.  · Stretching and strength exercises 2 - 3 days a week.    Contact a doctor if:  · You have any of the following symptoms while exercising:  ? Pain, pressure, or burning in your chest, jaw, shoulder, or back (angina).  ? Lightheadedness.  ? Dizziness.  ? Irregular or fast heartbeat.  ? Shortness of breath.  · You are extremely tired after exercising.  Get help right away if:  · You have angina that does not get better with medicine and lasts for " more than 5 minutes.  · You have nausea or you vomit.  · You have excessive sweating that is not caused by exercise.  Summary  · When you are recovering from a heart condition, it is important to have heart-healthy habits, including exercise routines.  · At first, you may begin exercising under supervision, such as at a hospital or clinic. Over time, you may begin exercising at home, with your health care provider's approval.  · Aim for 40 minutes of aerobic exercises 3 - 4 days a week.  · Aim to do stretching and strength exercises 2 - 3 days a week.  · Choose a pace that is comfortable for you. You should be able to talk while exercising.  This information is not intended to replace advice given to you by your health care provider. Make sure you discuss any questions you have with your health care provider.  Document Released: 12/23/2014 Document Revised: 11/17/2017 Document Reviewed: 11/17/2017  [employer] Interactive Patient Education © 2018 [employer]    Heart-Healthy Eating Plan  Heart-healthy meal planning includes:  · Limiting unhealthy fats.  · Increasing healthy fats.  · Making other small dietary changes.    You may need to talk with your doctor or a diet specialist (dietitian) to create an eating plan that is right for you.  What types of fat should I choose?  · Choose healthy fats. These include olive oil and canola oil, flaxseeds, walnuts, almonds, and seeds.  · Eat more omega-3 fats. These include salmon, mackerel, sardines, tuna, flaxseed oil, and ground flaxseeds. Try to eat fish at least twice each week.  · Limit saturated fats.  ? Saturated fats are often found in animal products, such as meats, butter, and cream.  ? Plant sources of saturated fats include palm oil, palm kernel oil, and coconut oil.  · Avoid foods with partially hydrogenated oils in them. These include stick margarine, some tub margarines, cookies, crackers, and other baked goods. These contain trans fats.  What general  "guidelines do I need to follow?  · Check food labels carefully. Identify foods with trans fats or high amounts of saturated fat.  · Fill one half of your plate with vegetables and green salads. Eat 4-5 servings of vegetables per day. A serving of vegetables is:  ? 1 cup of raw leafy vegetables.  ? ½ cup of raw or cooked cut-up vegetables.  ? ½ cup of vegetable juice.  · Fill one fourth of your plate with whole grains. Look for the word \"whole\" as the first word in the ingredient list.  · Fill one fourth of your plate with lean protein foods.  · Eat 4-5 servings of fruit per day. A serving of fruit is:  ? One medium whole fruit.  ? ¼ cup of dried fruit.  ? ½ cup of fresh, frozen, or canned fruit.  ? ½ cup of 100% fruit juice.  · Eat more foods that contain soluble fiber. These include apples, broccoli, carrots, beans, peas, and barley. Try to get 20-30 g of fiber per day.  · Eat more home-cooked food. Eat less restaurant, buffet, and fast food.  · Limit or avoid alcohol.  · Limit foods high in starch and sugar.  · Avoid fried foods.  · Avoid frying your food. Try baking, boiling, grilling, or broiling it instead. You can also reduce fat by:  ? Removing the skin from poultry.  ? Removing all visible fats from meats.  ? Skimming the fat off of stews, soups, and gravies before serving them.  ? Steaming vegetables in water or broth.  · Lose weight if you are overweight.  · Eat 4-5 servings of nuts, legumes, and seeds per week:  ? One serving of dried beans or legumes equals ½ cup after being cooked.  ? One serving of nuts equals 1½ ounces.  ? One serving of seeds equals ½ ounce or one tablespoon.  · You may need to keep track of how much salt or sodium you eat. This is especially true if you have high blood pressure. Talk with your doctor or dietitian to get more information.  What foods can I eat?  Grains  Breads, including Armenian, white, ivis, wheat, raisin, rye, oatmeal, and Italian. Tortillas that are neither fried " nor made with lard or trans fat. Low-fat rolls, including hotdog and hamburger buns and English muffins. Biscuits. Muffins. Waffles. Pancakes. Light popcorn. Whole-grain cereals. Flatbread. Anita toast. Pretzels. Breadsticks. Rusks. Low-fat snacks. Low-fat crackers, including oyster, saltine, matzo, ashley, animal, and rye. Rice and pasta, including brown rice and pastas that are made with whole wheat.  Vegetables  All vegetables.  Fruits  All fruits, but limit coconut.  Meats and Other Protein Sources  Lean, well-trimmed beef, veal, pork, and lamb. Chicken and turkey without skin. All fish and shellfish. Wild duck, rabbit, pheasant, and venison. Egg whites or low-cholesterol egg substitutes. Dried beans, peas, lentils, and tofu. Seeds and most nuts.  Dairy  Low-fat or nonfat cheeses, including ricotta, string, and mozzarella. Skim or 1% milk that is liquid, powdered, or evaporated. Buttermilk that is made with low-fat milk. Nonfat or low-fat yogurt.  Beverages  Mineral water. Diet carbonated beverages.  Sweets and Desserts  Sherbets and fruit ices. Honey, jam, marmalade, jelly, and syrups. Meringues and gelatins. Pure sugar candy, such as hard candy, jelly beans, gumdrops, mints, marshmallows, and small amounts of dark chocolate. Wily food cake.  Eat all sweets and desserts in moderation.  Fats and Oils  Nonhydrogenated (trans-free) margarines. Vegetable oils, including soybean, sesame, sunflower, olive, peanut, safflower, corn, canola, and cottonseed. Salad dressings or mayonnaise made with a vegetable oil. Limit added fats and oils that you use for cooking, baking, salads, and as spreads.  Other  Cocoa powder. Coffee and tea. All seasonings and condiments.  The items listed above may not be a complete list of recommended foods or beverages. Contact your dietitian for more options.  What foods are not recommended?  Grains  Breads that are made with saturated or trans fats, oils, or whole milk. Croissants. Butter  rolls. Cheese breads. Sweet rolls. Donuts. Buttered popcorn. Chow mein noodles. High-fat crackers, such as cheese or butter crackers.  Meats and Other Protein Sources  Fatty meats, such as hotdogs, short ribs, sausage, spareribs, booker, rib eye roast or steak, and mutton. High-fat deli meats, such as salami and bologna. Caviar. Domestic duck and goose. Organ meats, such as kidney, liver, sweetbreads, and heart.  Dairy  Cream, sour cream, cream cheese, and creamed cottage cheese. Whole-milk cheeses, including blue (cecille), Geyser Antolin, Brie, Justin, American, Havarti, Swiss, cheddar, Camembert, and [place]. Whole or 2% milk that is liquid, evaporated, or condensed. Whole buttermilk. Cream sauce or high-fat cheese sauce. Yogurt that is made from whole milk.  Beverages  Regular sodas and juice drinks with added sugar.  Sweets and Desserts  Frosting. Pudding. Cookies. Cakes other than desiree food cake. Candy that has milk chocolate or white chocolate, hydrogenated fat, butter, coconut, or unknown ingredients. Buttered syrups. Full-fat ice cream or ice cream drinks.  Fats and Oils  Gravy that has suet, meat fat, or shortening. Cocoa butter, hydrogenated oils, palm oil, coconut oil, palm kernel oil. These can often be found in baked products, candy, fried foods, nondairy creamers, and whipped toppings. Solid fats and shortenings, including booker fat, salt pork, lard, and butter. Nondairy cream substitutes, such as coffee creamers and sour cream substitutes. Salad dressings that are made of unknown oils, cheese, or sour cream.  The items listed above may not be a complete list of foods and beverages to avoid. Contact your dietitian for more information.  This information is not intended to replace advice given to you by your health care provider. Make sure you discuss any questions you have with your health care provider.  Document Released: 06/18/2013 Document Revised: 05/25/2017 Document Reviewed: 06/11/2015  Elsevier  Interactive Patient Education © 2018 [employer] Inc.

## 2018-10-24 NOTE — PROGRESS NOTES
Attended Phase II Cardiac Rehab. No medication or health history changes reported. See [facility] for details.

## 2018-10-26 ENCOUNTER — TREATMENT (OUTPATIENT)
Dept: CARDIAC REHAB | Facility: HOSPITAL | Age: 83
End: 2018-10-26

## 2018-10-26 DIAGNOSIS — I21.4 NSTEMI (NON-ST ELEVATED MYOCARDIAL INFARCTION) (HCC): Primary | ICD-10-CM

## 2018-10-26 PROCEDURE — 93798 PHYS/QHP OP CAR RHAB W/ECG: CPT

## 2018-10-29 ENCOUNTER — TREATMENT (OUTPATIENT)
Dept: CARDIAC REHAB | Facility: HOSPITAL | Age: 83
End: 2018-10-29

## 2018-10-29 DIAGNOSIS — I21.4 NSTEMI (NON-ST ELEVATED MYOCARDIAL INFARCTION) (HCC): Primary | ICD-10-CM

## 2018-10-29 PROCEDURE — 93798 PHYS/QHP OP CAR RHAB W/ECG: CPT

## 2018-10-29 NOTE — PROGRESS NOTES
Attended Phase II Cardiac Rehab. No medication or health history changes reported. See [facility] for details.

## 2018-10-31 ENCOUNTER — TREATMENT (OUTPATIENT)
Dept: CARDIAC REHAB | Facility: HOSPITAL | Age: 83
End: 2018-10-31

## 2018-10-31 DIAGNOSIS — I21.4 NSTEMI (NON-ST ELEVATED MYOCARDIAL INFARCTION) (HCC): Primary | ICD-10-CM

## 2018-10-31 PROCEDURE — 93798 PHYS/QHP OP CAR RHAB W/ECG: CPT

## 2018-11-02 ENCOUNTER — TREATMENT (OUTPATIENT)
Dept: CARDIAC REHAB | Facility: HOSPITAL | Age: 83
End: 2018-11-02

## 2018-11-02 DIAGNOSIS — I21.4 NSTEMI (NON-ST ELEVATED MYOCARDIAL INFARCTION) (HCC): Primary | ICD-10-CM

## 2018-11-02 PROCEDURE — 93798 PHYS/QHP OP CAR RHAB W/ECG: CPT

## 2018-11-02 NOTE — PROGRESS NOTES
Attended Phase II Cardiac Rehab. No medication or health history changes reported. See [facility] for details.

## 2018-11-05 ENCOUNTER — TELEPHONE (OUTPATIENT)
Dept: CARDIOLOGY | Facility: CLINIC | Age: 83
End: 2018-11-05

## 2018-11-05 RX ORDER — FUROSEMIDE 20 MG/1
20 TABLET ORAL DAILY
Qty: 30 TABLET | Refills: 11 | Status: SHIPPED | OUTPATIENT
Start: 2018-11-05 | End: 2019-07-15

## 2018-11-13 RX ORDER — AMLODIPINE BESYLATE 10 MG/1
10 TABLET ORAL DAILY
Qty: 90 TABLET | Refills: 3 | Status: SHIPPED | OUTPATIENT
Start: 2018-11-13 | End: 2020-03-30 | Stop reason: SDUPTHER

## 2018-11-19 DIAGNOSIS — E78.2 MIXED HYPERLIPIDEMIA: ICD-10-CM

## 2018-11-19 DIAGNOSIS — I21.4 NON-ST ELEVATED MYOCARDIAL INFARCTION (NON-STEMI) (HCC): ICD-10-CM

## 2018-11-19 RX ORDER — CARVEDILOL 6.25 MG/1
6.25 TABLET ORAL EVERY 12 HOURS SCHEDULED
Qty: 180 TABLET | Refills: 1 | Status: SHIPPED | OUTPATIENT
Start: 2018-11-19 | End: 2019-06-10 | Stop reason: SDUPTHER

## 2018-11-19 RX ORDER — CLOPIDOGREL BISULFATE 75 MG/1
75 TABLET ORAL DAILY
Qty: 90 TABLET | Refills: 1 | Status: SHIPPED | OUTPATIENT
Start: 2018-11-19 | End: 2019-05-16 | Stop reason: SDUPTHER

## 2018-11-19 RX ORDER — ATORVASTATIN CALCIUM 80 MG/1
80 TABLET, FILM COATED ORAL NIGHTLY
Qty: 90 TABLET | Refills: 1 | Status: SHIPPED | OUTPATIENT
Start: 2018-11-19 | End: 2019-05-16 | Stop reason: SDUPTHER

## 2018-11-26 RX ORDER — HYDRALAZINE HYDROCHLORIDE 50 MG/1
50 TABLET, FILM COATED ORAL EVERY 8 HOURS SCHEDULED
Qty: 270 TABLET | Refills: 3 | Status: ON HOLD | OUTPATIENT
Start: 2018-11-26 | End: 2020-02-14 | Stop reason: SDUPTHER

## 2018-12-11 NOTE — PROGRESS NOTES
[facility] AT [address], [address]  [place], [place], [address] [phone]  [email or web address]           OUTPATIENT CLINIC FOLLOW UP NOTE    Patient Care Team:  Patient Care Team:  Santiago Kuhn MD as PCP - General (Internal Medicine)    Subjective:      Chief Complaint   Patient presents with   • Coronary Artery Disease       HPI:    Varinder Delgado is a 87 y.o. female.  History of Present Illness    The patient has a history of CAD s/p TOSHA to LAD, NSTEMI, diabetes, hypertension, hyperlipidemia, tobacco dependence. The patient presents today for follow-up.    The patient continues to do well clinically.  She denies chest pain, dyspnea, lower extremity swelling.  She does have chronic fatigue that is not significantly changed from her prior visit    Review of Systems:  Positive for fatigue  Negative for exertional chest pain, dyspnea with exertion, orthopnea, PND, lower extremity edema, palpitations, lightheadedness, syncope.    PFSH:  Patient Active Problem List   Diagnosis   • Essential hypertension   • Diabetes mellitus (CMS/HCC)   • Mixed hyperlipidemia   • Tobacco abuse   • Coronary artery disease involving native coronary artery of native heart without angina pectoris   • Chronic systolic heart failure (CMS/HCC)         Current Outpatient Medications:   •  amLODIPine (NORVASC) 10 MG tablet, Take 1 tablet by mouth Daily., Disp: 90 tablet, Rfl: 3  •  aspirin 81 MG chewable tablet, Chew 81 mg Daily., Disp: , Rfl:   •  atorvastatin (LIPITOR) 80 MG tablet, Take 1 tablet by mouth Every Night., Disp: 90 tablet, Rfl: 1  •  carvedilol (COREG) 6.25 MG tablet, Take 1 tablet by mouth Every 12 (Twelve) Hours., Disp: 180 tablet, Rfl: 1  •  clopidogrel (PLAVIX) 75 MG tablet, Take 1 tablet by mouth Daily., Disp: 90 tablet, Rfl: 1  •  furosemide (LASIX) 20 MG tablet, Take 1 tablet by mouth Daily., Disp: 30 tablet, Rfl: 11  •  glipiZIDE (GLUCOTROL) 5 MG tablet, Take 0.5  "tablets by mouth Every Morning Before Breakfast., Disp: 30 tablet, Rfl: 0  •  hydrALAZINE (APRESOLINE) 50 MG tablet, Take 1 tablet by mouth Every 8 (Eight) Hours., Disp: 270 tablet, Rfl: 3  •  isosorbide mononitrate (IMDUR) 60 MG 24 hr tablet, Take 1 tablet by mouth Daily., Disp: 30 tablet, Rfl: 3  •  metFORMIN (GLUCOPHAGE) 1000 MG tablet, Take 1 tablet by mouth 2 (Two) Times a Day With Meals. Hold x 3 days- follow up with pcp, Disp: , Rfl:   •  NOVOLOG FLEXPEN 100 UNIT/ML solution pen-injector sc pen, Inject 3 Units under the skin into the appropriate area as directed As Needed., Disp: , Rfl: 1  •  timolol (TIMOPTIC-XR) 0.5 % ophthalmic gel-forming, Administer 1 drop to both eyes 2 (Two) Times a Day., Disp: , Rfl:   •  travoprost, BAK free, (TRAVATAN Z) 0.004 % solution ophthalmic solution, Administer 1 drop to the right eye Every Night., Disp: , Rfl:     Allergies   Allergen Reactions   • Enalapril Angioedema   • Contrast Dye Swelling        reports that she quit smoking about 4 months ago. Her smoking use included cigarettes. She started smoking about 5 months ago. She has a 27.50 pack-year smoking history. she has never used smokeless tobacco.    Family History   Problem Relation Age of Onset   • Heart failure Mother    • Liver cancer Father    • Diabetes Brother    • Heart failure Brother    • Kidney disease Brother    • No Known Problems Maternal Grandmother    • No Known Problems Maternal Grandfather    • No Known Problems Paternal Grandmother    • No Known Problems Paternal Grandfather    • Pulmonary embolism Sister    • Diabetes Sister          Objective:   Blood pressure 130/66, pulse 78, height 157.5 cm (62\"), weight 61.5 kg (135 lb 9.6 oz).  CONSTITUTIONAL: No acute distress, normal affect  RESPIRATORY: Normal effort. Clear to auscultation bilaterally without wheezing or rales  CARDIOVASCULAR: Regular rate and rhythm with normal S1 and S2. Without murmur, gallop or rub.  PERIPHERAL VASCULAR: Normal " radial pulses bilaterally. There is no lower extremity edema bilaterally.    Labs:    BUN   Date Value Ref Range Status   08/03/2018 19 9 - 23 mg/dL Final     Creatinine   Date Value Ref Range Status   08/03/2018 1.30 0.60 - 1.30 mg/dL Final     Potassium   Date Value Ref Range Status   08/03/2018 4.6 3.5 - 5.5 mmol/L Final     ALT (SGPT)   Date Value Ref Range Status   07/25/2018 20 7 - 40 U/L Final     AST (SGOT)   Date Value Ref Range Status   07/25/2018 49 (H) 0 - 33 U/L Final       Lab Results   Component Value Date    CHOL 210 (H) 07/28/2018    CHOL 237 (H) 07/26/2018     Lab Results   Component Value Date    TRIG 63 07/28/2018    TRIG 179 (H) 07/26/2018     Lab Results   Component Value Date    HDL 48 07/28/2018    HDL 40 07/26/2018     No components found for: LDLCALC  No results found for: VLDL  No results found for: LDLHDL    Diagnostic Data:    Procedures    TTE 10/2018  · This was a limited echocardiogram to assess left ventricular ejection fraction.  · Left ventricular systolic function is normal. Estimated EF = 60%.  · Left ventricular wall thickness is consistent with moderate concentric hypertrophy.    TTE 7/2018  · Left ventricular systolic function is moderately decreased. Estimated EF appears to be in the range of 31 - 35%.  · Left ventricular wall thickness is consistent with mild-to-moderate concentric hypertrophy.  · There is left ventricular global hypokinesis noted.  · Left ventricular diastolic dysfunction (grade I a) consistent with impaired relaxation.  · Mild mitral valve regurgitation is present  · There is a moderate (1-2cm) pericardial effusion adjacent to the right ventricle and adjacent to the right atrium.    [facility] 7/2018  · There was severe multivessel coronary artery disease involving the LAD and circumflex arteries.  · There is an 80% discrete proximal to mid LAD stenosis that is now status post intervention with a Xience Jolanta 2.5 x 38 mm drug-eluting stent with proximal  optimization with a 3.0 mm in diameter noncompliant balloon to high atmosphere pressure.  · There is an 80% discrete apical artery stenosis that may be amenable to future balloon angioplasty if the patient has recurrent progressive symptoms.  · There is an 80% discrete stenosis in the distal AV groove artery just before the takeoff of the left PDA.  The segment may be amenable to future balloon angioplasty of the patient has recurrent progressive symptoms.  · There is an intermediate stenosis of the proximal first obtuse marginal branch as well as severe distal segment disease.  · There is an 80% ostial right common iliac artery stenosis with a peak to peak pullback gradient from the distal aorta of 20 mmHg.    Assessment and Plan:   Varinder was seen today for congestive heart failure.    Diagnoses and all orders for this visit:    Coronary artery disease involving native coronary artery of native heart without angina pectoris  -Asymptomatic.  -Continue DAPT, statin, and beta blocker.    Chronic systolic heart failure (CMS/HCC)  -Currently euvolemic. NYHA I, EF has improved back to 60%  -Continue current related medications  -Discontinue Lasix    Essential hypertension  -At goal.  -Continue amlodipine, carvedilol, hydralazine.    Mixed hyperlipidemia  -Continue atorvastatin.    Tobacco Dependence  -Have discussed Chantix in the past.  Patient declined at that time    - Return in about 6 months (around 6/17/2019).    Sreekanth De La Rosa MD, MSc, [facility]  Interventional Cardiology  [facility] at [facility]

## 2018-12-17 ENCOUNTER — OFFICE VISIT (OUTPATIENT)
Dept: CARDIOLOGY | Facility: CLINIC | Age: 83
End: 2018-12-17

## 2018-12-17 VITALS
BODY MASS INDEX: 24.95 KG/M2 | SYSTOLIC BLOOD PRESSURE: 130 MMHG | HEART RATE: 78 BPM | WEIGHT: 135.6 LBS | HEIGHT: 62 IN | DIASTOLIC BLOOD PRESSURE: 66 MMHG

## 2018-12-17 DIAGNOSIS — I25.10 CORONARY ARTERY DISEASE INVOLVING NATIVE CORONARY ARTERY OF NATIVE HEART WITHOUT ANGINA PECTORIS: Primary | ICD-10-CM

## 2018-12-17 DIAGNOSIS — E78.2 MIXED HYPERLIPIDEMIA: ICD-10-CM

## 2018-12-17 DIAGNOSIS — I50.22 CHRONIC SYSTOLIC HEART FAILURE (HCC): ICD-10-CM

## 2018-12-17 DIAGNOSIS — I10 ESSENTIAL HYPERTENSION: ICD-10-CM

## 2018-12-17 PROBLEM — I21.4 NON-ST ELEVATED MYOCARDIAL INFARCTION (NON-STEMI): Status: RESOLVED | Noted: 2018-07-26 | Resolved: 2018-12-17

## 2018-12-17 PROBLEM — I21.4 NSTEMI (NON-ST ELEVATED MYOCARDIAL INFARCTION) (HCC): Status: RESOLVED | Noted: 2018-07-25 | Resolved: 2018-12-17

## 2018-12-17 PROBLEM — I25.5 ISCHEMIC CARDIOMYOPATHY: Status: RESOLVED | Noted: 2018-08-03 | Resolved: 2018-12-17

## 2018-12-17 PROCEDURE — 99214 OFFICE O/P EST MOD 30 MIN: CPT | Performed by: INTERNAL MEDICINE

## 2018-12-18 RX ORDER — ISOSORBIDE MONONITRATE 60 MG/1
60 TABLET, EXTENDED RELEASE ORAL
Qty: 90 TABLET | Refills: 3 | Status: SHIPPED | OUTPATIENT
Start: 2018-12-18 | End: 2021-01-01 | Stop reason: SDUPTHER

## 2018-12-18 NOTE — TELEPHONE ENCOUNTER
Last seen on 10/3/18; requires 90-day refill for isosorbide mn 60mg daily. This was sent to [facility] on [address].    Juan Pablo LearyD

## 2019-05-16 DIAGNOSIS — I21.4 NON-ST ELEVATED MYOCARDIAL INFARCTION (NON-STEMI) (HCC): ICD-10-CM

## 2019-05-16 DIAGNOSIS — E78.2 MIXED HYPERLIPIDEMIA: ICD-10-CM

## 2019-05-16 RX ORDER — CLOPIDOGREL BISULFATE 75 MG/1
75 TABLET ORAL DAILY
Qty: 90 TABLET | Refills: 0 | Status: SHIPPED | OUTPATIENT
Start: 2019-05-16 | End: 2019-07-15

## 2019-05-16 RX ORDER — ATORVASTATIN CALCIUM 80 MG/1
80 TABLET, FILM COATED ORAL
Qty: 90 TABLET | Refills: 0 | Status: SHIPPED | OUTPATIENT
Start: 2019-05-16 | End: 2019-07-15

## 2019-05-16 NOTE — TELEPHONE ENCOUNTER
Last seen on 10/3/19 with no further follow up in the [facility]. Sent a refill for clopidogrel and atorvastatin with instructions to obtain further refills from Dr. De La Rosa who she will see on 6/24/19.    Juan Pablo LearyD

## 2019-06-10 RX ORDER — CARVEDILOL 6.25 MG/1
6.25 TABLET ORAL EVERY 12 HOURS
Qty: 180 TABLET | Refills: 0 | Status: SHIPPED | OUTPATIENT
Start: 2019-06-10 | End: 2019-09-13 | Stop reason: SDUPTHER

## 2019-06-10 NOTE — TELEPHONE ENCOUNTER
Last seen on 10/3/19 with no further follow up in the [facility]. Sent a refill for carvedilol 6.25mg twice daily with instructions to obtain further refills from Dr. De La Rosa who she will see on 6/24/19.     Radha Chester, Juan PabloD

## 2019-07-15 ENCOUNTER — OFFICE VISIT (OUTPATIENT)
Dept: CARDIOLOGY | Facility: CLINIC | Age: 84
End: 2019-07-15

## 2019-07-15 VITALS
SYSTOLIC BLOOD PRESSURE: 154 MMHG | BODY MASS INDEX: 25.21 KG/M2 | WEIGHT: 137 LBS | DIASTOLIC BLOOD PRESSURE: 82 MMHG | HEART RATE: 80 BPM | HEIGHT: 62 IN

## 2019-07-15 DIAGNOSIS — I25.10 CORONARY ARTERY DISEASE INVOLVING NATIVE CORONARY ARTERY OF NATIVE HEART WITHOUT ANGINA PECTORIS: Primary | ICD-10-CM

## 2019-07-15 DIAGNOSIS — F17.200 TOBACCO DEPENDENCE: ICD-10-CM

## 2019-07-15 DIAGNOSIS — I10 ESSENTIAL HYPERTENSION: ICD-10-CM

## 2019-07-15 DIAGNOSIS — E78.2 MIXED HYPERLIPIDEMIA: ICD-10-CM

## 2019-07-15 DIAGNOSIS — I50.22 CHRONIC SYSTOLIC HEART FAILURE (HCC): ICD-10-CM

## 2019-07-15 PROCEDURE — 99214 OFFICE O/P EST MOD 30 MIN: CPT | Performed by: INTERNAL MEDICINE

## 2019-07-15 NOTE — PROGRESS NOTES
[facility] [address], [address]  [place], [place], [address] [phone]  WWW[facility]           OUTPATIENT CLINIC FOLLOW UP NOTE    Patient Care Team:  Patient Care Team:  Santiago Kuhn MD as PCP - General (Internal Medicine)  Santiago Kuhn MD as PCP - Claims Attributed    Subjective:      Chief Complaint   Patient presents with   • Coronary Artery Disease       HPI:    Varinder Delgado is a 87 y.o. female.  The patient has a history of CAD s/p TOSHA to LAD, NSTEMI, diabetes, hypertension, hyperlipidemia, tobacco dependence. The patient presents today for follow-up.    Since patient was last seen she reports that she has been doing well from a cardiac standpoint.  She denies any chest pain, shortness of breath, lower extremity edema, lightheadedness, syncope, or palpitations.  She does note that she stopped taking her statin due to myalgias.  Not been checking her blood pressure at home, but reports that it is been fine at her recent primary care visit.  She also reports that she has almost stopped smoking.  Does continue to feel fatigued, but this remains unchanged.    Review of Systems:  Positive for fatigue  Negative for exertional chest pain, dyspnea with exertion, orthopnea, PND, lower extremity edema, palpitations, lightheadedness, syncope.    PFSH:  Patient Active Problem List   Diagnosis   • Essential hypertension   • Diabetes mellitus (CMS/HCC)   • Mixed hyperlipidemia   • Tobacco abuse   • Coronary artery disease involving native coronary artery of native heart without angina pectoris   • Chronic systolic heart failure (CMS/HCC)         Current Outpatient Medications:   •  amLODIPine (NORVASC) 10 MG tablet, Take 1 tablet by mouth Daily., Disp: 90 tablet, Rfl: 3  •  aspirin 81 MG chewable tablet, Chew 81 mg Daily., Disp: , Rfl:   •  atorvastatin (LIPITOR) 80 MG tablet, Take 1 tablet by mouth every night at bedtime. Obtain further refills at  appointment with Dr. De La Rosa on 6/24/19, Disp: 90 tablet, Rfl: 0  •  carvedilol (COREG) 6.25 MG tablet, Take 1 tablet by mouth Every 12 (Twelve) Hours. Obtain further refills from Dr. DeL a Rosa at appointment on 6/24/19, Disp: 180 tablet, Rfl: 0  •  clopidogrel (PLAVIX) 75 MG tablet, Take 1 tablet by mouth Daily. Obtain further refills at appointment with Dr. De La Rosa on 6/24/19, Disp: 90 tablet, Rfl: 0  •  furosemide (LASIX) 20 MG tablet, Take 1 tablet by mouth Daily., Disp: 30 tablet, Rfl: 11  •  glipiZIDE (GLUCOTROL) 5 MG tablet, Take 0.5 tablets by mouth Every Morning Before Breakfast., Disp: 30 tablet, Rfl: 0  •  hydrALAZINE (APRESOLINE) 50 MG tablet, Take 1 tablet by mouth Every 8 (Eight) Hours., Disp: 270 tablet, Rfl: 3  •  isosorbide mononitrate (IMDUR) 60 MG 24 hr tablet, Take 1 tablet by mouth Daily., Disp: 90 tablet, Rfl: 3  •  metFORMIN (GLUCOPHAGE) 1000 MG tablet, Take 1 tablet by mouth 2 (Two) Times a Day With Meals. Hold x 3 days- follow up with pcp, Disp: , Rfl:   •  NOVOLOG FLEXPEN 100 UNIT/ML solution pen-injector sc pen, Inject 3 Units under the skin into the appropriate area as directed As Needed., Disp: , Rfl: 1  •  timolol (TIMOPTIC-XR) 0.5 % ophthalmic gel-forming, Administer 1 drop to both eyes 2 (Two) Times a Day., Disp: , Rfl:   •  travoprost, BAK free, (TRAVATAN Z) 0.004 % solution ophthalmic solution, Administer 1 drop to the right eye Every Night., Disp: , Rfl:     Allergies   Allergen Reactions   • Enalapril Angioedema   • Contrast Dye Swelling        reports that she quit smoking about a year ago. Her smoking use included cigarettes. She started smoking about a year ago. She has a 27.50 pack-year smoking history. She has never used smokeless tobacco.    Family History   Problem Relation Age of Onset   • Heart failure Mother    • Liver cancer Father    • Diabetes Brother    • Heart failure Brother    • Kidney disease Brother    • No Known Problems Maternal Grandmother    • No Known Problems  "Maternal Grandfather    • No Known Problems Paternal Grandmother    • No Known Problems Paternal Grandfather    • Pulmonary embolism Sister    • Diabetes Sister          Objective:   Blood pressure 154/82, pulse 80, height 157.5 cm (62\"), weight 62.1 kg (137 lb).  CONSTITUTIONAL: No acute distress, normal affect  RESPIRATORY: Normal effort. Clear to auscultation bilaterally without wheezing or rales  CARDIOVASCULAR: Regular rate and rhythm with normal S1 and S2. Without murmur, gallop or rub.  PERIPHERAL VASCULAR: Normal radial pulses bilaterally. There is no lower extremity edema bilaterally.    Labs:    BUN   Date Value Ref Range Status   08/03/2018 19 9 - 23 mg/dL Final     Creatinine   Date Value Ref Range Status   08/03/2018 1.30 0.60 - 1.30 mg/dL Final     Potassium   Date Value Ref Range Status   08/03/2018 4.6 3.5 - 5.5 mmol/L Final     ALT (SGPT)   Date Value Ref Range Status   07/25/2018 20 7 - 40 U/L Final     AST (SGOT)   Date Value Ref Range Status   07/25/2018 49 (H) 0 - 33 U/L Final       Lab Results   Component Value Date    CHOL 210 (H) 07/28/2018    CHOL 237 (H) 07/26/2018     Lab Results   Component Value Date    TRIG 63 07/28/2018    TRIG 179 (H) 07/26/2018     Lab Results   Component Value Date    HDL 48 07/28/2018    HDL 40 07/26/2018     No components found for: LDLCALC  No results found for: VLDL  No results found for: LDLHDL    Outside labs 7/2019  Hemoglobin 12.5  Hematocrit 38.2  Platelets 334  BUN 21  Creatinine 1.12  Sodium 141  Potassium 4.1  AST 22  ALT 18  HDL 60  Triglycerides 147  Total cholesterol 182  LDL 93    Diagnostic Data:    Procedures    TTE 10/2018  · This was a limited echocardiogram to assess left ventricular ejection fraction.  · Left ventricular systolic function is normal. Estimated EF = 60%.  · Left ventricular wall thickness is consistent with moderate concentric hypertrophy.    TTE 7/2018  · Left ventricular systolic function is moderately decreased. Estimated EF " appears to be in the range of 31 - 35%.  · Left ventricular wall thickness is consistent with mild-to-moderate concentric hypertrophy.  · There is left ventricular global hypokinesis noted.  · Left ventricular diastolic dysfunction (grade I a) consistent with impaired relaxation.  · Mild mitral valve regurgitation is present  · There is a moderate (1-2cm) pericardial effusion adjacent to the right ventricle and adjacent to the right atrium.    [facility] [place] 7/2018  · There was severe multivessel coronary artery disease involving the LAD and circumflex arteries.  · There is an 80% discrete proximal to mid LAD stenosis that is now status post intervention with a Xience Jolanta 2.5 x 38 mm drug-eluting stent with proximal optimization with a 3.0 mm in diameter noncompliant balloon to high atmosphere pressure.  · There is an 80% discrete apical artery stenosis that may be amenable to future balloon angioplasty if the patient has recurrent progressive symptoms.  · There is an 80% discrete stenosis in the distal AV groove artery just before the takeoff of the left PDA.  The segment may be amenable to future balloon angioplasty of the patient has recurrent progressive symptoms.  · There is an intermediate stenosis of the proximal first obtuse marginal branch as well as severe distal segment disease.  · There is an 80% ostial right common iliac artery stenosis with a peak to peak pullback gradient from the distal aorta of 20 mmHg.    Assessment and Plan:   Varinder was seen today for congestive heart failure.    Diagnoses and all orders for this visit:    Coronary artery disease involving native coronary artery of native heart without angina pectoris  -CCS 0  -Continue ASA and beta blocker.  -Discontinue atorvastatin due to myalgias.  Patient has reportedly tried 4-5 other statin medications in the past with myalgias as well.  -Begin Livalo 2 mg p.o. nightly.  -Discontinue Plavix at this time    Chronic systolic heart failure  (CMS/[facility])  -Currently euvolemic. NYHA I, EF has improved back to 60%  -Continue current related medications    Essential hypertension  -Elevated this visit.  -Continue amlodipine, carvedilol, hydralazine.  Blood pressure remains elevated would increase Coreg.    Mixed hyperlipidemia  -Discontinue atorvastatin.  -Begin Livalo 2 mg p.o. nightly.    Tobacco Dependence  -Did not tolerate Chantix.  Reports smoking very little at this point.    - Return in about 6 months (around 1/15/2020).    Scribed for Sreekanth De La Rosa MD by Alondra Perez, MING   7/15/2019  1:27 PM    I, Sreekanth De La Rosa MD, personally performed the services as scribed by the above named individual. I have made any necessary edits and it is both accurate and complete.     Sreekanth De La Rosa MD, MSc, [facility]  Interventional Cardiology  [facility] at [facility]

## 2019-08-14 DIAGNOSIS — E78.2 MIXED HYPERLIPIDEMIA: ICD-10-CM

## 2019-08-14 DIAGNOSIS — I21.4 NON-ST ELEVATED MYOCARDIAL INFARCTION (NON-STEMI) (HCC): ICD-10-CM

## 2019-08-14 RX ORDER — CLOPIDOGREL BISULFATE 75 MG/1
75 TABLET ORAL DAILY
Qty: 90 TABLET | Refills: 0 | OUTPATIENT
Start: 2019-08-14

## 2019-08-14 RX ORDER — ATORVASTATIN CALCIUM 80 MG/1
TABLET, FILM COATED ORAL
Qty: 90 TABLET | Refills: 0 | OUTPATIENT
Start: 2019-08-14

## 2019-08-14 NOTE — TELEPHONE ENCOUNTER
Patient saw Dr. De La Rosa. Per Dr. De La Rosa's note d/c atorvastatin and plavix. Refill requests denied.     Santiago James, Juan PabloD

## 2019-09-13 RX ORDER — CARVEDILOL 6.25 MG/1
TABLET ORAL
Qty: 180 TABLET | Refills: 0 | OUTPATIENT
Start: 2019-09-13

## 2019-09-13 RX ORDER — CARVEDILOL 6.25 MG/1
6.25 TABLET ORAL EVERY 12 HOURS
Qty: 180 TABLET | Refills: 0 | Status: SHIPPED | OUTPATIENT
Start: 2019-09-13 | End: 2019-12-16 | Stop reason: SDUPTHER

## 2019-09-13 NOTE — TELEPHONE ENCOUNTER
Patient last seen on 10/3/18 with no further follow [facility]. Denied refills for carvedilol today and routed to Dr. De La Rosa's office so they can send in refill if indicated.

## 2019-12-16 RX ORDER — CARVEDILOL 6.25 MG/1
TABLET ORAL
Qty: 180 TABLET | Refills: 0 | Status: ON HOLD | OUTPATIENT
Start: 2019-12-16 | End: 2020-02-14 | Stop reason: SDUPTHER

## 2019-12-23 RX ORDER — AMLODIPINE BESYLATE 10 MG/1
TABLET ORAL
Qty: 90 TABLET | Refills: 3 | OUTPATIENT
Start: 2019-12-23

## 2019-12-23 RX ORDER — FUROSEMIDE 20 MG/1
TABLET ORAL
Qty: 90 TABLET | Refills: 3 | OUTPATIENT
Start: 2019-12-23

## 2019-12-23 RX ORDER — ISOSORBIDE MONONITRATE 60 MG/1
TABLET, EXTENDED RELEASE ORAL
Qty: 90 TABLET | Refills: 3 | OUTPATIENT
Start: 2019-12-23

## 2019-12-23 NOTE — TELEPHONE ENCOUNTER
Patient last seen on 10/3/18 with no further follow up in [facility]. Denied refills for amlodipine and isosorbide today and routed to Dr. De La Rosa's office so they can send in refill if indicated.

## 2020-02-03 ENCOUNTER — OFFICE VISIT (OUTPATIENT)
Dept: CARDIOLOGY | Facility: CLINIC | Age: 85
End: 2020-02-03

## 2020-02-03 ENCOUNTER — LAB (OUTPATIENT)
Dept: LAB | Facility: HOSPITAL | Age: 85
End: 2020-02-03

## 2020-02-03 VITALS
DIASTOLIC BLOOD PRESSURE: 72 MMHG | WEIGHT: 140.4 LBS | HEART RATE: 73 BPM | HEIGHT: 60 IN | SYSTOLIC BLOOD PRESSURE: 134 MMHG | BODY MASS INDEX: 27.56 KG/M2 | OXYGEN SATURATION: 96 %

## 2020-02-03 DIAGNOSIS — R60.0 LOWER EXTREMITY EDEMA: ICD-10-CM

## 2020-02-03 DIAGNOSIS — I10 ESSENTIAL HYPERTENSION: ICD-10-CM

## 2020-02-03 DIAGNOSIS — E78.2 MIXED HYPERLIPIDEMIA: ICD-10-CM

## 2020-02-03 DIAGNOSIS — Z72.0 TOBACCO ABUSE: ICD-10-CM

## 2020-02-03 DIAGNOSIS — I50.22 CHRONIC SYSTOLIC HEART FAILURE (HCC): ICD-10-CM

## 2020-02-03 DIAGNOSIS — E87.70 HYPERVOLEMIA, UNSPECIFIED HYPERVOLEMIA TYPE: ICD-10-CM

## 2020-02-03 DIAGNOSIS — R09.89 RIGHT CAROTID BRUIT: ICD-10-CM

## 2020-02-03 DIAGNOSIS — I73.9 CLAUDICATION (HCC): ICD-10-CM

## 2020-02-03 DIAGNOSIS — I25.10 CORONARY ARTERY DISEASE INVOLVING NATIVE CORONARY ARTERY OF NATIVE HEART WITHOUT ANGINA PECTORIS: ICD-10-CM

## 2020-02-03 DIAGNOSIS — I25.10 CORONARY ARTERY DISEASE INVOLVING NATIVE CORONARY ARTERY OF NATIVE HEART WITHOUT ANGINA PECTORIS: Primary | ICD-10-CM

## 2020-02-03 LAB
ALBUMIN SERPL-MCNC: 4.3 G/DL (ref 3.5–5.2)
ALBUMIN/GLOB SERPL: 1.2 G/DL
ALP SERPL-CCNC: 134 U/L (ref 39–117)
ALT SERPL W P-5'-P-CCNC: 14 U/L (ref 1–33)
ANION GAP SERPL CALCULATED.3IONS-SCNC: 17.1 MMOL/L (ref 5–15)
ARTICHOKE IGE QN: 155 MG/DL (ref 0–100)
AST SERPL-CCNC: 16 U/L (ref 1–32)
BASOPHILS # BLD AUTO: 0.05 10*3/MM3 (ref 0–0.2)
BASOPHILS NFR BLD AUTO: 0.5 % (ref 0–1.5)
BILIRUB SERPL-MCNC: 0.3 MG/DL (ref 0.2–1.2)
BUN BLD-MCNC: 33 MG/DL (ref 8–23)
BUN/CREAT SERPL: 19.9 (ref 7–25)
CALCIUM SPEC-SCNC: 10 MG/DL (ref 8.6–10.5)
CHLORIDE SERPL-SCNC: 93 MMOL/L (ref 98–107)
CO2 SERPL-SCNC: 21.9 MMOL/L (ref 22–29)
CREAT BLD-MCNC: 1.66 MG/DL (ref 0.57–1)
DEPRECATED RDW RBC AUTO: 41.5 FL (ref 37–54)
EOSINOPHIL # BLD AUTO: 0.2 10*3/MM3 (ref 0–0.4)
EOSINOPHIL NFR BLD AUTO: 2.2 % (ref 0.3–6.2)
ERYTHROCYTE [DISTWIDTH] IN BLOOD BY AUTOMATED COUNT: 13.2 % (ref 12.3–15.4)
GFR SERPL CREATININE-BSD FRML MDRD: 35 ML/MIN/1.73
GLOBULIN UR ELPH-MCNC: 3.7 GM/DL
GLUCOSE BLD-MCNC: 548 MG/DL (ref 65–99)
HCT VFR BLD AUTO: 43.4 % (ref 34–46.6)
HGB BLD-MCNC: 14.2 G/DL (ref 12–15.9)
IMM GRANULOCYTES # BLD AUTO: 0.03 10*3/MM3 (ref 0–0.05)
IMM GRANULOCYTES NFR BLD AUTO: 0.3 % (ref 0–0.5)
LYMPHOCYTES # BLD AUTO: 2.33 10*3/MM3 (ref 0.7–3.1)
LYMPHOCYTES NFR BLD AUTO: 25.1 % (ref 19.6–45.3)
MCH RBC QN AUTO: 28.5 PG (ref 26.6–33)
MCHC RBC AUTO-ENTMCNC: 32.7 G/DL (ref 31.5–35.7)
MCV RBC AUTO: 87 FL (ref 79–97)
MONOCYTES # BLD AUTO: 0.49 10*3/MM3 (ref 0.1–0.9)
MONOCYTES NFR BLD AUTO: 5.3 % (ref 5–12)
NEUTROPHILS # BLD AUTO: 6.18 10*3/MM3 (ref 1.7–7)
NEUTROPHILS NFR BLD AUTO: 66.6 % (ref 42.7–76)
NRBC BLD AUTO-RTO: 0 /100 WBC (ref 0–0.2)
PLATELET # BLD AUTO: 342 10*3/MM3 (ref 140–450)
PMV BLD AUTO: 11.5 FL (ref 6–12)
POTASSIUM BLD-SCNC: 5.5 MMOL/L (ref 3.5–5.2)
PROT SERPL-MCNC: 8 G/DL (ref 6–8.5)
RBC # BLD AUTO: 4.99 10*6/MM3 (ref 3.77–5.28)
SODIUM BLD-SCNC: 132 MMOL/L (ref 136–145)
TSH SERPL DL<=0.05 MIU/L-ACNC: 1.76 UIU/ML (ref 0.27–4.2)
WBC NRBC COR # BLD: 9.28 10*3/MM3 (ref 3.4–10.8)

## 2020-02-03 PROCEDURE — 83880 ASSAY OF NATRIURETIC PEPTIDE: CPT

## 2020-02-03 PROCEDURE — 36415 COLL VENOUS BLD VENIPUNCTURE: CPT

## 2020-02-03 PROCEDURE — 99214 OFFICE O/P EST MOD 30 MIN: CPT | Performed by: INTERNAL MEDICINE

## 2020-02-03 PROCEDURE — 80053 COMPREHEN METABOLIC PANEL: CPT

## 2020-02-03 PROCEDURE — 85025 COMPLETE CBC W/AUTO DIFF WBC: CPT

## 2020-02-03 PROCEDURE — 83721 ASSAY OF BLOOD LIPOPROTEIN: CPT

## 2020-02-03 PROCEDURE — 84443 ASSAY THYROID STIM HORMONE: CPT

## 2020-02-03 RX ORDER — ACETAMINOPHEN 500 MG
500 TABLET ORAL EVERY 6 HOURS PRN
COMMUNITY

## 2020-02-03 NOTE — PROGRESS NOTES
[facility] AT [address], [address]  [place], [place], [address] [phone]  [facility]           OUTPATIENT CLINIC FOLLOW UP NOTE    Patient Care Team:  Patient Care Team:  Santiago Kuhn MD as PCP - General (Internal Medicine)    Subjective:      Chief Complaint   Patient presents with   • Coronary Artery Disease       HPI:    Varinder Delgado is a 88 y.o. female.  The patient has a history of CAD, history of NSTEMI 7/2018 with TOSHA to LAD, diabetes, hypertension, hyperlipidemia, tobacco dependence.     The patient presents today for follow-up.    Since patient was last seen she reports that she has been doing well from a cardiac standpoint.  She denies any chest pain, shortness of breath, lightheadedness, syncope, or palpitations.      She has noticed over the last week or 2 lower extremity swelling that is worse in the mornings and improves with lifting her legs up or raising them at night.  She has noted pitting.  She has some bilateral hip to knee pain, possibly worsened with exertion    She is still using cigarettes every other day    Review of Systems:  Positive for fatigue, lower extremity swelling, possible claudication  Negative for exertional chest pain, dyspnea with exertion, orthopnea, PND, palpitations, lightheadedness, syncope.    PFSH:  Patient Active Problem List   Diagnosis   • Essential hypertension   • Diabetes mellitus (CMS/HCC)   • Mixed hyperlipidemia   • Tobacco abuse   • Coronary artery disease involving native coronary artery of native heart without angina pectoris   • Chronic systolic heart failure (CMS/HCC)   • Claudication (CMS/HCC)   • Lower extremity edema         Current Outpatient Medications:   •  acetaminophen (TYLENOL) 500 MG tablet, Take 500 mg by mouth Every 6 (Six) Hours As Needed for Mild Pain ., Disp: , Rfl:   •  amLODIPine (NORVASC) 10 MG tablet, Take 1 tablet by mouth Daily., Disp: 90 tablet, Rfl: 3  •  aspirin 81 MG  "chewable tablet, Chew 81 mg Daily., Disp: , Rfl:   •  carvedilol (COREG) 6.25 MG tablet, PLEASE SEE ATTACHED FOR DETAILED DIRECTIONS, Disp: 180 tablet, Rfl: 0  •  glipiZIDE (GLUCOTROL) 5 MG tablet, Take 0.5 tablets by mouth Every Morning Before Breakfast., Disp: 30 tablet, Rfl: 0  •  hydrALAZINE (APRESOLINE) 50 MG tablet, Take 1 tablet by mouth Every 8 (Eight) Hours., Disp: 270 tablet, Rfl: 3  •  isosorbide mononitrate (IMDUR) 60 MG 24 hr tablet, Take 1 tablet by mouth Daily., Disp: 90 tablet, Rfl: 3  •  NOVOLOG FLEXPEN 100 UNIT/ML solution pen-injector sc pen, Inject 3 Units under the skin into the appropriate area as directed As Needed., Disp: , Rfl: 1  •  pitavastatin calcium (LIVALO) 2 MG tablet tablet, Take 1 tablet by mouth Every Night., Disp: 30 tablet, Rfl: 11  •  timolol (TIMOPTIC-XR) 0.5 % ophthalmic gel-forming, Administer 1 drop to both eyes 2 (Two) Times a Day., Disp: , Rfl:   •  travoprost, BAK free, (TRAVATAN Z) 0.004 % solution ophthalmic solution, Administer 1 drop to the right eye Every Night., Disp: , Rfl:     Allergies   Allergen Reactions   • Contrast Dye Swelling   • Enalapril Angioedema        reports that she quit smoking about 18 months ago. Her smoking use included cigarettes. She started smoking about 18 months ago. She has a 27.50 pack-year smoking history. She has never used smokeless tobacco.    Family History   Problem Relation Age of Onset   • Heart failure Mother    • Liver cancer Father    • Diabetes Brother    • Heart failure Brother    • Kidney disease Brother    • No Known Problems Maternal Grandmother    • No Known Problems Maternal Grandfather    • No Known Problems Paternal Grandmother    • No Known Problems Paternal Grandfather    • Pulmonary embolism Sister    • Diabetes Sister          Objective:   Blood pressure 134/72, pulse 73, height 152.4 cm (60\"), weight 63.7 kg (140 lb 6.4 oz), SpO2 96 %.  CONSTITUTIONAL: No acute distress, normal affect  RESPIRATORY: Normal effort. " Clear to auscultation bilaterally without wheezing or rales  CARDIOVASCULAR: Right carotid bruit.  Regular rate and rhythm with normal S1 and S2. Without murmur, gallop or rub.  PERIPHERAL VASCULAR: Normal radial pulse on the left. There is mild 1-2+ pitting edema edema bilaterally.    2/3/2020 exam: Nonpalpable pedal pulses bilaterally    Labs:    BUN   Date Value Ref Range Status   08/03/2018 19 9 - 23 mg/dL Final     Creatinine   Date Value Ref Range Status   08/03/2018 1.30 0.60 - 1.30 mg/dL Final     Potassium   Date Value Ref Range Status   08/03/2018 4.6 3.5 - 5.5 mmol/L Final     ALT (SGPT)   Date Value Ref Range Status   07/25/2018 20 7 - 40 U/L Final     AST (SGOT)   Date Value Ref Range Status   07/25/2018 49 (H) 0 - 33 U/L Final       Lab Results   Component Value Date    CHOL 210 (H) 07/28/2018    CHOL 237 (H) 07/26/2018     Lab Results   Component Value Date    TRIG 63 07/28/2018    TRIG 179 (H) 07/26/2018     Lab Results   Component Value Date    HDL 48 07/28/2018    HDL 40 07/26/2018     No components found for: LDLCALC  No results found for: VLDL  No results found for: LDLHDL    Outside labs 7/2019  Hemoglobin 12.5  Hematocrit 38.2  Platelets 334  BUN 21  Creatinine 1.12  Sodium 141  Potassium 4.1  AST 22  ALT 18  HDL 60  Triglycerides 147  Total cholesterol 182  LDL 93    Diagnostic Data:    Procedures    TTE 10/2018  · This was a limited echocardiogram to assess left ventricular ejection fraction.  · Left ventricular systolic function is normal. Estimated EF = 60%.  · Left ventricular wall thickness is consistent with moderate concentric hypertrophy.    TTE 7/2018  · Left ventricular systolic function is moderately decreased. Estimated EF appears to be in the range of 31 - 35%.  · Left ventricular wall thickness is consistent with mild-to-moderate concentric hypertrophy.  · There is left ventricular global hypokinesis noted.  · Left ventricular diastolic dysfunction (grade I a) consistent with  impaired relaxation.  · Mild mitral valve regurgitation is present  · There is a moderate (1-2cm) pericardial effusion adjacent to the right ventricle and adjacent to the right atrium.    [facility] 7/2018  · There was severe multivessel coronary artery disease involving the LAD and circumflex arteries.  · There is an 80% discrete proximal to mid LAD stenosis that is now status post intervention with a Xience Jolanta 2.5 x 38 mm drug-eluting stent with proximal optimization with a 3.0 mm in diameter noncompliant balloon to high atmosphere pressure.  · There is an 80% discrete apical artery stenosis that may be amenable to future balloon angioplasty if the patient has recurrent progressive symptoms.  · There is an 80% discrete stenosis in the distal AV groove artery just before the takeoff of the left PDA.  The segment may be amenable to future balloon angioplasty of the patient has recurrent progressive symptoms.  · There is an intermediate stenosis of the proximal first obtuse marginal branch as well as severe distal segment disease.  · There is an 80% ostial right common iliac artery stenosis with a peak to peak pullback gradient from the distal aorta of 20 mmHg.    Assessment and Plan:   Varinder was seen today for congestive heart failure.    Diagnoses and all orders for this visit:    Coronary artery disease involving native coronary artery of native heart without angina pectoris  Mixed hyperlipidemia  -CCS 0  -Continue ASA and beta blocker.  -Had myalgias with atorvastatin.  -Tolerating Livalo 2 mg p.o. Nightly.  -Repeat direct LDL today    Chronic systolic heart failure (CMS/HCC)  Lower extremity swelling  -NYHA I, EF has improved back to 60%  -Continue current related medications  -Unclear cause of her lower extremity swelling, will check a CMP, CBC, proBNP, TSH    Possible claudication  -Exercise ABIs    Essential hypertension  -Continue current medications    Tobacco Dependence  -Did not tolerate Chantix.  Reports  smoking every other day    - Return in about 3 months (around 5/3/2020).    Sreekanth De La Rosa MD, MSc, [facility]  Interventional Cardiology  [facility] at [facility]

## 2020-02-04 DIAGNOSIS — E87.70 HYPERVOLEMIA, UNSPECIFIED HYPERVOLEMIA TYPE: Primary | ICD-10-CM

## 2020-02-05 ENCOUNTER — TELEPHONE (OUTPATIENT)
Dept: CARDIOLOGY | Facility: HOSPITAL | Age: 85
End: 2020-02-05

## 2020-02-05 ENCOUNTER — TELEPHONE (OUTPATIENT)
Dept: CARDIOLOGY | Facility: CLINIC | Age: 85
End: 2020-02-05

## 2020-02-05 DIAGNOSIS — I50.22 CHRONIC SYSTOLIC HEART FAILURE (HCC): Primary | ICD-10-CM

## 2020-02-05 DIAGNOSIS — R79.89 INCREASE IN CREATININE: ICD-10-CM

## 2020-02-05 LAB — NT-PROBNP SERPL-MCNC: 1682 PG/ML (ref 5–1800)

## 2020-02-05 NOTE — TELEPHONE ENCOUNTER
Patient contacted with recommendations per MJS. Patient to have repeat echo. Patient also advised that we will refer her to nephrologist for evaluation.       Will fax lab results to PCP. Patient states that her swelling has gone down and is not bothersome at this time.     Patient verbalizes understanding, all questions answered at this time.

## 2020-02-05 NOTE — TELEPHONE ENCOUNTER
----- Message from Sreekanth De La Rosa MD sent at 2/5/2020  1:45 PM EST -----  Can you let the patient know that her kidney numbers are up, her potassium is up, and her glucose was very high.  It does not look like the fluid is being retained primarily by her heart but rather her kidneys.  Despite this, given her history of heart failure, please have her repeat an echocardiogram.  Please refer her to nephrology.  Please forward the results to her primary care doctor.  Please ask if the swelling is tolerable, if not, have her take Lasix 40 mg just for the next 2 days and have her call us if she continues to have issues

## 2020-02-05 NOTE — TELEPHONE ENCOUNTER
Pt caretaker Manisha KELLOGG for pt needing an apt. When returning call, no answer. LVM to call me back.

## 2020-02-05 NOTE — TELEPHONE ENCOUNTER
Melissa in the lab called and said that they processed a CMP for patient on a previous order but their was another order for a BNP. She said that the order was from a couple days ago and the tech cancelled the order because it had been sent to [place] and they thought it was lost. She states that they can still process the order in [place] [facility] but it needed to have a new order. Was this ordered by you or by Dr. De La Rosa?

## 2020-02-07 ENCOUNTER — HOSPITAL ENCOUNTER (INPATIENT)
Facility: HOSPITAL | Age: 85
LOS: 6 days | Discharge: NURSING, REHAB OR LONG-TERM CARE | End: 2020-02-14
Attending: EMERGENCY MEDICINE | Admitting: INTERNAL MEDICINE

## 2020-02-07 ENCOUNTER — APPOINTMENT (OUTPATIENT)
Dept: GENERAL RADIOLOGY | Facility: HOSPITAL | Age: 85
End: 2020-02-07

## 2020-02-07 DIAGNOSIS — Z74.09 IMPAIRED MOBILITY AND ADLS: ICD-10-CM

## 2020-02-07 DIAGNOSIS — I16.0 HYPERTENSIVE URGENCY: ICD-10-CM

## 2020-02-07 DIAGNOSIS — Z78.9 IMPAIRED MOBILITY AND ADLS: ICD-10-CM

## 2020-02-07 DIAGNOSIS — E13.10 DIABETIC KETOACIDOSIS WITHOUT COMA ASSOCIATED WITH OTHER SPECIFIED DIABETES MELLITUS (HCC): Primary | ICD-10-CM

## 2020-02-07 DIAGNOSIS — N19 RENAL FAILURE, UNSPECIFIED CHRONICITY: ICD-10-CM

## 2020-02-07 LAB
ALBUMIN SERPL-MCNC: 3.8 G/DL (ref 3.5–5.2)
ALBUMIN/GLOB SERPL: 1.1 G/DL
ALP SERPL-CCNC: 101 U/L (ref 39–117)
ALT SERPL W P-5'-P-CCNC: 14 U/L (ref 1–33)
ANION GAP SERPL CALCULATED.3IONS-SCNC: 20 MMOL/L (ref 5–15)
AST SERPL-CCNC: 40 U/L (ref 1–32)
B-OH-BUTYR SERPL-SCNC: 0.87 MMOL/L (ref 0.02–0.27)
BASOPHILS # BLD AUTO: 0.02 10*3/MM3 (ref 0–0.2)
BASOPHILS NFR BLD AUTO: 0.1 % (ref 0–1.5)
BILIRUB SERPL-MCNC: 0.3 MG/DL (ref 0.2–1.2)
BUN BLD-MCNC: 86 MG/DL (ref 8–23)
BUN/CREAT SERPL: 13.4 (ref 7–25)
CALCIUM SPEC-SCNC: 8.4 MG/DL (ref 8.6–10.5)
CHLORIDE SERPL-SCNC: 91 MMOL/L (ref 98–107)
CO2 SERPL-SCNC: 15 MMOL/L (ref 22–29)
CREAT BLD-MCNC: 6.44 MG/DL (ref 0.57–1)
DEPRECATED RDW RBC AUTO: 41.6 FL (ref 37–54)
EOSINOPHIL # BLD AUTO: 0.01 10*3/MM3 (ref 0–0.4)
EOSINOPHIL NFR BLD AUTO: 0.1 % (ref 0.3–6.2)
ERYTHROCYTE [DISTWIDTH] IN BLOOD BY AUTOMATED COUNT: 13.7 % (ref 12.3–15.4)
GFR SERPL CREATININE-BSD FRML MDRD: 7 ML/MIN/1.73
GFR SERPL CREATININE-BSD FRML MDRD: ABNORMAL ML/MIN/{1.73_M2}
GLOBULIN UR ELPH-MCNC: 3.6 GM/DL
GLUCOSE BLD-MCNC: 603 MG/DL (ref 65–99)
HBA1C MFR BLD: 12.9 % (ref 4.8–5.6)
HCT VFR BLD AUTO: 48.2 % (ref 34–46.6)
HGB BLD-MCNC: 15.9 G/DL (ref 12–15.9)
HOLD SPECIMEN: NORMAL
HOLD SPECIMEN: NORMAL
IMM GRANULOCYTES # BLD AUTO: 0.06 10*3/MM3 (ref 0–0.05)
IMM GRANULOCYTES NFR BLD AUTO: 0.4 % (ref 0–0.5)
LYMPHOCYTES # BLD AUTO: 1.64 10*3/MM3 (ref 0.7–3.1)
LYMPHOCYTES NFR BLD AUTO: 10.8 % (ref 19.6–45.3)
MAGNESIUM SERPL-MCNC: 2.4 MG/DL (ref 1.6–2.4)
MCH RBC QN AUTO: 27.7 PG (ref 26.6–33)
MCHC RBC AUTO-ENTMCNC: 33 G/DL (ref 31.5–35.7)
MCV RBC AUTO: 83.8 FL (ref 79–97)
MONOCYTES # BLD AUTO: 0.57 10*3/MM3 (ref 0.1–0.9)
MONOCYTES NFR BLD AUTO: 3.7 % (ref 5–12)
NEUTROPHILS # BLD AUTO: 12.93 10*3/MM3 (ref 1.7–7)
NEUTROPHILS NFR BLD AUTO: 84.9 % (ref 42.7–76)
NRBC BLD AUTO-RTO: 0 /100 WBC (ref 0–0.2)
PHOSPHATE SERPL-MCNC: 6.7 MG/DL (ref 2.5–4.5)
PLATELET # BLD AUTO: 332 10*3/MM3 (ref 140–450)
PMV BLD AUTO: 10.8 FL (ref 6–12)
POTASSIUM BLD-SCNC: 6.9 MMOL/L (ref 3.5–5.2)
PROT SERPL-MCNC: 7.4 G/DL (ref 6–8.5)
RBC # BLD AUTO: 5.75 10*6/MM3 (ref 3.77–5.28)
SODIUM BLD-SCNC: 126 MMOL/L (ref 136–145)
TROPONIN T SERPL-MCNC: 0.03 NG/ML (ref 0–0.03)
WBC NRBC COR # BLD: 15.23 10*3/MM3 (ref 3.4–10.8)
WHOLE BLOOD HOLD SPECIMEN: NORMAL
WHOLE BLOOD HOLD SPECIMEN: NORMAL

## 2020-02-07 PROCEDURE — 82010 KETONE BODYS QUAN: CPT | Performed by: EMERGENCY MEDICINE

## 2020-02-07 PROCEDURE — 84100 ASSAY OF PHOSPHORUS: CPT | Performed by: EMERGENCY MEDICINE

## 2020-02-07 PROCEDURE — 84443 ASSAY THYROID STIM HORMONE: CPT | Performed by: INTERNAL MEDICINE

## 2020-02-07 PROCEDURE — 93005 ELECTROCARDIOGRAM TRACING: CPT | Performed by: EMERGENCY MEDICINE

## 2020-02-07 PROCEDURE — 85025 COMPLETE CBC W/AUTO DIFF WBC: CPT | Performed by: EMERGENCY MEDICINE

## 2020-02-07 PROCEDURE — 84484 ASSAY OF TROPONIN QUANT: CPT | Performed by: EMERGENCY MEDICINE

## 2020-02-07 PROCEDURE — 80053 COMPREHEN METABOLIC PANEL: CPT | Performed by: EMERGENCY MEDICINE

## 2020-02-07 PROCEDURE — 71045 X-RAY EXAM CHEST 1 VIEW: CPT

## 2020-02-07 PROCEDURE — 83735 ASSAY OF MAGNESIUM: CPT | Performed by: EMERGENCY MEDICINE

## 2020-02-07 PROCEDURE — 99284 EMERGENCY DEPT VISIT MOD MDM: CPT

## 2020-02-07 PROCEDURE — 83930 ASSAY OF BLOOD OSMOLALITY: CPT | Performed by: EMERGENCY MEDICINE

## 2020-02-07 PROCEDURE — 83036 HEMOGLOBIN GLYCOSYLATED A1C: CPT | Performed by: EMERGENCY MEDICINE

## 2020-02-07 RX ORDER — DEXTROSE AND SODIUM CHLORIDE 5; .45 G/100ML; G/100ML
150 INJECTION, SOLUTION INTRAVENOUS CONTINUOUS PRN
Status: DISCONTINUED | OUTPATIENT
Start: 2020-02-07 | End: 2020-02-08

## 2020-02-07 RX ORDER — DEXTROSE, SODIUM CHLORIDE, AND POTASSIUM CHLORIDE 5; .45; .15 G/100ML; G/100ML; G/100ML
150 INJECTION INTRAVENOUS CONTINUOUS PRN
Status: DISCONTINUED | OUTPATIENT
Start: 2020-02-07 | End: 2020-02-08

## 2020-02-07 RX ORDER — DEXTROSE, SODIUM CHLORIDE, AND POTASSIUM CHLORIDE 5; .45; .3 G/100ML; G/100ML; G/100ML
150 INJECTION INTRAVENOUS CONTINUOUS PRN
Status: DISCONTINUED | OUTPATIENT
Start: 2020-02-07 | End: 2020-02-08

## 2020-02-07 RX ORDER — DEXTROSE MONOHYDRATE 25 G/50ML
12.5 INJECTION, SOLUTION INTRAVENOUS AS NEEDED
Status: DISCONTINUED | OUTPATIENT
Start: 2020-02-07 | End: 2020-02-09

## 2020-02-07 RX ORDER — SODIUM CHLORIDE 9 MG/ML
250 INJECTION, SOLUTION INTRAVENOUS CONTINUOUS PRN
Status: DISCONTINUED | OUTPATIENT
Start: 2020-02-07 | End: 2020-02-08

## 2020-02-07 RX ORDER — SODIUM CHLORIDE 0.9 % (FLUSH) 0.9 %
3 SYRINGE (ML) INJECTION EVERY 12 HOURS SCHEDULED
Status: DISCONTINUED | OUTPATIENT
Start: 2020-02-07 | End: 2020-02-09

## 2020-02-07 RX ORDER — METOPROLOL TARTRATE 5 MG/5ML
5 INJECTION INTRAVENOUS ONCE
Status: COMPLETED | OUTPATIENT
Start: 2020-02-07 | End: 2020-02-07

## 2020-02-07 RX ORDER — DEXTROSE, SODIUM CHLORIDE, AND POTASSIUM CHLORIDE 5; .9; .15 G/100ML; G/100ML; G/100ML
150 INJECTION INTRAVENOUS CONTINUOUS PRN
Status: DISCONTINUED | OUTPATIENT
Start: 2020-02-07 | End: 2020-02-08

## 2020-02-07 RX ORDER — SODIUM CHLORIDE 9 MG/ML
10 INJECTION, SOLUTION INTRAVENOUS CONTINUOUS PRN
Status: DISCONTINUED | OUTPATIENT
Start: 2020-02-07 | End: 2020-02-09

## 2020-02-07 RX ORDER — SODIUM CHLORIDE AND POTASSIUM CHLORIDE 300; 900 MG/100ML; MG/100ML
250 INJECTION, SOLUTION INTRAVENOUS CONTINUOUS PRN
Status: DISCONTINUED | OUTPATIENT
Start: 2020-02-07 | End: 2020-02-08

## 2020-02-07 RX ORDER — SODIUM CHLORIDE 0.9 % (FLUSH) 0.9 %
10 SYRINGE (ML) INJECTION AS NEEDED
Status: DISCONTINUED | OUTPATIENT
Start: 2020-02-07 | End: 2020-02-14 | Stop reason: HOSPADM

## 2020-02-07 RX ORDER — SODIUM CHLORIDE AND POTASSIUM CHLORIDE 150; 450 MG/100ML; MG/100ML
250 INJECTION, SOLUTION INTRAVENOUS CONTINUOUS PRN
Status: DISCONTINUED | OUTPATIENT
Start: 2020-02-07 | End: 2020-02-08

## 2020-02-07 RX ORDER — SODIUM CHLORIDE 450 MG/100ML
250 INJECTION, SOLUTION INTRAVENOUS CONTINUOUS PRN
Status: DISCONTINUED | OUTPATIENT
Start: 2020-02-07 | End: 2020-02-08

## 2020-02-07 RX ORDER — SODIUM CHLORIDE AND POTASSIUM CHLORIDE 150; 900 MG/100ML; MG/100ML
250 INJECTION, SOLUTION INTRAVENOUS CONTINUOUS PRN
Status: DISCONTINUED | OUTPATIENT
Start: 2020-02-07 | End: 2020-02-08

## 2020-02-07 RX ORDER — SODIUM CHLORIDE 0.9 % (FLUSH) 0.9 %
10 SYRINGE (ML) INJECTION AS NEEDED
Status: DISCONTINUED | OUTPATIENT
Start: 2020-02-07 | End: 2020-02-09

## 2020-02-07 RX ORDER — SODIUM CHLORIDE 0.9 % (FLUSH) 0.9 %
10 SYRINGE (ML) INJECTION ONCE AS NEEDED
Status: DISCONTINUED | OUTPATIENT
Start: 2020-02-07 | End: 2020-02-09

## 2020-02-07 RX ORDER — DEXTROSE AND SODIUM CHLORIDE 5; .9 G/100ML; G/100ML
150 INJECTION, SOLUTION INTRAVENOUS CONTINUOUS PRN
Status: DISCONTINUED | OUTPATIENT
Start: 2020-02-07 | End: 2020-02-08

## 2020-02-07 RX ADMIN — SODIUM CHLORIDE 1000 ML: 9 INJECTION, SOLUTION INTRAVENOUS at 21:25

## 2020-02-07 RX ADMIN — METOPROLOL TARTRATE 5 MG: 1 INJECTION, SOLUTION INTRAVENOUS at 22:46

## 2020-02-08 ENCOUNTER — APPOINTMENT (OUTPATIENT)
Dept: ULTRASOUND IMAGING | Facility: HOSPITAL | Age: 85
End: 2020-02-08

## 2020-02-08 ENCOUNTER — APPOINTMENT (OUTPATIENT)
Dept: CARDIOLOGY | Facility: HOSPITAL | Age: 85
End: 2020-02-08

## 2020-02-08 PROBLEM — Z79.4 TYPE 2 DIABETES MELLITUS WITH DIABETIC NEPHROPATHY, WITH LONG-TERM CURRENT USE OF INSULIN (HCC): Status: ACTIVE | Noted: 2020-02-08

## 2020-02-08 PROBLEM — E11.10 DIABETIC ACIDOSIS WITHOUT COMA: Status: ACTIVE | Noted: 2020-02-08

## 2020-02-08 PROBLEM — E87.1 HYPONATREMIA: Status: ACTIVE | Noted: 2020-02-08

## 2020-02-08 PROBLEM — H40.9 GLAUCOMA: Status: ACTIVE | Noted: 2020-02-08

## 2020-02-08 PROBLEM — R53.81 DEBILITY: Status: ACTIVE | Noted: 2020-02-08

## 2020-02-08 PROBLEM — I50.22 CHRONIC SYSTOLIC HEART FAILURE (HCC): Status: RESOLVED | Noted: 2018-09-10 | Resolved: 2020-02-08

## 2020-02-08 PROBLEM — E11.21 TYPE 2 DIABETES MELLITUS WITH DIABETIC NEPHROPATHY, WITH LONG-TERM CURRENT USE OF INSULIN (HCC): Status: ACTIVE | Noted: 2020-02-08

## 2020-02-08 PROBLEM — I10 ESSENTIAL HYPERTENSION: Status: ACTIVE | Noted: 2020-02-08

## 2020-02-08 PROBLEM — E87.29 METABOLIC ACIDOSIS, INCREASED ANION GAP: Status: ACTIVE | Noted: 2020-02-08

## 2020-02-08 PROBLEM — N17.9 ACUTE RENAL FAILURE (ARF) (HCC): Status: ACTIVE | Noted: 2020-02-08

## 2020-02-08 PROBLEM — R19.7 DIARRHEA OF PRESUMED INFECTIOUS ORIGIN: Status: ACTIVE | Noted: 2020-02-08

## 2020-02-08 LAB
ALBUMIN SERPL-MCNC: 3.3 G/DL (ref 3.5–5.2)
ALBUMIN/GLOB SERPL: 1 G/DL
ALP SERPL-CCNC: 103 U/L (ref 39–117)
ALT SERPL W P-5'-P-CCNC: 9 U/L (ref 1–33)
ANION GAP SERPL CALCULATED.3IONS-SCNC: 14 MMOL/L (ref 5–15)
ANION GAP SERPL CALCULATED.3IONS-SCNC: 18 MMOL/L (ref 5–15)
ANION GAP SERPL CALCULATED.3IONS-SCNC: 19 MMOL/L (ref 5–15)
ANION GAP SERPL CALCULATED.3IONS-SCNC: 19 MMOL/L (ref 5–15)
ANION GAP SERPL CALCULATED.3IONS-SCNC: 21 MMOL/L (ref 5–15)
ARTERIAL PATENCY WRIST A: ABNORMAL
ARTERIAL PATENCY WRIST A: ABNORMAL
AST SERPL-CCNC: 16 U/L (ref 1–32)
ATMOSPHERIC PRESS: ABNORMAL MM[HG]
ATMOSPHERIC PRESS: ABNORMAL MM[HG]
BACTERIA UR QL AUTO: ABNORMAL /HPF
BASE EXCESS BLDA CALC-SCNC: -10 MMOL/L (ref 0–2)
BASE EXCESS BLDA CALC-SCNC: -3.9 MMOL/L (ref 0–2)
BDY SITE: ABNORMAL
BDY SITE: ABNORMAL
BILIRUB SERPL-MCNC: 0.3 MG/DL (ref 0.2–1.2)
BILIRUB UR QL STRIP: NEGATIVE
BODY TEMPERATURE: 37 C
BODY TEMPERATURE: 37 C
BUN BLD-MCNC: 78 MG/DL (ref 8–23)
BUN BLD-MCNC: 80 MG/DL (ref 8–23)
BUN BLD-MCNC: 83 MG/DL (ref 8–23)
BUN/CREAT SERPL: 13.3 (ref 7–25)
BUN/CREAT SERPL: 13.5 (ref 7–25)
BUN/CREAT SERPL: 13.5 (ref 7–25)
BUN/CREAT SERPL: 13.9 (ref 7–25)
BUN/CREAT SERPL: 14.6 (ref 7–25)
CALCIUM SPEC-SCNC: 7.2 MG/DL (ref 8.6–10.5)
CALCIUM SPEC-SCNC: 7.4 MG/DL (ref 8.6–10.5)
CALCIUM SPEC-SCNC: 7.4 MG/DL (ref 8.6–10.5)
CALCIUM SPEC-SCNC: 7.6 MG/DL (ref 8.6–10.5)
CALCIUM SPEC-SCNC: 7.8 MG/DL (ref 8.6–10.5)
CHLORIDE SERPL-SCNC: 104 MMOL/L (ref 98–107)
CHLORIDE SERPL-SCNC: 91 MMOL/L (ref 98–107)
CHLORIDE SERPL-SCNC: 97 MMOL/L (ref 98–107)
CHLORIDE SERPL-SCNC: 99 MMOL/L (ref 98–107)
CHLORIDE SERPL-SCNC: 99 MMOL/L (ref 98–107)
CLARITY UR: ABNORMAL
CO2 BLDA-SCNC: 15.8 MMOL/L (ref 22–33)
CO2 BLDA-SCNC: 20.7 MMOL/L (ref 22–33)
CO2 SERPL-SCNC: 12 MMOL/L (ref 22–29)
CO2 SERPL-SCNC: 13 MMOL/L (ref 22–29)
CO2 SERPL-SCNC: 14 MMOL/L (ref 22–29)
COHGB MFR BLD: 0.7 % (ref 0–2)
COHGB MFR BLD: 0.9 % (ref 0–2)
COLOR UR: YELLOW
CREAT BLD-MCNC: 5.35 MG/DL (ref 0.57–1)
CREAT BLD-MCNC: 5.61 MG/DL (ref 0.57–1)
CREAT BLD-MCNC: 5.77 MG/DL (ref 0.57–1)
CREAT BLD-MCNC: 5.93 MG/DL (ref 0.57–1)
CREAT BLD-MCNC: 6.25 MG/DL (ref 0.57–1)
CREAT UR-MCNC: 283.6 MG/DL
EOSINOPHIL SPEC QL MICRO: 0 % EOS/100 CELLS (ref 0–0)
EPAP: 0
EPAP: 0
GFR SERPL CREATININE-BSD FRML MDRD: 8 ML/MIN/1.73
GFR SERPL CREATININE-BSD FRML MDRD: 9 ML/MIN/1.73
GFR SERPL CREATININE-BSD FRML MDRD: 9 ML/MIN/1.73
GFR SERPL CREATININE-BSD FRML MDRD: ABNORMAL ML/MIN/{1.73_M2}
GLOBULIN UR ELPH-MCNC: 3.3 GM/DL
GLUCOSE BLD-MCNC: 135 MG/DL (ref 65–99)
GLUCOSE BLD-MCNC: 183 MG/DL (ref 65–99)
GLUCOSE BLD-MCNC: 227 MG/DL (ref 65–99)
GLUCOSE BLD-MCNC: 303 MG/DL (ref 65–99)
GLUCOSE BLD-MCNC: 522 MG/DL (ref 65–99)
GLUCOSE BLDC GLUCOMTR-MCNC: 104 MG/DL (ref 70–130)
GLUCOSE BLDC GLUCOMTR-MCNC: 114 MG/DL (ref 70–130)
GLUCOSE BLDC GLUCOMTR-MCNC: 133 MG/DL (ref 70–130)
GLUCOSE BLDC GLUCOMTR-MCNC: 136 MG/DL (ref 70–130)
GLUCOSE BLDC GLUCOMTR-MCNC: 138 MG/DL (ref 70–130)
GLUCOSE BLDC GLUCOMTR-MCNC: 151 MG/DL (ref 70–130)
GLUCOSE BLDC GLUCOMTR-MCNC: 159 MG/DL (ref 70–130)
GLUCOSE BLDC GLUCOMTR-MCNC: 161 MG/DL (ref 70–130)
GLUCOSE BLDC GLUCOMTR-MCNC: 172 MG/DL (ref 70–130)
GLUCOSE BLDC GLUCOMTR-MCNC: 173 MG/DL (ref 70–130)
GLUCOSE BLDC GLUCOMTR-MCNC: 178 MG/DL (ref 70–130)
GLUCOSE BLDC GLUCOMTR-MCNC: 188 MG/DL (ref 70–130)
GLUCOSE BLDC GLUCOMTR-MCNC: 207 MG/DL (ref 70–130)
GLUCOSE BLDC GLUCOMTR-MCNC: 210 MG/DL (ref 70–130)
GLUCOSE BLDC GLUCOMTR-MCNC: 217 MG/DL (ref 70–130)
GLUCOSE BLDC GLUCOMTR-MCNC: 236 MG/DL (ref 70–130)
GLUCOSE BLDC GLUCOMTR-MCNC: 300 MG/DL (ref 70–130)
GLUCOSE BLDC GLUCOMTR-MCNC: 342 MG/DL (ref 70–130)
GLUCOSE BLDC GLUCOMTR-MCNC: 352 MG/DL (ref 70–130)
GLUCOSE BLDC GLUCOMTR-MCNC: 390 MG/DL (ref 70–130)
GLUCOSE BLDC GLUCOMTR-MCNC: 90 MG/DL (ref 70–130)
GLUCOSE UR STRIP-MCNC: ABNORMAL MG/DL
HCO3 BLDA-SCNC: 14.9 MMOL/L (ref 20–26)
HCO3 BLDA-SCNC: 19.7 MMOL/L (ref 20–26)
HCT VFR BLD CALC: 39.1 %
HCT VFR BLD CALC: 45.7 %
HGB BLDA-MCNC: 12.8 G/DL (ref 14–18)
HGB BLDA-MCNC: 14.9 G/DL (ref 14–18)
HGB UR QL STRIP.AUTO: NEGATIVE
HOROWITZ INDEX BLD+IHG-RTO: 21 %
HOROWITZ INDEX BLD+IHG-RTO: 21 %
HYALINE CASTS UR QL AUTO: ABNORMAL /LPF
IPAP: 0
IPAP: 0
KETONES UR QL STRIP: ABNORMAL
LEUKOCYTE ESTERASE UR QL STRIP.AUTO: NEGATIVE
METHGB BLD QL: 0.8 % (ref 0–1.5)
METHGB BLD QL: 0.9 % (ref 0–1.5)
MODALITY: ABNORMAL
MODALITY: ABNORMAL
NITRITE UR QL STRIP: NEGATIVE
NOTE: ABNORMAL
NOTE: ABNORMAL
OSMOLALITY SERPL: 330 MOSM/KG (ref 275–295)
OXYHGB MFR BLDV: 95.5 % (ref 94–99)
OXYHGB MFR BLDV: 95.5 % (ref 94–99)
PAW @ PEAK INSP FLOW SETTING VENT: 0 CMH2O
PAW @ PEAK INSP FLOW SETTING VENT: 0 CMH2O
PCO2 BLDA: 30 MM HG (ref 35–45)
PCO2 BLDA: 31.1 MM HG (ref 35–45)
PCO2 TEMP ADJ BLD: 30 MM HG (ref 35–45)
PCO2 TEMP ADJ BLD: 31.1 MM HG (ref 35–45)
PH BLDA: 7.3 PH UNITS (ref 7.35–7.45)
PH BLDA: 7.41 PH UNITS (ref 7.35–7.45)
PH UR STRIP.AUTO: <=5 [PH] (ref 5–8)
PH, TEMP CORRECTED: 7.3 PH UNITS
PH, TEMP CORRECTED: 7.41 PH UNITS
PO2 BLDA: 86.8 MM HG (ref 83–108)
PO2 BLDA: 96.6 MM HG (ref 83–108)
PO2 TEMP ADJ BLD: 86.8 MM HG (ref 83–108)
PO2 TEMP ADJ BLD: 96.6 MM HG (ref 83–108)
POTASSIUM BLD-SCNC: 4.7 MMOL/L (ref 3.5–5.2)
POTASSIUM BLD-SCNC: 4.8 MMOL/L (ref 3.5–5.2)
POTASSIUM BLD-SCNC: 4.8 MMOL/L (ref 3.5–5.2)
POTASSIUM BLD-SCNC: 5.2 MMOL/L (ref 3.5–5.2)
POTASSIUM BLD-SCNC: 5.8 MMOL/L (ref 3.5–5.2)
PROT SERPL-MCNC: 6.6 G/DL (ref 6–8.5)
PROT UR QL STRIP: ABNORMAL
PROT UR-MCNC: 128.6 MG/DL
RBC # UR: ABNORMAL /HPF
REF LAB TEST METHOD: ABNORMAL
RENAL EPI CELLS #/AREA URNS HPF: ABNORMAL /HPF
SODIUM BLD-SCNC: 125 MMOL/L (ref 136–145)
SODIUM BLD-SCNC: 128 MMOL/L (ref 136–145)
SODIUM BLD-SCNC: 131 MMOL/L (ref 136–145)
SODIUM BLD-SCNC: 132 MMOL/L (ref 136–145)
SODIUM BLD-SCNC: 132 MMOL/L (ref 136–145)
SP GR UR STRIP: 1.03 (ref 1–1.03)
SQUAMOUS #/AREA URNS HPF: ABNORMAL /HPF
TOTAL RATE: 0 BREATHS/MINUTE
TOTAL RATE: 0 BREATHS/MINUTE
TSH SERPL DL<=0.05 MIU/L-ACNC: 2.54 UIU/ML (ref 0.27–4.2)
UROBILINOGEN UR QL STRIP: ABNORMAL
WBC UR QL AUTO: ABNORMAL /HPF

## 2020-02-08 PROCEDURE — 25010000002 HEPARIN (PORCINE) PER 1000 UNITS: Performed by: INTERNAL MEDICINE

## 2020-02-08 PROCEDURE — 82962 GLUCOSE BLOOD TEST: CPT

## 2020-02-08 PROCEDURE — 82570 ASSAY OF URINE CREATININE: CPT | Performed by: INTERNAL MEDICINE

## 2020-02-08 PROCEDURE — 84156 ASSAY OF PROTEIN URINE: CPT | Performed by: INTERNAL MEDICINE

## 2020-02-08 PROCEDURE — 80048 BASIC METABOLIC PNL TOTAL CA: CPT | Performed by: INTERNAL MEDICINE

## 2020-02-08 PROCEDURE — 87086 URINE CULTURE/COLONY COUNT: CPT | Performed by: INTERNAL MEDICINE

## 2020-02-08 PROCEDURE — 82805 BLOOD GASES W/O2 SATURATION: CPT

## 2020-02-08 PROCEDURE — 81001 URINALYSIS AUTO W/SCOPE: CPT | Performed by: INTERNAL MEDICINE

## 2020-02-08 PROCEDURE — 86256 FLUORESCENT ANTIBODY TITER: CPT | Performed by: INTERNAL MEDICINE

## 2020-02-08 PROCEDURE — 36600 WITHDRAWAL OF ARTERIAL BLOOD: CPT

## 2020-02-08 PROCEDURE — 93975 VASCULAR STUDY: CPT

## 2020-02-08 PROCEDURE — 87205 SMEAR GRAM STAIN: CPT | Performed by: INTERNAL MEDICINE

## 2020-02-08 PROCEDURE — 83516 IMMUNOASSAY NONANTIBODY: CPT | Performed by: INTERNAL MEDICINE

## 2020-02-08 PROCEDURE — 99222 1ST HOSP IP/OBS MODERATE 55: CPT | Performed by: INTERNAL MEDICINE

## 2020-02-08 PROCEDURE — 76775 US EXAM ABDO BACK WALL LIM: CPT

## 2020-02-08 PROCEDURE — 86038 ANTINUCLEAR ANTIBODIES: CPT | Performed by: INTERNAL MEDICINE

## 2020-02-08 PROCEDURE — 83520 IMMUNOASSAY QUANT NOS NONAB: CPT | Performed by: INTERNAL MEDICINE

## 2020-02-08 PROCEDURE — 83883 ASSAY NEPHELOMETRY NOT SPEC: CPT | Performed by: INTERNAL MEDICINE

## 2020-02-08 RX ORDER — ONDANSETRON 2 MG/ML
4 INJECTION INTRAMUSCULAR; INTRAVENOUS EVERY 6 HOURS PRN
Status: DISCONTINUED | OUTPATIENT
Start: 2020-02-08 | End: 2020-02-14 | Stop reason: HOSPADM

## 2020-02-08 RX ORDER — ASPIRIN 81 MG/1
81 TABLET, CHEWABLE ORAL DAILY
Status: DISCONTINUED | OUTPATIENT
Start: 2020-02-08 | End: 2020-02-14 | Stop reason: HOSPADM

## 2020-02-08 RX ORDER — TIMOLOL MALEATE 5 MG/ML
1 SOLUTION/ DROPS OPHTHALMIC EVERY 12 HOURS SCHEDULED
Status: DISCONTINUED | OUTPATIENT
Start: 2020-02-08 | End: 2020-02-14 | Stop reason: HOSPADM

## 2020-02-08 RX ORDER — CARVEDILOL 6.25 MG/1
6.25 TABLET ORAL 2 TIMES DAILY WITH MEALS
Status: DISCONTINUED | OUTPATIENT
Start: 2020-02-08 | End: 2020-02-10

## 2020-02-08 RX ORDER — HEPARIN SODIUM 5000 [USP'U]/ML
5000 INJECTION, SOLUTION INTRAVENOUS; SUBCUTANEOUS EVERY 12 HOURS SCHEDULED
Status: DISCONTINUED | OUTPATIENT
Start: 2020-02-08 | End: 2020-02-14 | Stop reason: HOSPADM

## 2020-02-08 RX ORDER — ACETAMINOPHEN 650 MG
TABLET, EXTENDED RELEASE ORAL DAILY
Status: DISCONTINUED | OUTPATIENT
Start: 2020-02-08 | End: 2020-02-14 | Stop reason: HOSPADM

## 2020-02-08 RX ORDER — NYSTATIN 100000 [USP'U]/G
POWDER TOPICAL EVERY 12 HOURS SCHEDULED
Status: DISCONTINUED | OUTPATIENT
Start: 2020-02-08 | End: 2020-02-14 | Stop reason: HOSPADM

## 2020-02-08 RX ORDER — HYDRALAZINE HYDROCHLORIDE 50 MG/1
50 TABLET, FILM COATED ORAL EVERY 8 HOURS SCHEDULED
Status: DISCONTINUED | OUTPATIENT
Start: 2020-02-08 | End: 2020-02-08

## 2020-02-08 RX ORDER — ACETAMINOPHEN 500 MG
500 TABLET ORAL EVERY 6 HOURS PRN
Status: DISCONTINUED | OUTPATIENT
Start: 2020-02-08 | End: 2020-02-08

## 2020-02-08 RX ORDER — ISOSORBIDE MONONITRATE 60 MG/1
60 TABLET, EXTENDED RELEASE ORAL
Status: DISCONTINUED | OUTPATIENT
Start: 2020-02-08 | End: 2020-02-14 | Stop reason: HOSPADM

## 2020-02-08 RX ORDER — ACETAMINOPHEN 325 MG/1
650 TABLET ORAL EVERY 6 HOURS PRN
Status: DISCONTINUED | OUTPATIENT
Start: 2020-02-08 | End: 2020-02-14 | Stop reason: HOSPADM

## 2020-02-08 RX ORDER — DEXTROSE AND SODIUM CHLORIDE 5; .45 G/100ML; G/100ML
125 INJECTION, SOLUTION INTRAVENOUS CONTINUOUS
Status: DISCONTINUED | OUTPATIENT
Start: 2020-02-08 | End: 2020-02-08

## 2020-02-08 RX ORDER — AMLODIPINE BESYLATE 10 MG/1
10 TABLET ORAL DAILY
Status: DISCONTINUED | OUTPATIENT
Start: 2020-02-08 | End: 2020-02-14 | Stop reason: HOSPADM

## 2020-02-08 RX ORDER — LATANOPROST 50 UG/ML
1 SOLUTION/ DROPS OPHTHALMIC NIGHTLY
Status: DISCONTINUED | OUTPATIENT
Start: 2020-02-08 | End: 2020-02-14 | Stop reason: HOSPADM

## 2020-02-08 RX ORDER — DEXTROSE AND SODIUM CHLORIDE 5; .45 G/100ML; G/100ML
50 INJECTION, SOLUTION INTRAVENOUS CONTINUOUS
Status: DISCONTINUED | OUTPATIENT
Start: 2020-02-08 | End: 2020-02-09

## 2020-02-08 RX ADMIN — CARVEDILOL 6.25 MG: 6.25 TABLET, FILM COATED ORAL at 17:21

## 2020-02-08 RX ADMIN — INSULIN HUMAN 1 UNITS/HR: 1 INJECTION, SOLUTION INTRAVENOUS at 09:54

## 2020-02-08 RX ADMIN — AMLODIPINE BESYLATE 10 MG: 10 TABLET ORAL at 08:45

## 2020-02-08 RX ADMIN — DEXTROSE AND SODIUM CHLORIDE 100 ML/HR: 5; 450 INJECTION, SOLUTION INTRAVENOUS at 21:19

## 2020-02-08 RX ADMIN — ISOSORBIDE MONONITRATE 60 MG: 60 TABLET, EXTENDED RELEASE ORAL at 08:45

## 2020-02-08 RX ADMIN — SODIUM CHLORIDE 5 MG/HR: 9 INJECTION, SOLUTION INTRAVENOUS at 02:05

## 2020-02-08 RX ADMIN — DEXTROSE AND SODIUM CHLORIDE 150 ML/HR: 5; 450 INJECTION, SOLUTION INTRAVENOUS at 09:55

## 2020-02-08 RX ADMIN — NYSTATIN: 100000 POWDER TOPICAL at 08:45

## 2020-02-08 RX ADMIN — LATANOPROST 1 DROP: 50 SOLUTION OPHTHALMIC at 21:19

## 2020-02-08 RX ADMIN — DEXTROSE AND SODIUM CHLORIDE 125 ML/HR: 5; 450 INJECTION, SOLUTION INTRAVENOUS at 05:49

## 2020-02-08 RX ADMIN — SODIUM CHLORIDE 2000 ML: 9 INJECTION, SOLUTION INTRAVENOUS at 01:10

## 2020-02-08 RX ADMIN — SODIUM CHLORIDE, PRESERVATIVE FREE: 5 INJECTION INTRAVENOUS at 21:20

## 2020-02-08 RX ADMIN — SODIUM NITROPRUSSIDE 0.3 MCG/KG/MIN: 25 INJECTION, SOLUTION, CONCENTRATE INTRAVENOUS at 00:39

## 2020-02-08 RX ADMIN — ASPIRIN 81 MG CHEWABLE TABLET 81 MG: 81 TABLET CHEWABLE at 08:45

## 2020-02-08 RX ADMIN — TIMOLOL MALEATE 1 DROP: 5 SOLUTION/ DROPS OPHTHALMIC at 21:19

## 2020-02-08 RX ADMIN — SODIUM BICARBONATE 150 MEQ: 84 INJECTION, SOLUTION INTRAVENOUS at 12:28

## 2020-02-08 RX ADMIN — HEPARIN SODIUM 5000 UNITS: 5000 INJECTION, SOLUTION INTRAVENOUS; SUBCUTANEOUS at 08:45

## 2020-02-08 RX ADMIN — HEPARIN SODIUM 5000 UNITS: 5000 INJECTION, SOLUTION INTRAVENOUS; SUBCUTANEOUS at 21:19

## 2020-02-08 RX ADMIN — NYSTATIN 1 APPLICATION: 100000 POWDER TOPICAL at 21:19

## 2020-02-08 RX ADMIN — SODIUM CHLORIDE, PRESERVATIVE FREE 3 ML: 5 INJECTION INTRAVENOUS at 08:46

## 2020-02-08 RX ADMIN — TIMOLOL MALEATE 1 DROP: 5 SOLUTION/ DROPS OPHTHALMIC at 08:45

## 2020-02-08 RX ADMIN — CARVEDILOL 6.25 MG: 6.25 TABLET, FILM COATED ORAL at 05:51

## 2020-02-08 RX ADMIN — INSULIN HUMAN 6 UNITS/HR: 1 INJECTION, SOLUTION INTRAVENOUS at 00:38

## 2020-02-08 RX ADMIN — DEXTROSE MONOHYDRATE 12.5 G: 25 INJECTION, SOLUTION INTRAVENOUS at 08:58

## 2020-02-08 RX ADMIN — HYDRALAZINE HYDROCHLORIDE 50 MG: 50 TABLET, FILM COATED ORAL at 05:51

## 2020-02-08 NOTE — CONSULTS
"Clinical Nutrition   Reason For Visit: Admission assessment, Physician consult, MST score 2+, Unintentional weight loss    Patient Name: Varinder Delgado  YOB: 1931  MRN: [id number]  Date of Encounter: 02/08/20 12:02 PM  Admission date: 2/7/2020      -RD will offer Boost GC to patient if she has poor PO intake when PO diet initiated.  -RD will offer T2DM nutrition education to both patient and son prior to discharge.      Nutrition Assessment     Admission Problem List:  Diarrhea (resolved)  N/V  DKA  Dehydration  Accelerated HTN  SALOMÓN      Applicable PMH:  HTN, HLD  CAD  T2DM on insulin      Applicable medical tests/procedures since admission:      Reported/Observed/Food/Nutrition Related History   Patient and niece present during visit. Patient having catheter placed during visit, so RD obtained information from niece at bedside. Niece reports that patient currently lives with son and that patient has been eating noticeably less \"for a while now\", unable to provide specific timeframe. Also reports that when patient does eat, she eats unhealthy foods and soda. Reports patient has not been taking her insulin recently. Had purchased some Glucerna for patient to drink at home, but unsure if patient has consumed any. Unsure if patient has recently had any unintentional weight loss, but suspects she has. Denies food allergies and difficulty chewing/swallowing. States that patient's son will be present later but that she is available to answer questions or help if needed - phone number in patient's emergency contact section of chart. RD informed niece that RD will offer Boost GC if patient with poor appetite once patient's blood glucose levels are better controlled. Patient NPO at this time with insulin drip.    Anthropometrics   Height: 62 in  Weight: 140 lbs (MDOV 2/3 per EMR)  BMI: 25.6  BMI classification: Overweight: 25.0-29.9kg/m2    IBW: 110 lbs    UBW: niece unsure - will ask patient when " "able  Weight change: RD unable to determine at this time - insufficient data      Labs reviewed   Labs reviewed: Yes    Medications reviewed   Medications reviewed: Yes  GTT: insulin    Current Nutrition Prescription   PO: NPO Diet    Evaluation of Received Nutrient/Fluid Intake: insufficient data    Nutrition Diagnosis     Problem Inadequate oral intake   Etiology Decreased appetite; diet order   Signs/Symptoms Patient's niece reports that patient has been eating noticeably less \"for a while now\"; patient NPO at this time       Problem Food and nutrition knowledge deficit   Etiology Non-compliance versus lack of education (to be determined)   Signs/Symptoms HgbA1c = 12.9 (2/7/20)       Intervention   Intervention: Follow treatment progress, Care plan reviewed, Await begin PO     -RD will offer Boost GC to patient if she has poor PO intake when PO diet initiated.  -RD will offer T2DM nutrition education to both patient and son prior to discharge.    Goal:   General: Nutrition support treatment  PO: Initiate diet as medically appropriate     Monitoring/Evaluation:       Monitoring/Evaluation: Per protocol, I&O, Pertinent labs, Weight  Will Continue to follow per protocol  Christina Mahoney RD  Time Spent: 35 min  "

## 2020-02-08 NOTE — PLAN OF CARE
VSS. Off Cardene gtt. Renal US and Duplex today. Nephrology made recs for IVFs. Pt. Remains on Insulin gtt at 1 unit/hr. F/C placed. Minimal urine output today. Sample sent for testing.

## 2020-02-08 NOTE — PLAN OF CARE
Problem: Patient Care Overview  Goal: Plan of Care Review  Outcome: Ongoing (interventions implemented as appropriate)  Flowsheets (Taken 2/8/2020 0516)  Progress: no change  Plan of Care Reviewed With: patient  Outcome Summary: Pt admitted with Hypertensive crisis, DKA. Transferred to floor on cardene drip and insulin drip. Cardene was stopped on arrival. Slowing lowering BP, maintained 160-180 Systolicaly. Insulin drip at 4u/hr. Monitoring q1 acuchecks, and consulting with hospitilist group for management due to patients kidney function. Pt alert and oriented. Denied any pain, discomfort. NSR. RA. Plan of care discussed with pt. Verbalized understanding. Will continue to monitor.  Goal: Individualization and Mutuality  Outcome: Ongoing (interventions implemented as appropriate)  Goal: Discharge Needs Assessment  Outcome: Ongoing (interventions implemented as appropriate)  Goal: Interprofessional Rounds/Family Conf  Outcome: Ongoing (interventions implemented as appropriate)     Problem: Fall Risk (Adult)  Goal: Identify Related Risk Factors and Signs and Symptoms  Outcome: Ongoing (interventions implemented as appropriate)  Flowsheets (Taken 2/8/2020 0516)  Related Risk Factors (Fall Risk): age-related changes; fatigue/slow reaction; environment unfamiliar  Signs and Symptoms (Fall Risk): presence of risk factors  Goal: Absence of Fall  Outcome: Ongoing (interventions implemented as appropriate)  Flowsheets (Taken 2/8/2020 0516)  Absence of Fall: making progress toward outcome     Problem: Skin Injury Risk (Adult)  Goal: Identify Related Risk Factors and Signs and Symptoms  Outcome: Ongoing (interventions implemented as appropriate)  Flowsheets (Taken 2/8/2020 0516)  Related Risk Factors (Skin Injury Risk): advanced age; medication; nutritional deficiencies  Goal: Skin Health and Integrity  Outcome: Ongoing (interventions implemented as appropriate)  Flowsheets (Taken 2/8/2020 0516)  Skin Health and Integrity:  making progress toward outcome     Problem: Diabetes, Type 2 (Adult)  Goal: Signs and Symptoms of Listed Potential Problems Will be Absent, Minimized or Managed (Diabetes, Type 2)  Outcome: Ongoing (interventions implemented as appropriate)  Flowsheets (Taken 2/8/2020 0516)  Problems Assessed (Type 2 Diabetes): all  Problems Present (Type 2 Diabetes): DKA (diabetic ketoacidosis)/HHS (hyperosmolar hyperglycemic state); hyperglycemia; situational response     Problem: Hypertensive Disease/Crisis (Arterial) (Adult)  Goal: Signs and Symptoms of Listed Potential Problems Will be Absent, Minimized or Managed (Hypertensive Disease/Crisis)  Outcome: Ongoing (interventions implemented as appropriate)  Flowsheets (Taken 2/8/2020 0516)  Problems Assessed (Hypertensive Disease/Crisis (Arterial)): all  Problems Present (Hypertensive Disease): renal dysfunction; severe hypertension/hypertensive crisis; situational response

## 2020-02-08 NOTE — PROGRESS NOTES
[facility] Medicine Services  ADMISSION FOLLOW-UP NOTE          Patient admitted after midnight, H&P by my partner performed earlier on today's date reviewed.  Interim findings, labs, and charting also reviewed.        The [facility] Hospital Problem List has been managed and updated to include any new diagnoses:  Active Hospital Problems    Diagnosis  POA   • Diabetic acidosis without coma (CMS/HCC) [E11.10]  Yes   • Diarrhea of presumed infectious origin [R19.7]  Yes   • Accelerated hypertension [I10]  Yes      Resolved Hospital Problems   No resolved problems to display.         ADDITIONAL PLAN:  - detailed assessment and plan form admission reviewed  -Consult nephrology for renal failure.  No urine output reported per nursing.  Plan bladder scan.  -Continue DKA treatment per protocol.  Continue frequent monitoring of BMP and consider transitioning off insulin drip once anion gap has closed and acidosis further improved.  -Urinalysis notably shows dehydration.  Plan to follow-up on culture.  Urinalysis is not definitive for UTI but could consider treating if culture becomes positive.  -Continue drip for blood pressure and restart home blood pressure medications.  -Diarrhea now resolved.  -Repeat laboratory studies tomorrow.      Electronically signed by Rubin Babcock MD, 02/08/20, 9:34 AM.

## 2020-02-08 NOTE — PROGRESS NOTES
Continued Stay Note  [facility]     Patient Name: Varinder Delgado  MRN: [id number]  Today's Date: 2/8/2020    Admit Date: 2/7/2020    Discharge Plan     Row Name 02/08/20 1518       Plan    Plan  TBD    Provided post acute provider list?  N/A    N/A Provider List Comment  Unsure of dc plans    Patient/Family in Agreement with Plan  other (see comments) Son    Plan Comments  Spoke with son at bedside. Pt. was not present and was in US for procedure. Pt. lives alone in [facility]. Has a niece that checks in on pt. Son lives in [place]. Pt. has become weak, not eating well. Son states she would be open to rehab but if not she would need to come live with him in [place]. Will need to discuss options with pt. when she is in her room. Will cont. to follow.     Final Discharge Disposition Code  30 - still a patient        Discharge Codes    No documentation.             Cinthya Rios RN

## 2020-02-08 NOTE — PROGRESS NOTES
Discharge Planning Assessment  [facility]     Patient Name: Varinder Delgado  MRN: [id number]  Today's Date: 2/8/2020    Admit Date: 2/7/2020    Discharge Needs Assessment     Row Name 02/08/20 1516       Living Environment    Lives With  alone    Current Living Arrangements  home/apartment/condo    Primary Care Provided by  self    Provides Primary Care For  no one, unable/limited ability to care for self    Family Caregiver if Needed  other relative(s)    Family Caregiver Names  Manisha Mccracken- gabbie    Quality of Family Relationships  unable to assess    Able to Return to Prior Arrangements  no       Resource/Environmental Concerns    Resource/Environmental Concerns  none    Transportation Concerns  car, none       Transition Planning    Patient/Family Anticipates Transition to  home with family;inpatient rehabilitation facility    Patient/Family Anticipated Services at Transition      Transportation Anticipated  family or friend will provide       Discharge Needs Assessment    Concerns to be Addressed  discharge planning    Equipment Currently Used at Home  none    Anticipated Changes Related to Illness  inability to care for self    Equipment Needed After Discharge  walker, rolling    Outpatient/Agency/Support Group Needs  homecare agency;skilled nursing facility    Discharge Facility/Level of Care Needs  home with home health;nursing facility, skilled    Current Discharge Risk  lack of support system/caregiver;lives alone        Discharge Plan     Row Name 02/08/20 1518       Plan    Plan  TBD    Provided post acute provider list?  N/A    N/A Provider List Comment  Unsure of dc plans    Patient/Family in Agreement with Plan  other (see comments) Son    Plan Comments  Spoke with son at bedside. Pt. was not present and was in US for procedure. Pt. lives alone in [facility]. Has a niece that checks in on pt. Son lives in [place]. Pt. has become weak, not eating well. Son states she would be open  to rehab but if not she would need to come live with him in [place]. Will need to discuss options with pt. when she is in her room. Will cont. to follow.     Final Discharge Disposition Code  30 - still a patient        Destination      Coordination has not been started for this encounter.      Durable Medical Equipment      Coordination has not been started for this encounter.      Dialysis/Infusion      Coordination has not been started for this encounter.      Home Medical Care      Coordination has not been started for this encounter.      Therapy      Coordination has not been started for this encounter.      Community Resources      Coordination has not been started for this encounter.          Demographic Summary    No documentation.       Functional Status     Row Name 02/08/20 1515       Functional Status    Usual Activity Tolerance  fair       Functional Status, IADL    Medications  independent    Meal Preparation  independent    Housekeeping  independent    Laundry  independent    Shopping  assistive person        Psychosocial    No documentation.       Abuse/Neglect    No documentation.       Legal    No documentation.       Substance Abuse    No documentation.       Patient Forms    No documentation.           Cinthya Rios RN

## 2020-02-08 NOTE — ED PROVIDER NOTES
"    EMERGENCY DEPARTMENT ENCOUNTER      Pt Name: Varinder Delgado  MRN: [id number]  YOB: 1931  Date of evaluation: 2/7/2020  Provider: Naeem De La Cruz DO    CHIEF COMPLAINT       Chief Complaint   Patient presents with   • Hyperglycemia         HISTORY OF PRESENT ILLNESS  (Location/Symptom, Timing/Onset, Context/Setting, Quality, Duration, Modifying Factors, Severity.)   Varinder Delgado is a 88 y.o. female who presents to the emergency department with c/o hyperglycemia. She reports that her glucose measurements have been elevated over the last few days, measuring at 600 two days ago and 540 earlier today, prior to seeking medical emergency. She notes she has been feeling \"sleepy headed\". She notes vomiting over the last couple of days, appetite loss, diarrhea leg and feet swelling, but denies cough, congestion, frequent urination, and chest pain. She notes that her sugar has never been this elevated before. She has been taking her medications compliantly, including insulin, 6 units of Humalog, and hypertension medication. She sees Dr. De La Rosa, cardiologist, due to her heart attack 2 years ago. Dr. De La Rosa suggested that she lowered her insulin dosage recently, which caused to her blood sugar to drop too low. There are no other acute complaints at this time.      Nursing notes were reviewed.    REVIEW OF SYSTEMS    (2-9 systems for level 4, 10 or more for level 5)   ROS:  General:  No fevers, no chills, no weakness, +appetitie loss  Cardiovascular:  No chest pain, no palpitations, +leg and feet swelling  Respiratory:  No shortness of breath, no cough, no wheezing, no congestion  Gastrointestinal:  No pain, no nausea, +vomiting, +diarrhea  Musculoskeletal:  No muscle pain, no joint pain  Skin:  No rash, no easy bruising  Neurologic:  No speech problems, no headache, no extremity numbness, no extremity tingling, no extremity weakness  Psychiatric:  No anxiety  Genitourinary:  No dysuria, no hematuria, no " frequent urination    Except as noted above the remainder of the review of systems was reviewed and negative.       PAST MEDICAL HISTORY     Past Medical History:   Diagnosis Date   • Diabetes mellitus (CMS/HCC)    • Hyperlipidemia    • Hypertension          SURGICAL HISTORY       Past Surgical History:   Procedure Laterality Date   • CARDIAC CATHETERIZATION N/A 7/27/2018    Procedure: Left Heart Cath;  Surgeon: Sreekanth De La Rosa MD;  Location: [facility] CATH INVASIVE LOC[facility];  Service: Cardiology   • MOUTH SURGERY           CURRENT MEDICATIONS       Current Facility-Administered Medications:   •  dextrose (D50W) 25 g/ 50mL Intravenous Solution 12.5 g, 12.5 g, Intravenous, PRN, Naeem De La Cruz, DO  •  dextrose 5 % and sodium chloride 0.45 % infusion, 150 mL/hr, Intravenous, Continuous PRN, Naeem De La Cruz DO  •  dextrose 5 % and sodium chloride 0.45 % with KCl 20 mEq/L infusion, 150 mL/hr, Intravenous, Continuous PRN, Naeem De La Cruz, DO  •  dextrose 5 % and sodium chloride 0.45 % with KCl 40 mEq/L infusion, 150 mL/hr, Intravenous, Continuous PRN, Naeem De La Cruz DO  •  dextrose 5 % and sodium chloride 0.9 % 1,000 mL with potassium chloride 40 mEq infusion, 150 mL/hr, Intravenous, Continuous PRN, Naeem De La Cruz DO  •  dextrose 5 % and sodium chloride 0.9 % infusion, 150 mL/hr, Intravenous, Continuous PRN, Naeem De La Cruz, DO  •  dextrose 5 % and sodium chloride 0.9 % with KCl 20 mEq/L infusion, 150 mL/hr, Intravenous, Continuous PRN, Naeem De La Cruz DO  •  insulin regular 1 unit/mL in 0.9% sodium chloride, 4 Units/hr, Intravenous, Titrated, Sallie Lucas MD, Last Rate: 6 mL/hr at 02/08/20 0038, 6 Units/hr at 02/08/20 0038  •  nitroprusside (NIPRIDE) 50 mg in dextrose (D5W) 5 % 250 mL (0.2 mg/mL) infusion, 0.3 mcg/kg/min, Intravenous, Titrated, Naeem De La Cruz DO, Last Rate: 9.53 mL/hr at 02/08/20 0050, 0.5 mcg/kg/min at 02/08/20 0050  •  sodium chloride 0.45 %  1,000 mL with potassium chloride 40 mEq infusion, 250 mL/hr, Intravenous, Continuous PRN, Naeem De La Cruz DO  •  sodium chloride 0.45 % infusion, 250 mL/hr, Intravenous, Continuous PRN, Naeem De La Cruz DO  •  sodium chloride 0.45 % with KCl 20 mEq/L infusion, 250 mL/hr, Intravenous, Continuous PRN, Naeem De La Cruz DO  •  sodium chloride 0.9 % bolus 2,000 mL, 2,000 mL, Intravenous, Once, Naeem De La Cruz DO, Last Rate: 1,000 mL/hr at 02/08/20 0110, 2,000 mL at 02/08/20 0110  •  sodium chloride 0.9 % flush 10 mL, 10 mL, Intravenous, PRN, Naeem De La Cruz DO  •  sodium chloride 0.9 % flush 10 mL, 10 mL, Intravenous, PRN, Naeem De La Cruz DO  •  sodium chloride 0.9 % flush 10 mL, 10 mL, Intravenous, Once PRN, Naeem De La Cruz DO  •  sodium chloride 0.9 % flush 3 mL, 3 mL, Intravenous, Q12H, Naeem De La Cruz DO  •  sodium chloride 0.9 % infusion, 250 mL/hr, Intravenous, Continuous PRN, Naeem De La Cruz DO  •  sodium chloride 0.9 % infusion, 10 mL/hr, Intravenous, Continuous PRN, Naeem De La Cruz DO  •  sodium chloride 0.9 % with KCl 20 mEq/L infusion, 250 mL/hr, Intravenous, Continuous PRN, Naeem De La Cruz DO  •  sodium chloride 0.9 % with KCl 40 mEq/L infusion, 250 mL/hr, Intravenous, Continuous PRN, Naeem De La Cruz DO    Current Outpatient Medications:   •  acetaminophen (TYLENOL) 500 MG tablet, Take 500 mg by mouth Every 6 (Six) Hours As Needed for Mild Pain ., Disp: , Rfl:   •  amLODIPine (NORVASC) 10 MG tablet, Take 1 tablet by mouth Daily., Disp: 90 tablet, Rfl: 3  •  aspirin 81 MG chewable tablet, Chew 81 mg Daily., Disp: , Rfl:   •  carvedilol (COREG) 6.25 MG tablet, PLEASE SEE ATTACHED FOR DETAILED DIRECTIONS, Disp: 180 tablet, Rfl: 0  •  glipiZIDE (GLUCOTROL) 5 MG tablet, Take 0.5 tablets by mouth Every Morning Before Breakfast., Disp: 30 tablet, Rfl: 0  •  hydrALAZINE (APRESOLINE) 50 MG tablet, Take 1 tablet by mouth Every 8 (Eight)  Hours., Disp: 270 tablet, Rfl: 3  •  isosorbide mononitrate (IMDUR) 60 MG 24 hr tablet, Take 1 tablet by mouth Daily., Disp: 90 tablet, Rfl: 3  •  NOVOLOG FLEXPEN 100 UNIT/ML solution pen-injector sc pen, Inject 3 Units under the skin into the appropriate area as directed As Needed., Disp: , Rfl: 1  •  pitavastatin calcium (LIVALO) 2 MG tablet tablet, Take 1 tablet by mouth Every Night., Disp: 30 tablet, Rfl: 11  •  timolol (TIMOPTIC-XR) 0.5 % ophthalmic gel-forming, Administer 1 drop to both eyes 2 (Two) Times a Day., Disp: , Rfl:   •  travoprost, BAK free, (TRAVATAN Z) 0.004 % solution ophthalmic solution, Administer 1 drop to the right eye Every Night., Disp: , Rfl:     ALLERGIES     Contrast dye and Enalapril    FAMILY HISTORY       Family History   Problem Relation Age of Onset   • Heart failure Mother    • Liver cancer Father    • Diabetes Brother    • Heart failure Brother    • Kidney disease Brother    • No Known Problems Maternal Grandmother    • No Known Problems Maternal Grandfather    • No Known Problems Paternal Grandmother    • No Known Problems Paternal Grandfather    • Pulmonary embolism Sister    • Diabetes Sister           SOCIAL HISTORY       Social History     Socioeconomic History   • Marital status:      Spouse name: Not on file   • Number of children: Not on file   • Years of education: Not on file   • Highest education level: Not on file   Occupational History   • Occupation: Retired   Tobacco Use   • Smoking status: Former Smoker     Packs/day: 0.50     Years: 55.00     Pack years: 27.50     Types: Cigarettes     Start date: 2018     Last attempt to quit: 2018     Years since quittin.5   • Smokeless tobacco: Never Used   Substance and Sexual Activity   • Alcohol use: No   • Drug use: No   • Sexual activity: Defer   Social History Narrative    Caffeine intake:1-2  servings per day    Patientt  Lives alone at her home         PHYSICAL EXAM    (up to 7 for level 4, 8 or  more for level 5)     Vitals:    02/07/20 2119 02/07/20 2230 02/07/20 2330 02/08/20 0100   BP: (!) 244/115 (!) 241/132 (!) 232/114 (!) 227/101   BP Location:       Patient Position:       Pulse:  74 71 71   Resp:       Temp:       TempSrc:       SpO2:  94% 95% 96%   Weight:       Height:           Physical Exam  General :Patient is awake, alert, oriented, in no acute distress, nontoxic appearing, and answering questions appropriately.   HEENT: Pupils are equally round and reactive to light, EOMI, conjunctivae clear, sclerae white, there is no injection no icterus.  Oral mucosa is moist, no exudate. Uvula is midline  Neck: Neck is supple, full range of motion, trachea midline  Cardiac: Heart regular rate, rhythm, no murmurs, rubs, or gallops  Lungs: Lungs are clear to auscultation, there is no wheezing, rhonchi, or rales. Breath sounds are decreased bilaterally. There is no use of accessory muscles.  Chest wall: There is no tenderness to palpation over the chest wall or over ribs  Abdomen: Abdomen is soft, nontender, nondistended. There is no firm or pulsatile masses, no rebound rigidity or guarding.   Musculoskeletal: 5 out of 5 strength in all 4 extremities. No focal muscle deficits are appreciated. There is trace edema of lower extremities bilaterally.  Neuro: Motor intact, sensory intact, level of consciousness is normal, GCS is 15.  Dermatology: Skin is warm and dry  Psych: Mentation is grossly normal, cognition is grossly normal. Affect is appropriate.      DIAGNOSTIC RESULTS     EKG: All EKG's are interpreted by the Emergency Department Physician who either signs or Co-signs this chart in the absence of a cardiologist.    ECG 12 Lead   Final Result   Test Reason :    Blood Pressure : **/** mmHG   Vent. Rate : 075 BPM     Atrial Rate : 075 BPM      P-R Int : 176 ms          QRS Dur : 062 ms       QT Int : 394 ms       P-R-T Axes : 044 -17 100 degrees      QTc Int : 439 ms      Normal sinus rhythm   Septal  infarct , age undetermined   Abnormal ECG   When compared with ECG of 26-JUL-2018 05:28,   no acute sig changes   Confirmed by JC RAVI MD ([id number]) on 2/7/2020 10:39:02 PM      Referred By:             Confirmed By:JC RAVI MD          RADIOLOGY:   Non-plain film images such as CT, Ultrasound and MRI are read by the radiologist. Plain radiographic images are visualized and preliminarily interpreted by the emergency physician with the below findings:      [] Radiologist's Report Reviewed:  XR Chest 1 View   Final Result      1. No dense consolidation or effusion.      Signer Name: Walter Jones MD    Signed: 2/7/2020 10:31 PM    Workstation Name: [facility]     [facility]            ED BEDSIDE ULTRASOUND:   Performed by ED Physician - none    LABS:    I have reviewed and interpreted all of the currently available lab results from this visit (if applicable):  Results for orders placed or performed during the hospital encounter of 02/07/20   Comprehensive Metabolic Panel   Result Value Ref Range    Glucose 603 (C) 65 - 99 mg/dL    BUN 86 (H) 8 - 23 mg/dL    Creatinine 6.44 (H) 0.57 - 1.00 mg/dL    Sodium 126 (L) 136 - 145 mmol/L    Potassium 6.9 (C) 3.5 - 5.2 mmol/L    Chloride 91 (L) 98 - 107 mmol/L    CO2 15.0 (L) 22.0 - 29.0 mmol/L    Calcium 8.4 (L) 8.6 - 10.5 mg/dL    Total Protein 7.4 6.0 - 8.5 g/dL    Albumin 3.80 3.50 - 5.20 g/dL    ALT (SGPT) 14 1 - 33 U/L    AST (SGOT) 40 (H) 1 - 32 U/L    Alkaline Phosphatase 101 39 - 117 U/L    Total Bilirubin 0.3 0.2 - 1.2 mg/dL    eGFR Non  Amer      eGFR  African Amer 7 (L) >60 mL/min/1.73    Globulin 3.6 gm/dL    A/G Ratio 1.1 g/dL    BUN/Creatinine Ratio 13.4 7.0 - 25.0    Anion Gap 20.0 (H) 5.0 - 15.0 mmol/L   CBC Auto Differential   Result Value Ref Range    WBC 15.23 (H) 3.40 - 10.80 10*3/mm3    RBC 5.75 (H) 3.77 - 5.28 10*6/mm3    Hemoglobin 15.9 12.0 - 15.9 g/dL    Hematocrit 48.2 (H) 34.0 - 46.6 %    MCV 83.8 79.0 -  97.0 fL    MCH 27.7 26.6 - 33.0 pg    MCHC 33.0 31.5 - 35.7 g/dL    RDW 13.7 12.3 - 15.4 %    RDW-SD 41.6 37.0 - 54.0 fl    MPV 10.8 6.0 - 12.0 fL    Platelets 332 140 - 450 10*3/mm3    Neutrophil % 84.9 (H) 42.7 - 76.0 %    Lymphocyte % 10.8 (L) 19.6 - 45.3 %    Monocyte % 3.7 (L) 5.0 - 12.0 %    Eosinophil % 0.1 (L) 0.3 - 6.2 %    Basophil % 0.1 0.0 - 1.5 %    Immature Grans % 0.4 0.0 - 0.5 %    Neutrophils, Absolute 12.93 (H) 1.70 - 7.00 10*3/mm3    Lymphocytes, Absolute 1.64 0.70 - 3.10 10*3/mm3    Monocytes, Absolute 0.57 0.10 - 0.90 10*3/mm3    Eosinophils, Absolute 0.01 0.00 - 0.40 10*3/mm3    Basophils, Absolute 0.02 0.00 - 0.20 10*3/mm3    Immature Grans, Absolute 0.06 (H) 0.00 - 0.05 10*3/mm3    nRBC 0.0 0.0 - 0.2 /100 WBC   Beta Hydroxybutyrate Quantitative   Result Value Ref Range    Beta-Hydroxybutyrate Quant 0.869 (H) 0.020 - 0.270 mmol/L   Troponin   Result Value Ref Range    Troponin T 0.029 0.000 - 0.030 ng/mL   Comprehensive Metabolic Panel   Result Value Ref Range    Glucose 522 (C) 65 - 99 mg/dL    BUN 83 (H) 8 - 23 mg/dL    Creatinine 6.25 (H) 0.57 - 1.00 mg/dL    Sodium 125 (L) 136 - 145 mmol/L    Potassium 5.8 (H) 3.5 - 5.2 mmol/L    Chloride 91 (L) 98 - 107 mmol/L    CO2 13.0 (L) 22.0 - 29.0 mmol/L    Calcium 7.8 (L) 8.6 - 10.5 mg/dL    Total Protein 6.6 6.0 - 8.5 g/dL    Albumin 3.30 (L) 3.50 - 5.20 g/dL    ALT (SGPT) 9 1 - 33 U/L    AST (SGOT) 16 1 - 32 U/L    Alkaline Phosphatase 103 39 - 117 U/L    Total Bilirubin 0.3 0.2 - 1.2 mg/dL    eGFR Non  Amer      eGFR  African Amer 8 (L) >60 mL/min/1.73    Globulin 3.3 gm/dL    A/G Ratio 1.0 g/dL    BUN/Creatinine Ratio 13.3 7.0 - 25.0    Anion Gap 21.0 (H) 5.0 - 15.0 mmol/L   Phosphorus   Result Value Ref Range    Phosphorus 6.7 (H) 2.5 - 4.5 mg/dL   Magnesium   Result Value Ref Range    Magnesium 2.4 1.6 - 2.4 mg/dL   Hemoglobin A1c   Result Value Ref Range    Hemoglobin A1C 12.90 (H) 4.80 - 5.60 %   POC Glucose Once   Result Value Ref  Range    Glucose 390 (H) 70 - 130 mg/dL   POC Glucose Once   Result Value Ref Range    Glucose 352 (H) 70 - 130 mg/dL   Light Blue Top   Result Value Ref Range    Extra Tube hold for add-on    Green Top (Gel)   Result Value Ref Range    Extra Tube Hold for add-ons.    Lavender Top   Result Value Ref Range    Extra Tube hold for add-on    Gold Top - SST   Result Value Ref Range    Extra Tube Hold for add-ons.         All other labs were within normal range or not returned as of this dictation.      EMERGENCY DEPARTMENT COURSE and DIFFERENTIAL DIAGNOSIS/MDM:   Vitals:    Vitals:    02/07/20 2119 02/07/20 2230 02/07/20 2330 02/08/20 0100   BP: (!) 244/115 (!) 241/132 (!) 232/114 (!) 227/101   BP Location:       Patient Position:       Pulse:  74 71 71   Resp:       Temp:       TempSrc:       SpO2:  94% 95% 96%   Weight:       Height:           ED Course as of Feb 08 0119 Fri Feb 07, 2020 2153 Patient with multiple chronic comorbidities, presents today with hyperglycemia, overall not feeling well, blood sugars at home between 4 and 500 even though she is been taking her sliding scale insulin.  She does admit to having underlying hypertension, taking her medications only once a day instead of twice a day as prescribed.  She denies any headache or chest pains, no abdominal pains.  Does admit to intermittent vomiting/diarrhea.  Concern for possible dehydration as well.  Arrival she is awake and alert, GCS 15, no focal neurological deficit on my examination.  She does feel blood pressure 244/115.  We did give the patient a dose of IV Lopressor, with her hyperglycemia, she was given IV fluids, labs, beta hydroxy were obtained.    [AP]   Sat Feb 08, 2020   0107 Dr. De La Cruz discussed the case thoroughly with the hospitalist, who agreed to admit the patient.    [KO]      ED Course User Index  [AP] Naeem De La Cruz,   [KO] Caty Healy   !    Initial blood work reviewed as above.  The CMP did result with  hyperkalemia, concern for significant hemolysis, repeat metabolic panel was obtained.  There is a concern for anion gap acidosis, DKA, the patient was started on insulin infusion, multiple boluses of IV fluids.  Anion gap 20.  Mental status remains alert, no coma.  On continued recheck of her blood pressure, consistently over 220/110.  At this point I feel the patient would benefit from slow titration, initiated IV infusion.  Repeat metabolic panel with acute renal failure, creatinine 6.25, BUN of 83 this is been trending up over the last 1 week.  No known history of acute renal failure.  She was supposed to see a nephrologist shortly due to her worsening kidney function.  I feel acute dehydration is also playing a role as well as her severe hypertension causing her underlying renal disease.  Given her multiple issues she will require admission to the hospital for further evaluation, therapies and treatments.  Case discussed with our hospitalist team for admission.      MEDICATIONS ADMINISTERED IN ED:  Medications   sodium chloride 0.9 % flush 10 mL (has no administration in time range)   sodium chloride 0.9 % flush 3 mL (has no administration in time range)   sodium chloride 0.9 % flush 10 mL (has no administration in time range)   sodium chloride 0.9 % bolus 2,000 mL (2,000 mL Intravenous New Bag 2/8/20 0110)   sodium chloride 0.9 % infusion (has no administration in time range)   sodium chloride 0.9 % with KCl 20 mEq/L infusion (has no administration in time range)   sodium chloride 0.9 % with KCl 40 mEq/L infusion (has no administration in time range)   dextrose 5 % and sodium chloride 0.9 % infusion (has no administration in time range)   dextrose 5 % and sodium chloride 0.9 % 1,000 mL with potassium chloride 40 mEq infusion (has no administration in time range)   dextrose 5 % and sodium chloride 0.9 % with KCl 20 mEq/L infusion (has no administration in time range)   sodium chloride 0.45 % infusion (has no  administration in time range)   sodium chloride 0.45 % with KCl 20 mEq/L infusion (has no administration in time range)   sodium chloride 0.45 % 1,000 mL with potassium chloride 40 mEq infusion (has no administration in time range)   dextrose 5 % and sodium chloride 0.45 % infusion (has no administration in time range)   dextrose 5 % and sodium chloride 0.45 % with KCl 20 mEq/L infusion (has no administration in time range)   dextrose 5 % and sodium chloride 0.45 % with KCl 40 mEq/L infusion (has no administration in time range)   insulin regular 1 unit/mL in 0.9% sodium chloride (6 Units/hr Intravenous New Bag 2/8/20 0038)   dextrose (D50W) 25 g/ 50mL Intravenous Solution 12.5 g (has no administration in time range)   sodium chloride 0.9 % flush 10 mL (has no administration in time range)   sodium chloride 0.9 % infusion (has no administration in time range)   nitroprusside (NIPRIDE) 50 mg in dextrose (D5W) 5 % 250 mL (0.2 mg/mL) infusion (0.5 mcg/kg/min × 63.5 kg Intravenous Rate/Dose Change 2/8/20 0050)   sodium chloride 0.9 % bolus 1,000 mL (0 mL Intravenous Stopped 2/7/20 2344)   metoprolol tartrate (LOPRESSOR) injection 5 mg (5 mg Intravenous Given 2/7/20 2716)       PROCEDURES:  Procedures    CRITICAL CARE TIME    Total Critical Care time was 45 minutes, excluding separately reportable procedures.  Acute DKA, hypertensive urgency requiring multiple interventions, re-evaluations.  There was a high probability of clinically significant/life threatening deterioration in the patient's condition which required my urgent intervention.      FINAL IMPRESSION      1. Diabetic ketoacidosis without coma associated with other specified diabetes mellitus (CMS/[facility])    2. Hypertensive urgency    3. Renal failure, unspecified chronicity          DISPOSITION/PLAN     ED Disposition     ED Disposition Condition Comment    Decision to Admit  Level of Care: Telemetry [5]   Diagnosis: Diabetic ketoacidosis without coma associated  with other specified diabetes mellitus (CMS/[facility]) [[id number]]   Admitting Physician: YURY RUSSO [[id number]]   Attending Physician: YURY RUSSO [[id number]]   Certification: I Certify That Inpatient Hospital Services Are Medically Necessary For Greater Than 2 Midnights            PATIENT REFERRED TO:  No follow-up provider specified.    DISCHARGE MEDICATIONS:     Medication List      ASK your doctor about these medications    acetaminophen 500 MG tablet  Commonly known as:  TYLENOL     amLODIPine 10 MG tablet  Commonly known as:  NORVASC  Take 1 tablet by mouth Daily.     aspirin 81 MG chewable tablet     carvedilol 6.25 MG tablet  Commonly known as:  COREG  PLEASE SEE ATTACHED FOR DETAILED DIRECTIONS     glipizide 5 MG tablet  Commonly known as:  GLUCOTROL  Take 0.5 tablets by mouth Every Morning Before Breakfast.     hydrALAZINE 50 MG tablet  Commonly known as:  APRESOLINE  Take 1 tablet by mouth Every 8 (Eight) Hours.     isosorbide mononitrate 60 MG 24 hr tablet  Commonly known as:  IMDUR  Take 1 tablet by mouth Daily.     NOVOLOG FLEXPEN 100 UNIT/ML solution pen-injector sc pen  Generic drug:  insulin aspart     pitavastatin calcium 2 MG tablet tablet  Commonly known as:  LIVALO  Take 1 tablet by mouth Every Night.     timolol 0.5 % ophthalmic gel-forming  Commonly known as:  TIMOPTIC-XR     TRAVATAN Z 0.004 % solution ophthalmic solution  Generic drug:  travoprost (CAROLINE free)            Documentation assistance provided by Caty Healy acting as scribe for Dr. Naeem De La Cruz.     The scribe's documentation has been prepared under my direction and personally reviewed by me in its entirety.  I confirm that the note above accurately reflects all work, treatment, procedures, and medical decision making performed by me.      Comment: Please note this report has been produced using speech recognition software.      Naeem De La Cruz DO  Attending Emergency Physician                 Caty Healy  02/07/20  2244       Caty Healy  02/07/20 2308       Naeem De La Cruz DO  02/08/20 0118

## 2020-02-08 NOTE — PLAN OF CARE
Problem: Patient Care Overview  Goal: Plan of Care Review  Outcome: Ongoing (interventions implemented as appropriate)  Flowsheets (Taken 2/8/2020 1249)  Progress: no change  Plan of Care Reviewed With: patient; family; other (see comments) (Karol GUTIERREZ)  Note:   WOC nurse consulted to assess right heel wound and rash under left breast. Pt has dry, eschar covered fissure right heel; painted with Betadine. Bilateral feet/heel skin very dry and scaly; Calazime cream applied to bilateral feet for moisture. Under left breast yeast rash present; pt has Nystatin powder ordered. InterDry placed under left breast for wicking. See wound orders. WOC nurse will f/u. Please contact WOC nurse as needed for concerns.

## 2020-02-08 NOTE — H&P
[facility]  HISTORY AND PHYSICAL    Patient Name: Varinder Delgado  : 1931  MRN: [id number]  Primary Care Physician: Santiago Kuhn MD    Subjective   Subjective     Chief Complaint:confusion      HPI:  Varinder Delgado is a 88 y.o. female with HO CAD, HTN, Type2 DM on insulin who has not felt well the last few days. She does report some diarrhea, some vomiting, not eating much (except for undisclosed number or Reeses).  Her son came to visit and she was very confused, unable to check her glucose correctly and had fallen off the couch- wher she sleeps most of the day.  In the ED she was found to have new renal failure, DKA and accelerated hypertension.  She denies any complaints except a sore on her right heel, anorexia and sleepiness.  Decreased urine output over the last day.    Review of Systems   She denies headache, change in vision, chest pain ,shortness of breath.  Otherwise complete 10-system ROS is negative except as mentioned in the HPI.    Personal History     Past Medical History:   Diagnosis Date   • Diabetes mellitus (CMS/HCC)    • Hyperlipidemia    • Hypertension        Past Surgical History:   Procedure Laterality Date   • CARDIAC CATHETERIZATION N/A 2018    Procedure: Left Heart Cath;  Surgeon: Sreekanth De La Rosa MD;  Location: [facility]erg[facility] INVASIVE LOCATION;  Service: Cardiology   • MOUTH SURGERY         Family History: family history includes Diabetes in her brother and sister; Heart failure in her brother and mother; Kidney disease in her brother; Liver cancer in her father; No Known Problems in her maternal grandfather, maternal grandmother, paternal grandfather, and paternal grandmother; Pulmonary embolism in her sister.     Social History:  reports that she quit smoking about 18 months ago. Her smoking use included cigarettes. She started smoking about 18 months ago. She has a 27.50 pack-year smoking history. She has never used smokeless  tobacco. She reports that she does not drink alcohol or use drugs.  Lives alone, retired [employer], one son who lives in [place]  Medications:--NOT COMPLIANT  Prior to Admission medications    Medication Sig Start Date End Date Taking? Authorizing Provider   acetaminophen (TYLENOL) 500 MG tablet Take 500 mg by mouth Every 6 (Six) Hours As Needed for Mild Pain .    Liliana Levy MD   amLODIPine (NORVASC) 10 MG tablet Take 1 tablet by mouth Daily. 11/13/18   Nichole Santana APRN   aspirin 81 MG chewable tablet Chew 81 mg Daily.    Liliana Levy MD   carvedilol (COREG) 6.25 MG tablet PLEASE SEE ATTACHED FOR DETAILED DIRECTIONS 12/16/19   Sreekanth De La Rosa MD   glipiZIDE (GLUCOTROL) 5 MG tablet Take 0.5 tablets by mouth Every Morning Before Breakfast. 7/30/18   Lisa Antonio APRN   hydrALAZINE (APRESOLINE) 50 MG tablet Take 1 tablet by mouth Every 8 (Eight) Hours. 11/26/18   Sreekanth De La Rosa MD   isosorbide mononitrate (IMDUR) 60 MG 24 hr tablet Take 1 tablet by mouth Daily. 12/18/18   Nichole Santana APRN   NOVOLOG FLEXPEN 100 UNIT/ML solution pen-injector sc pen Inject 3 Units under the skin into the appropriate area as directed As Needed. 5/20/18   Liliana Levy MD   pitavastatin calcium (LIVALO) 2 MG tablet tablet Take 1 tablet by mouth Every Night. 7/15/19   Alondra Perez APRN   timolol (TIMOPTIC-XR) 0.5 % ophthalmic gel-forming Administer 1 drop to both eyes 2 (Two) Times a Day.    Liliana Levy MD   travoprost, BAK free, (TRAVATAN Z) 0.004 % solution ophthalmic solution Administer 1 drop to the right eye Every Night. 11/3/16   Liliana Levy MD       Allergies   Allergen Reactions   • Contrast Dye Swelling   • Enalapril Angioedema       Objective   Objective     Vital Signs:   Temp:  [97.7 °F (36.5 °C)] 97.7 °F (36.5 °C)  Heart Rate:  [71-86] 71  Resp:  [18] 18  BP: (227-244)/(101-132) 227/101 on Nipride drip  Physical Exam   Patient is alert and talkative in no  distress at rest, cute as can bee - periorbital edema  Neck is without mass or JVD  Heart is Reg wo murmur  Lungs are clear wo wheeze or crackle  Abd is soft without HSM or mass, not tender or distended  MAEW, good strength, sensation  Skin is without rash- dry skin on her feet  Neurologic exam in nonfocal   Mood is appropriate, laughing and kind, oriented but forgetful    Results Reviewed:  I have personally reviewed current lab, radiology, and data and agree.    Results from last 7 days   Lab Units 02/07/20  2120   WBC 10*3/mm3 15.23*   HEMOGLOBIN g/dL 15.9   HEMATOCRIT % 48.2*   PLATELETS 10*3/mm3 332     Results from last 7 days   Lab Units 02/07/20  2300   SODIUM mmol/L 125*   POTASSIUM mmol/L 5.8*   CHLORIDE mmol/L 91*   CO2 mmol/L 13.0*   BUN mg/dL 83*   CREATININE mg/dL 6.25*   GLUCOSE mg/dL 522*   CALCIUM mg/dL 7.8*   ALT (SGPT) U/L 9   AST (SGOT) U/L 16       Imaging Results (Last 24 Hours)     Procedure Component Value Units Date/Time    XR Chest 1 View [[id number]] Collected:  02/07/20 2231     Updated:  02/07/20 2233    Narrative:       CR Chest 1 Vw    INDICATION:   88-year-old female with generalized weakness nausea and vomiting diabetes and hypertension. Hyperglycemia.     COMPARISON:    7/28/2018    FINDINGS:  Single portable AP view(s) of the chest.  The cardiac silhouette is within normal limits. The vascularity is unremarkable. Low lung volumes. There is some probable atelectasis or scarring in the right perihilar lung. No pneumothorax effusion or dense  consolidation. Improved appearance compared to the prior study.      Impression:         1. No dense consolidation or effusion.    Signer Name: Walter Jones MD   Signed: 2/7/2020 10:31 PM   Work[facility] Name[facility]    [facility]        Results for orders placed during the hospital encounter of 10/22/18   Adult Transthoracic Echo Limited W/ Cont if Necessary Per Protocol    Narrative · This was a limited  echocardiogram to assess left ventricular ejection   fraction.  · Left ventricular systolic function is normal. Estimated EF = 60%.  · Left ventricular wall thickness is consistent with moderate concentric   hypertrophy.          Assessment/Plan   Assessment / Plan     Active Hospital Problems    Diagnosis   • Diabetic acidosis without coma (CMS/HCC)   • Diarrhea of presumed infectious origin   • Accelerated hypertension       Assessment & Plan:    Varinder Delgado is a 88 y.o. female with HO CAD, HTN, Type2 DM on insulin who has not felt well the last few days. She does report some diarrhea, some vomiting, not eating much (except for undisclosed number or Reeses).  Her son came to visit and she was very confused, unable to check her glucose correctly and had fallen off the couch- wher she sleeps most of the day.  In the ED she was found to have new renal failure, DKA and accelerated hypertension.    DKA-  S/p 3 liter of NS  -cont insulin drip and frequent BMP  -replace electrolyte    NEW acute renal failure  - check urine studies (no recent change in labs)  -check stool studies for enterotoxic infection  -hydrate    Accelerated hypertension/Urgency  -change nipride to cardene- titrate to 180-200  -start PO meds  -hopefully just from not taking her medication  -but renal failure is certainly worrisome.    ?fatigue/  -possible poorly controlled chronic conditions, possible CATHRYN  We discussed the the next step plan for her living situation.  -check TSH      DVT prophylaxis: HEP SQ    CODE STATUS:FULL Code    Admission Status: inpatient status for need of IV insulin, IV BP meds that will require 2 midnights in the hospital.      Electronically signed by Sallie Lucas MD 02/08/20 1:54 AM

## 2020-02-08 NOTE — CONSULTS
Patient Care Team:  Santiago Kuhn MD as PCP - General (Internal Medicine)    Chief complaint: SALOMÓN       Subjective     Ms Sandy is a 87 yo lady with hx of DM, HTN, CAD. She is admitted recently with severe hyperglycemia, confusion. On admission noted to have severe Salomón w cr 6.6mg/dl K 6.9 on admission. Per the review of the records patient's last known stable cr was 1.6mg/dl 7 days ago. Prior to that her cr historically has been around 1.2mg/dl. Per the patient she hasn't taken any NSAID recently. Grand daughter was in her room did mention she was consuming lot of candies and wasn't sure if she was taking her meds. She did have diarrhea once yesterday. In the ER patient was found to have 300cc of urine on bladder s/p straigh cath. Later this morning she was again found to have 300cc of uop. Kohli was initially placed by later taken out. On admission she had severe HTN w bp in 200s. Most recent bp in 120s. Unclear if she was compliant with bp meds.       Review of Systems   Constitutional: Negative for activity change, appetite change and fatigue.   HENT: Negative.    Respiratory: Negative.    Cardiovascular: Negative.    Gastrointestinal: Positive for diarrhea. Negative for nausea and vomiting.   Genitourinary: Negative.    Musculoskeletal: Negative.    Neurological: Negative for dizziness, tremors, seizures, speech difficulty, weakness, numbness and headaches.        Past Medical History:   Diagnosis Date   • Diabetes mellitus (CMS/HCC)    • Hyperlipidemia    • Hypertension    • MI (myocardial infarction) (CMS/HCC)    ,   Past Surgical History:   Procedure Laterality Date   • CARDIAC CATHETERIZATION N/A 7/27/2018    Procedure: Left Heart Cath;  Surgeon: Sreekanth De La Rosa MD;  Location: [facility] CATH [facility];  Service: Cardiology   • MOUTH SURGERY     ,   Family History   Problem Relation Age of Onset   • Heart failure Mother    • Liver cancer Father    • Diabetes Brother    • Heart failure Brother    •  Kidney disease Brother    • No Known Problems Maternal Grandmother    • No Known Problems Maternal Grandfather    • No Known Problems Paternal Grandmother    • No Known Problems Paternal Grandfather    • Pulmonary embolism Sister    • Diabetes Sister    ,   Social History     Tobacco Use   • Smoking status: Current Every Day Smoker     Packs/day: 0.50     Years: 55.00     Pack years: 27.50     Types: Cigarettes     Start date: 2018     Last attempt to quit: 2018     Years since quittin.5   • Smokeless tobacco: Never Used   Substance Use Topics   • Alcohol use: No   • Drug use: No   ,   Medications Prior to Admission   Medication Sig Dispense Refill Last Dose   • amLODIPine (NORVASC) 10 MG tablet Take 1 tablet by mouth Daily. 90 tablet 3 2020 at Unknown time   • aspirin 81 MG chewable tablet Chew 81 mg Daily.   Past Week at Unknown time   • carvedilol (COREG) 6.25 MG tablet PLEASE SEE ATTACHED FOR DETAILED DIRECTIONS 180 tablet 0 Past Week at Unknown time   • glipiZIDE (GLUCOTROL) 5 MG tablet Take 0.5 tablets by mouth Every Morning Before Breakfast. 30 tablet 0 Past Week at Unknown time   • hydrALAZINE (APRESOLINE) 50 MG tablet Take 1 tablet by mouth Every 8 (Eight) Hours. 270 tablet 3 Past Week at Unknown time   • isosorbide mononitrate (IMDUR) 60 MG 24 hr tablet Take 1 tablet by mouth Daily. 90 tablet 3 Past Week at Unknown time   • NOVOLOG FLEXPEN 100 UNIT/ML solution pen-injector sc pen Inject 3 Units under the skin into the appropriate area as directed As Needed.  1 Past Week at Unknown time   • pitavastatin calcium (LIVALO) 2 MG tablet tablet Take 1 tablet by mouth Every Night. 30 tablet 11 Past Week at Unknown time   • timolol (TIMOPTIC-XR) 0.5 % ophthalmic gel-forming Administer 1 drop to both eyes 2 (Two) Times a Day.   Past Week at Unknown time   • travoprost, BAK free, (TRAVATAN Z) 0.004 % solution ophthalmic solution Administer 1 drop to the right eye Every Night.   Past Week at Unknown  time   • acetaminophen (TYLENOL) 500 MG tablet Take 500 mg by mouth Every 6 (Six) Hours As Needed for Mild Pain .   Taking    and Scheduled Meds:    amLODIPine 10 mg Oral Daily   aspirin 81 mg Oral Daily   carvedilol 6.25 mg Oral BID With Meals   heparin (porcine) 5,000 Units Subcutaneous Q12H   hydrALAZINE 50 mg Oral Q8H   isosorbide mononitrate 60 mg Oral Q24H   latanoprost 1 drop Right Eye Nightly   nystatin  Topical Q12H   sodium chloride 3 mL Intravenous Q12H   timolol 1 drop Both Eyes Q12H       Objective     Vital Signs  Temp:  [97.7 °F (36.5 °C)] 97.7 °F (36.5 °C)  Heart Rate:  [70-86] 72  Resp:  [18-20] 18  BP: (119-244)/() 119/83    I/O this shift:  In: 373 [I.V.:373]  Out: 150 [Urine:150]  I/O last 3 completed shifts:  In: 2000 [I.V.:1000; IV Piggyback:1000]  Out: -     Physical Exam   Constitutional: She is oriented to person, place, and time. She appears well-developed and well-nourished.   HENT:   Head: Normocephalic and atraumatic.   Oral mucosa dry    Eyes: Pupils are equal, round, and reactive to light. EOM are normal.   Neck: Normal range of motion.   Cardiovascular: Normal rate, regular rhythm and normal heart sounds. Exam reveals no friction rub.   No murmur heard.  Pulmonary/Chest: Effort normal.   Abdominal: Soft. Bowel sounds are normal.   Musculoskeletal: Normal range of motion. She exhibits no edema.   Neurological: She is alert and oriented to person, place, and time.   Skin: Skin is warm.       Results Review:    I reviewed the patient's new clinical results.    WBC WBC   Date Value Ref Range Status   02/07/2020 15.23 (H) 3.40 - 10.80 10*3/mm3 Final      HGB Hemoglobin   Date Value Ref Range Status   02/07/2020 15.9 12.0 - 15.9 g/dL Final      HCT Hematocrit   Date Value Ref Range Status   02/07/2020 48.2 (H) 34.0 - 46.6 % Final      Platlets No results found for: LABPLAT   MCV MCV   Date Value Ref Range Status   02/07/2020 83.8 79.0 - 97.0 fL Final          Sodium Sodium   Date  Value Ref Range Status   02/08/2020 132 (L) 136 - 145 mmol/L Final   02/08/2020 128 (L) 136 - 145 mmol/L Final   02/07/2020 125 (L) 136 - 145 mmol/L Final   02/07/2020 126 (L) 136 - 145 mmol/L Final      Potassium Potassium   Date Value Ref Range Status   02/08/2020 4.8 3.5 - 5.2 mmol/L Final   02/08/2020 5.2 3.5 - 5.2 mmol/L Final   02/07/2020 5.8 (H) 3.5 - 5.2 mmol/L Final   02/07/2020 6.9 (C) 3.5 - 5.2 mmol/L Final     Comment:     Specimen hemolyzed.  Results may be affected.      Chloride Chloride   Date Value Ref Range Status   02/08/2020 99 98 - 107 mmol/L Final   02/08/2020 97 (L) 98 - 107 mmol/L Final   02/07/2020 91 (L) 98 - 107 mmol/L Final   02/07/2020 91 (L) 98 - 107 mmol/L Final      CO2 CO2   Date Value Ref Range Status   02/08/2020 14.0 (L) 22.0 - 29.0 mmol/L Final   02/08/2020 12.0 (L) 22.0 - 29.0 mmol/L Final   02/07/2020 13.0 (L) 22.0 - 29.0 mmol/L Final   02/07/2020 15.0 (L) 22.0 - 29.0 mmol/L Final      BUN BUN   Date Value Ref Range Status   02/08/2020 78 (H) 8 - 23 mg/dL Final   02/08/2020 78 (H) 8 - 23 mg/dL Final   02/07/2020 83 (H) 8 - 23 mg/dL Final   02/07/2020 86 (H) 8 - 23 mg/dL Final      Creatinine Creatinine   Date Value Ref Range Status   02/08/2020 5.77 (H) 0.57 - 1.00 mg/dL Final   02/08/2020 5.61 (H) 0.57 - 1.00 mg/dL Final   02/07/2020 6.25 (H) 0.57 - 1.00 mg/dL Final   02/07/2020 6.44 (H) 0.57 - 1.00 mg/dL Final      Calcium Calcium   Date Value Ref Range Status   02/08/2020 7.6 (L) 8.6 - 10.5 mg/dL Final   02/08/2020 7.2 (L) 8.6 - 10.5 mg/dL Final   02/07/2020 7.8 (L) 8.6 - 10.5 mg/dL Final   02/07/2020 8.4 (L) 8.6 - 10.5 mg/dL Final      PO4 No results found for: CAPO4   Albumin Albumin   Date Value Ref Range Status   02/07/2020 3.30 (L) 3.50 - 5.20 g/dL Final   02/07/2020 3.80 3.50 - 5.20 g/dL Final      Magnesium Magnesium   Date Value Ref Range Status   02/07/2020 2.4 1.6 - 2.4 mg/dL Final      Uric Acid No results found for: URICACID         Assessment/Plan       Diabetes  mellitus (CMS/HCC)    Hypertensive emergency    Diabetic acidosis without coma (CMS/[facility])    Diarrhea of presumed infectious origin    Essential hypertension    Acute renal failure (ARF) (CMS/[facility])    Type 2 diabetes mellitus with diabetic nephropathy, with long-term current use of insulin (CMS/[facility])    Hyponatremia    Glaucoma      Assessment:  (Oliguric SALOMÓN   - Cr baseline 1.2mg/dl. On admission 6.6 mg/dl  - HTN crisis on admission. 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   - F/u renal US to rule out post renal SALOMÓN     Hyperkalemia: On admission better     Met acidosis: In the setting of severe SALOMÓN and DKA    Hyponatremia: Dilutional in the setting of high blood glucose   Bicarb better     Hyperglycemia: Concern for DKA on admission   On Insulin gtt.     HTN: In crisis on admission. Could this be malignant HTN ? Possible   - Need renal doppler to rule out RANDI    AMS: Likely metabolic encephalopath with HTN and hyperglycemia       ).     Plan:   (- SALOMÓN likely due to hemodynamic injury with severe HTN and large variation in blood pressure. Would still continue IV fluids for continued volume expansion, avoid large variation in bp    - Change IV fluids to D5w+150meqs of Nabicarb @ 100cc/hr  - Renal US and doppler   - Kohli cath   - Strict I/O  - Serial BMP monitoring   - Serological workup including DANA, ANCA, antiGBM  - Dose meds to eGFR<15  - High risk for requiring RRT if no significant improvement in renal function in 24-48 hr.).             Keith Appiah MD  02/08/20  11:52 AM

## 2020-02-09 PROBLEM — N17.0 ATN (ACUTE TUBULAR NECROSIS) (HCC): Status: ACTIVE | Noted: 2020-02-09

## 2020-02-09 LAB
ANION GAP SERPL CALCULATED.3IONS-SCNC: 15 MMOL/L (ref 5–15)
ANION GAP SERPL CALCULATED.3IONS-SCNC: 15 MMOL/L (ref 5–15)
BACTERIA SPEC AEROBE CULT: NO GROWTH
BUN BLD-MCNC: 80 MG/DL (ref 8–23)
BUN BLD-MCNC: 81 MG/DL (ref 8–23)
BUN/CREAT SERPL: 15.7 (ref 7–25)
BUN/CREAT SERPL: 15.7 (ref 7–25)
CALCIUM SPEC-SCNC: 7.4 MG/DL (ref 8.6–10.5)
CALCIUM SPEC-SCNC: 7.6 MG/DL (ref 8.6–10.5)
CHLORIDE SERPL-SCNC: 100 MMOL/L (ref 98–107)
CHLORIDE SERPL-SCNC: 102 MMOL/L (ref 98–107)
CO2 SERPL-SCNC: 18 MMOL/L (ref 22–29)
CO2 SERPL-SCNC: 19 MMOL/L (ref 22–29)
CREAT BLD-MCNC: 5.09 MG/DL (ref 0.57–1)
CREAT BLD-MCNC: 5.17 MG/DL (ref 0.57–1)
DEPRECATED RDW RBC AUTO: 42.9 FL (ref 37–54)
ERYTHROCYTE [DISTWIDTH] IN BLOOD BY AUTOMATED COUNT: 14.1 % (ref 12.3–15.4)
GFR SERPL CREATININE-BSD FRML MDRD: 10 ML/MIN/1.73
GFR SERPL CREATININE-BSD FRML MDRD: 10 ML/MIN/1.73
GFR SERPL CREATININE-BSD FRML MDRD: ABNORMAL ML/MIN/{1.73_M2}
GFR SERPL CREATININE-BSD FRML MDRD: ABNORMAL ML/MIN/{1.73_M2}
GLUCOSE BLD-MCNC: 123 MG/DL (ref 65–99)
GLUCOSE BLD-MCNC: 125 MG/DL (ref 65–99)
GLUCOSE BLDC GLUCOMTR-MCNC: 109 MG/DL (ref 70–130)
GLUCOSE BLDC GLUCOMTR-MCNC: 114 MG/DL (ref 70–130)
GLUCOSE BLDC GLUCOMTR-MCNC: 115 MG/DL (ref 70–130)
GLUCOSE BLDC GLUCOMTR-MCNC: 119 MG/DL (ref 70–130)
GLUCOSE BLDC GLUCOMTR-MCNC: 123 MG/DL (ref 70–130)
GLUCOSE BLDC GLUCOMTR-MCNC: 130 MG/DL (ref 70–130)
GLUCOSE BLDC GLUCOMTR-MCNC: 130 MG/DL (ref 70–130)
GLUCOSE BLDC GLUCOMTR-MCNC: 154 MG/DL (ref 70–130)
GLUCOSE BLDC GLUCOMTR-MCNC: 234 MG/DL (ref 70–130)
GLUCOSE BLDC GLUCOMTR-MCNC: 248 MG/DL (ref 70–130)
GLUCOSE BLDC GLUCOMTR-MCNC: 320 MG/DL (ref 70–130)
GLUCOSE BLDC GLUCOMTR-MCNC: 95 MG/DL (ref 70–130)
HCT VFR BLD AUTO: 35.5 % (ref 34–46.6)
HGB BLD-MCNC: 11.9 G/DL (ref 12–15.9)
MAGNESIUM SERPL-MCNC: 2 MG/DL (ref 1.6–2.4)
MCH RBC QN AUTO: 28.1 PG (ref 26.6–33)
MCHC RBC AUTO-ENTMCNC: 33.5 G/DL (ref 31.5–35.7)
MCV RBC AUTO: 83.7 FL (ref 79–97)
PHOSPHATE SERPL-MCNC: 4.8 MG/DL (ref 2.5–4.5)
PLATELET # BLD AUTO: 206 10*3/MM3 (ref 140–450)
PMV BLD AUTO: 10.7 FL (ref 6–12)
POTASSIUM BLD-SCNC: 3.9 MMOL/L (ref 3.5–5.2)
POTASSIUM BLD-SCNC: 4.1 MMOL/L (ref 3.5–5.2)
RBC # BLD AUTO: 4.24 10*6/MM3 (ref 3.77–5.28)
SODIUM BLD-SCNC: 133 MMOL/L (ref 136–145)
SODIUM BLD-SCNC: 136 MMOL/L (ref 136–145)
WBC NRBC COR # BLD: 10.16 10*3/MM3 (ref 3.4–10.8)

## 2020-02-09 PROCEDURE — 85027 COMPLETE CBC AUTOMATED: CPT | Performed by: INTERNAL MEDICINE

## 2020-02-09 PROCEDURE — 25010000002 FUROSEMIDE PER 20 MG: Performed by: INTERNAL MEDICINE

## 2020-02-09 PROCEDURE — 63710000001 INSULIN LISPRO (HUMAN) PER 5 UNITS: Performed by: INTERNAL MEDICINE

## 2020-02-09 PROCEDURE — 83735 ASSAY OF MAGNESIUM: CPT | Performed by: INTERNAL MEDICINE

## 2020-02-09 PROCEDURE — 84100 ASSAY OF PHOSPHORUS: CPT | Performed by: INTERNAL MEDICINE

## 2020-02-09 PROCEDURE — 25010000002 HEPARIN (PORCINE) PER 1000 UNITS: Performed by: INTERNAL MEDICINE

## 2020-02-09 PROCEDURE — 82962 GLUCOSE BLOOD TEST: CPT

## 2020-02-09 PROCEDURE — 99233 SBSQ HOSP IP/OBS HIGH 50: CPT | Performed by: INTERNAL MEDICINE

## 2020-02-09 PROCEDURE — 80048 BASIC METABOLIC PNL TOTAL CA: CPT | Performed by: NURSE PRACTITIONER

## 2020-02-09 RX ORDER — LABETALOL HYDROCHLORIDE 5 MG/ML
10 INJECTION, SOLUTION INTRAVENOUS
Status: DISCONTINUED | OUTPATIENT
Start: 2020-02-09 | End: 2020-02-14 | Stop reason: HOSPADM

## 2020-02-09 RX ORDER — FUROSEMIDE 10 MG/ML
80 INJECTION INTRAMUSCULAR; INTRAVENOUS ONCE
Status: COMPLETED | OUTPATIENT
Start: 2020-02-09 | End: 2020-02-09

## 2020-02-09 RX ORDER — IPRATROPIUM BROMIDE AND ALBUTEROL SULFATE 2.5; .5 MG/3ML; MG/3ML
3 SOLUTION RESPIRATORY (INHALATION) EVERY 4 HOURS PRN
Status: DISCONTINUED | OUTPATIENT
Start: 2020-02-09 | End: 2020-02-14 | Stop reason: HOSPADM

## 2020-02-09 RX ORDER — NICOTINE POLACRILEX 4 MG
15 LOZENGE BUCCAL
Status: DISCONTINUED | OUTPATIENT
Start: 2020-02-09 | End: 2020-02-14 | Stop reason: HOSPADM

## 2020-02-09 RX ORDER — DEXTROSE MONOHYDRATE 25 G/50ML
25 INJECTION, SOLUTION INTRAVENOUS
Status: DISCONTINUED | OUTPATIENT
Start: 2020-02-09 | End: 2020-02-14 | Stop reason: HOSPADM

## 2020-02-09 RX ADMIN — NYSTATIN: 100000 POWDER TOPICAL at 21:58

## 2020-02-09 RX ADMIN — ASPIRIN 81 MG CHEWABLE TABLET 81 MG: 81 TABLET CHEWABLE at 08:30

## 2020-02-09 RX ADMIN — FUROSEMIDE 80 MG: 10 INJECTION, SOLUTION INTRAMUSCULAR; INTRAVENOUS at 21:38

## 2020-02-09 RX ADMIN — INSULIN LISPRO 4 UNITS: 100 INJECTION, SOLUTION INTRAVENOUS; SUBCUTANEOUS at 16:42

## 2020-02-09 RX ADMIN — INSULIN LISPRO 2 UNITS: 100 INJECTION, SOLUTION INTRAVENOUS; SUBCUTANEOUS at 21:39

## 2020-02-09 RX ADMIN — POVIDONE-IODINE: 10 SOLUTION TOPICAL at 08:31

## 2020-02-09 RX ADMIN — INSULIN LISPRO 4 UNITS: 100 INJECTION, SOLUTION INTRAVENOUS; SUBCUTANEOUS at 12:00

## 2020-02-09 RX ADMIN — HEPARIN SODIUM 5000 UNITS: 5000 INJECTION, SOLUTION INTRAVENOUS; SUBCUTANEOUS at 08:30

## 2020-02-09 RX ADMIN — AMLODIPINE BESYLATE 10 MG: 10 TABLET ORAL at 08:30

## 2020-02-09 RX ADMIN — CARVEDILOL 6.25 MG: 6.25 TABLET, FILM COATED ORAL at 08:30

## 2020-02-09 RX ADMIN — CARVEDILOL 6.25 MG: 6.25 TABLET, FILM COATED ORAL at 16:43

## 2020-02-09 RX ADMIN — TIMOLOL MALEATE 1 DROP: 5 SOLUTION/ DROPS OPHTHALMIC at 08:30

## 2020-02-09 RX ADMIN — INSULIN LISPRO 7 UNITS: 100 INJECTION, SOLUTION INTRAVENOUS; SUBCUTANEOUS at 14:31

## 2020-02-09 RX ADMIN — LABETALOL 20 MG/4 ML (5 MG/ML) INTRAVENOUS SYRINGE 10 MG: at 17:42

## 2020-02-09 RX ADMIN — NYSTATIN: 100000 POWDER TOPICAL at 08:30

## 2020-02-09 RX ADMIN — FUROSEMIDE 80 MG: 10 INJECTION, SOLUTION INTRAMUSCULAR; INTRAVENOUS at 09:56

## 2020-02-09 RX ADMIN — HEPARIN SODIUM 5000 UNITS: 5000 INJECTION, SOLUTION INTRAVENOUS; SUBCUTANEOUS at 21:38

## 2020-02-09 RX ADMIN — ISOSORBIDE MONONITRATE 60 MG: 60 TABLET, EXTENDED RELEASE ORAL at 08:30

## 2020-02-09 RX ADMIN — LATANOPROST 1 DROP: 50 SOLUTION OPHTHALMIC at 21:38

## 2020-02-09 NOTE — NURSING NOTE
Spoke with Dr. Appiah regarding patient's urine output, lab work, and DKA status. Per Dr. Appiah's recommendations, continue to run D5 1/2 NS at 50ml hr due to her renal function and low urine output vs volume infused.

## 2020-02-09 NOTE — PLAN OF CARE
Problem: Patient Care Overview  Goal: Plan of Care Review  Outcome: Ongoing (interventions implemented as appropriate)  Flowsheets (Taken 2/9/2020 1726)  Progress: improving  Plan of Care Reviewed With: patient  Note:   Pt given iv lasix today with decent results. She will be npo after MN for poss ashcath tmrw depending on labs. She rested well today, blood sugars trending up, ss ordered with q3 hr checks.

## 2020-02-09 NOTE — PROGRESS NOTES
" LOS: 1 day   Patient Care Team:  Santiago Kuhn MD as PCP - General (Internal Medicine)    Chief Complaint: SALOMÓN      Subjective     Metnation improving. Marked wheezing this morning. BG better. Renal function stable with no significant improvement with volume expansion. -600 in last 26 hr. I discussed with patient regarding poor kidney function and possibility of RRT. She is reluctant but open to the idea of HD if needed      Subjective:  Symptoms:  Worsening.  She reports shortness of breath.  No cough.    Diet:  Adequate intake.  No nausea or vomiting.        History taken from: patient family    Objective     Vital Sign Min/Max for last 24 hours  Temp  Min: 97.9 °F (36.6 °C)  Max: 98.7 °F (37.1 °C)   BP  Min: 119/83  Max: 194/105   Pulse  Min: 61  Max: 79   Resp  Min: 18  Max: 18   SpO2  Min: 99 %  Max: 100 %   No data recorded   Weight  Min: 66.7 kg (147 lb)  Max: 70.1 kg (154 lb 8 oz)     Flowsheet Rows      First Filed Value   Admission Height  157.5 cm (62\") Documented at 02/07/2020 2053   Admission Weight  63.5 kg (140 lb) Documented at 02/07/2020 2053          No intake/output data recorded.  I/O last 3 completed shifts:  In: 4263.6 [P.O.:240; I.V.:3023.6; IV Piggyback:1000]  Out: 400 [Urine:400]    Objective:  General Appearance:  Comfortable.    Vital signs: (most recent): Blood pressure (!) 194/105, pulse 74, temperature 98.5 °F (36.9 °C), temperature source Oral, resp. rate 18, height 157.5 cm (62\"), weight 70.1 kg (154 lb 8 oz), SpO2 100 %.    Output: Minimal urine output.    Lungs:  There are wheezes.    Heart: Normal rate.  Regular rhythm.  S1 normal and S2 normal.    Abdomen: Abdomen is soft and non-distended.  There are no signs of ascites.  Bowel sounds are normal.   There is no abdominal tenderness.     Neurological: Patient is alert and oriented to person, place and time.  Normal strength.    Pupils:  Pupils are equal, round, and reactive to light.    Skin:  Warm.              Results " Review:     I reviewed the patient's new clinical results.    WBC WBC   Date Value Ref Range Status   02/09/2020 10.16 3.40 - 10.80 10*3/mm3 Final   02/07/2020 15.23 (H) 3.40 - 10.80 10*3/mm3 Final      HGB Hemoglobin   Date Value Ref Range Status   02/09/2020 11.9 (L) 12.0 - 15.9 g/dL Final   02/07/2020 15.9 12.0 - 15.9 g/dL Final      HCT Hematocrit   Date Value Ref Range Status   02/09/2020 35.5 34.0 - 46.6 % Final   02/07/2020 48.2 (H) 34.0 - 46.6 % Final      Platlets No results found for: LABPLAT   MCV MCV   Date Value Ref Range Status   02/09/2020 83.7 79.0 - 97.0 fL Final   02/07/2020 83.8 79.0 - 97.0 fL Final          Sodium Sodium   Date Value Ref Range Status   02/09/2020 133 (L) 136 - 145 mmol/L Final   02/09/2020 136 136 - 145 mmol/L Final   02/08/2020 131 (L) 136 - 145 mmol/L Final   02/08/2020 132 (L) 136 - 145 mmol/L Final   02/08/2020 132 (L) 136 - 145 mmol/L Final   02/08/2020 128 (L) 136 - 145 mmol/L Final   02/07/2020 125 (L) 136 - 145 mmol/L Final   02/07/2020 126 (L) 136 - 145 mmol/L Final      Potassium Potassium   Date Value Ref Range Status   02/09/2020 3.9 3.5 - 5.2 mmol/L Final   02/09/2020 4.1 3.5 - 5.2 mmol/L Final   02/08/2020 4.7 3.5 - 5.2 mmol/L Final   02/08/2020 4.8 3.5 - 5.2 mmol/L Final   02/08/2020 4.8 3.5 - 5.2 mmol/L Final   02/08/2020 5.2 3.5 - 5.2 mmol/L Final   02/07/2020 5.8 (H) 3.5 - 5.2 mmol/L Final   02/07/2020 6.9 (C) 3.5 - 5.2 mmol/L Final     Comment:     Specimen hemolyzed.  Results may be affected.      Chloride Chloride   Date Value Ref Range Status   02/09/2020 100 98 - 107 mmol/L Final   02/09/2020 102 98 - 107 mmol/L Final   02/08/2020 99 98 - 107 mmol/L Final   02/08/2020 104 98 - 107 mmol/L Final   02/08/2020 99 98 - 107 mmol/L Final   02/08/2020 97 (L) 98 - 107 mmol/L Final   02/07/2020 91 (L) 98 - 107 mmol/L Final   02/07/2020 91 (L) 98 - 107 mmol/L Final      CO2 CO2   Date Value Ref Range Status   02/09/2020 18.0 (L) 22.0 - 29.0 mmol/L Final   02/09/2020  19.0 (L) 22.0 - 29.0 mmol/L Final   02/08/2020 14.0 (L) 22.0 - 29.0 mmol/L Final   02/08/2020 14.0 (L) 22.0 - 29.0 mmol/L Final   02/08/2020 14.0 (L) 22.0 - 29.0 mmol/L Final   02/08/2020 12.0 (L) 22.0 - 29.0 mmol/L Final   02/07/2020 13.0 (L) 22.0 - 29.0 mmol/L Final   02/07/2020 15.0 (L) 22.0 - 29.0 mmol/L Final      BUN BUN   Date Value Ref Range Status   02/09/2020 80 (H) 8 - 23 mg/dL Final   02/09/2020 81 (H) 8 - 23 mg/dL Final   02/08/2020 78 (H) 8 - 23 mg/dL Final   02/08/2020 80 (H) 8 - 23 mg/dL Final   02/08/2020 78 (H) 8 - 23 mg/dL Final   02/08/2020 78 (H) 8 - 23 mg/dL Final   02/07/2020 83 (H) 8 - 23 mg/dL Final   02/07/2020 86 (H) 8 - 23 mg/dL Final      Creatinine Creatinine   Date Value Ref Range Status   02/09/2020 5.09 (H) 0.57 - 1.00 mg/dL Final   02/09/2020 5.17 (H) 0.57 - 1.00 mg/dL Final   02/08/2020 5.35 (H) 0.57 - 1.00 mg/dL Final   02/08/2020 5.93 (H) 0.57 - 1.00 mg/dL Final   02/08/2020 5.77 (H) 0.57 - 1.00 mg/dL Final   02/08/2020 5.61 (H) 0.57 - 1.00 mg/dL Final   02/07/2020 6.25 (H) 0.57 - 1.00 mg/dL Final   02/07/2020 6.44 (H) 0.57 - 1.00 mg/dL Final      Calcium Calcium   Date Value Ref Range Status   02/09/2020 7.4 (L) 8.6 - 10.5 mg/dL Final   02/09/2020 7.6 (L) 8.6 - 10.5 mg/dL Final   02/08/2020 7.4 (L) 8.6 - 10.5 mg/dL Final   02/08/2020 7.4 (L) 8.6 - 10.5 mg/dL Final   02/08/2020 7.6 (L) 8.6 - 10.5 mg/dL Final   02/08/2020 7.2 (L) 8.6 - 10.5 mg/dL Final   02/07/2020 7.8 (L) 8.6 - 10.5 mg/dL Final   02/07/2020 8.4 (L) 8.6 - 10.5 mg/dL Final      PO4 No results found for: CAPO4   Albumin Albumin   Date Value Ref Range Status   02/07/2020 3.30 (L) 3.50 - 5.20 g/dL Final   02/07/2020 3.80 3.50 - 5.20 g/dL Final      Magnesium Magnesium   Date Value Ref Range Status   02/09/2020 2.0 1.6 - 2.4 mg/dL Final   02/07/2020 2.4 1.6 - 2.4 mg/dL Final      Uric Acid No results found for: URICACID     Medication Review: Yes    Assessment/Plan       Diabetic acidosis without coma (CMS/HCC)     Diabetes mellitus (CMS/[facility])    Hypertensive emergency    Diarrhea of presumed infectious origin    Essential hypertension    Acute renal failure (ARF) (CMS/[facility])    Type 2 diabetes mellitus with diabetic nephropathy, with long-term current use of insulin (CMS/[facility])    Hyponatremia    Glaucoma    Debility    Metabolic acidosis, increased anion gap    ATN (acute tubular necrosis) (CMS/[facility])      Assessment:  (Oliguric SALOMÓN   - Cr baseline 1.2mg/dl. On admission 6.6 mg/dl  - HTN crisis on admission. 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   - renal US showed 9.3mg right and 7.3cm left kidney no hydro  - UA trace protein large glucose    HTN: In crisis on admission. Could this be malignant HTN ? Possible.   Renal US showed asymmetric kidney left smaller than right   - Need renal doppler to rule out RANDI     Hyperkalemia: Resolved     Met acidosis: In the setting of severe SALOMÓN and DKA  Resolved     Hyponatremia: Better     Hyperglycemia: Concern for DKA on admission, acetone +   Requiried insulin gtt.        AMS: Likely metabolic encephalopath with HTN and hyperglycemia  Improved        ).      Plan:   - SALOMÓN likely due to hemodynamic injury with severe HTN and large variation in blood pressure. Avoid large variation in bp. Serological workup pending for RPGN   D/C all IV fluids   - Lasix 80 mg IV once ( For prognostication)  - Pending renal doppler.   - Strict I/O  - Serial BMP monitoring   - F/u w Serological workup including DANA w complete panel, ANCA, antiGBM, SPEP, Free light chain ratio  - Dose meds to eGFR<15  - High risk for requiring RRT if no significant improvement in renal function within 24-48 hr.) ).       Keith Appiah MD  02/09/20  10:06 AM

## 2020-02-09 NOTE — PROGRESS NOTES
[facility] Medicine Services  PROGRESS NOTE    Patient Name: Varinder Delgado  : 1931  MRN: [id number]    Date of Admission: 2020  Primary Care Physician: Santiago Kuhn MD    Subjective   Subjective     CC:  Follow-up renal failure and DKA    HPI:  Patient awoke this morning and denied any confusion.  She says she feels great.  She denies any shortness of breath.  She hopes that her kidneys will get better.  She has family member at the bedside.    Review of Systems  No current fevers or chills  No current shortness of breath or cough  No current nausea, vomiting, or diarrhea  No current chest pain or palpitations    Objective   Objective     Vital Signs:   Temp:  [97.9 °F (36.6 °C)-98.7 °F (37.1 °C)] 98.1 °F (36.7 °C)  Heart Rate:  [61-78] 67  Resp:  [18] 18  BP: (119-181)/() 161/102        Physical Exam:  Constitutional:Awake, alert  HENT: NCAT, mucous membranes moist, neck supple  Respiratory: Clear to auscultation bilaterally, respiratory effort normal, nonlabored breathing   Cardiovascular: RRR, S1, S2, normal radial pulses  Gastrointestinal: Positive bowel sounds, soft, nontender, nondistended  Musculoskeletal: Normal musculature for age, mild lower extremity edema, BMI 28  Psychiatric: Appropriate affect, cooperative, conversational  Neurologic: No slurred speech or facial droop, follows commands  Skin: No rashes or jaundice, warm      Results Reviewed:  Results from last 7 days   Lab Units 20  0405 20  1334   WBC 10*3/mm3 10.16 15.23* 9.28   HEMOGLOBIN g/dL 11.9* 15.9 14.2   HEMATOCRIT % 35.5 48.2* 43.4   PLATELETS 10*3/mm3 206 332 342     Results from last 7 days   Lab Units 20  0405 20  0006 20  1809  20  2300 20  1334   SODIUM mmol/L 133* 136 131*   < > 125* 126* 132*   POTASSIUM mmol/L 3.9 4.1 4.7   < > 5.8* 6.9* 5.5*   CHLORIDE mmol/L 100 102 99   < > 91* 91* 93*   CO2 mmol/L 18.0*  19.0* 14.0*   < > 13.0* 15.0* 21.9*   BUN mg/dL 80* 81* 78*   < > 83* 86* 33*   CREATININE mg/dL 5.09* 5.17* 5.35*   < > 6.25* 6.44* 1.66*   GLUCOSE mg/dL 125* 123* 227*   < > 522* 603* 548*   CALCIUM mg/dL 7.4* 7.6* 7.4*   < > 7.8* 8.4* 10.0   ALT (SGPT) U/L  --   --   --   --  9 14 14   AST (SGOT) U/L  --   --   --   --  16 40* 16   TROPONIN T ng/mL  --   --   --   --  0.029  --   --    PROBNP pg/mL  --   --   --   --   --   --  1,682.0    < > = values in this interval not displayed.     Estimated Creatinine Clearance: 7 mL/min (A) (by C-G formula based on SCr of 5.09 mg/dL (H)).    Microbiology Results Abnormal     Procedure Component Value - Date/Time    Eosinophil Smear - Urine, Urine, Clean Catch [[id number]]  (Normal) Collected:  02/08/20 0556    Lab Status:  Final result Specimen:  Urine, Clean Catch Updated:  02/08/20 0919     Eosinophil Smear 0 % EOS/100 Cells     Narrative:       No eosinophil seen          Imaging Results (Last 24 Hours)     Procedure Component Value Units Date/Time    US Renal Limited [[id number]] Collected:  02/08/20 1458     Updated:  02/08/20 1459    Narrative:       EXAMINATION: US RENAL LIMITED-     INDICATION: renal failure; E13.10-Other specified diabetes mellitus with  ketoacidosis without coma; I16.0-Hypertensive urgency; N19-Unspecified  kidney failure renal failure, renal insufficiency     TECHNIQUE: Sonographic imaging is obtained of the kidneys about the  sagittal and transverse planes.     COMPARISON: NONE     FINDINGS: Right kidney measures in length from portable 9.4 cm. No solid  cortical mass or renal cortical cysts seen within the right kidney.  Hydronephrosis or nephrolithiasis.     The left kidney is smaller in size measuring approximate 7.3 cm. There  is no solid cortical mass or renal cortical cysts seen within the left  kidney. Hydronephrosis interval lithiasis. Bladder is incompletely  distended and grossly unremarkable in appearance.          Impression:        Asymmetry seen in the size of the kidneys with the right  kidney being larger than the left. No gross abnormality seen within  either kidney.                 Results for orders placed during the hospital encounter of 10/22/18   Adult Transthoracic Echo Limited W/ Cont if Necessary Per Protocol    Narrative · This was a limited echocardiogram to assess left ventricular ejection   fraction.  · Left ventricular systolic function is normal. Estimated EF = 60%.  · Left ventricular wall thickness is consistent with moderate concentric   hypertrophy.          I have reviewed the medications:  Scheduled Meds:    amLODIPine 10 mg Oral Daily   aspirin 81 mg Oral Daily   carvedilol 6.25 mg Oral BID With Meals   heparin (porcine) 5,000 Units Subcutaneous Q12H   insulin lispro 0-9 Units Subcutaneous Q3H   isosorbide mononitrate 60 mg Oral Q24H   latanoprost 1 drop Right Eye Nightly   nystatin  Topical Q12H   povidone-iodine  Topical Daily   sodium chloride 3 mL Intravenous Q12H   timolol 1 drop Both Eyes Q12H     Continuous Infusions:    dextrose 5 % and sodium chloride 0.45 % 50 mL/hr Last Rate: 50 mL/hr (02/09/20 0247)   sodium chloride 10 mL/hr      PRN Meds:.•  acetaminophen  •  dextrose  •  dextrose  •  dextrose  •  glucagon (human recombinant)  •  labetalol  •  ondansetron  •  sodium chloride  •  sodium chloride  •  sodium chloride  •  sodium chloride    Assessment/Plan   Assessment & Plan     Active Hospital Problems    Diagnosis  POA   • Diabetic acidosis without coma (CMS/HCC) [E11.10]  Yes   • Diarrhea of presumed infectious origin [R19.7]  Yes   • Essential hypertension [I10]  Yes   • Acute renal failure (ARF) (CMS/HCC) [N17.9]  Yes   • Type 2 diabetes mellitus with diabetic nephropathy, with long-term current use of insulin (CMS/HCC) [E11.21, Z79.4]  Not Applicable   • Hyponatremia [E87.1]  Yes   • Glaucoma [H40.9]  Yes   • Debility [R53.81]  Yes   • Metabolic acidosis, increased anion gap [E87.2]  Yes   •  Hypertensive emergency [I16.1]  Yes   • Diabetes mellitus (CMS/[facility]) [E11.9]  Yes      Resolved Hospital Problems   No resolved problems to display.        Brief Hospital Course to date:  Varinder Delgado is a 88 y.o. female     Plan today:  Transition off insulin drip.  Start every 3 hour glucose checks with correction.  Add prandial insulin.  Initiate meal.  Continue dextrose drip for now per nephrology recommendations and defer further fluid adjustment to their service.    Diarrhea remains resolved.  Monitor for further episodes.    Discontinuing Cardene drip.  Labetalol as needed for high blood pressure.  Continue other medications.  Avoid rapid fluctuations and tolerate mildly elevated blood pressures.    Renal failure likely ATN but initially likely started from prerenal etiology.  Monitor urine output and renal function.  Monitor for uremia.    PT OT for debility.    Monitor sodium level.    Continue home glaucoma drops.    I have explained to patient at length that due to advanced age and severity of illness with comorbid conditions patient is at high risk for worsening morbidity or mortality.  She agrees with current treatment plan at this time.  Discussing code status but for now she wishes to stay full.    Chest x-ray image reviewed no infiltrate.  Renal ultrasound shows asymmetric kidneys.    Repeat laboratory studies tomorrow.      DVT Prophylaxis:   NALINI    Disposition: I expect the patient to be discharged location to be determined pending further improvement    CODE STATUS:   Code Status and Medical Interventions:   Ordered at: 02/08/20 0141     Code Status:    CPR     Medical Interventions (Level of Support Prior to Arrest):    Full         Electronically signed by Rubin Babcock MD, 02/09/20, 7:40 AM.

## 2020-02-09 NOTE — PLAN OF CARE
Problem: Patient Care Overview  Goal: Plan of Care Review  Outcome: Ongoing (interventions implemented as appropriate)  Flowsheets (Taken 2/9/2020 0444)  Progress: no change  Plan of Care Reviewed With: patient  Outcome Summary: Pt resting in bed. Unable to consistently follow DKA protocol due to patient's kidney function. Both hospitilist and Nephrology aware. Consulted with Dr. Td rm regarding patient's lab work and urine output. Recommened lowering the rate of fluid being administered. D5 1/2 NS infusing at 50ml hour currently. Insulin drip stopped around 22:30, and Blood sugars maintained between 100-120 throughout shift. Skin care provided. Plan of care discussed with pt. Verbalized understanding. Will continue to monitor.  Goal: Individualization and Mutuality  Outcome: Ongoing (interventions implemented as appropriate)  Goal: Discharge Needs Assessment  Outcome: Ongoing (interventions implemented as appropriate)  Goal: Interprofessional Rounds/Family Conf  Outcome: Ongoing (interventions implemented as appropriate)     Problem: Fall Risk (Adult)  Goal: Identify Related Risk Factors and Signs and Symptoms  Outcome: Ongoing (interventions implemented as appropriate)  Flowsheets (Taken 2/9/2020 0444)  Related Risk Factors (Fall Risk): gait/mobility problems; fatigue/slow reaction; homeostatic imbalance  Signs and Symptoms (Fall Risk): presence of risk factors  Goal: Absence of Fall  Outcome: Ongoing (interventions implemented as appropriate)  Flowsheets (Taken 2/9/2020 0444)  Absence of Fall: making progress toward outcome     Problem: Skin Injury Risk (Adult)  Goal: Identify Related Risk Factors and Signs and Symptoms  Outcome: Ongoing (interventions implemented as appropriate)  Flowsheets (Taken 2/9/2020 0444)  Related Risk Factors (Skin Injury Risk): advanced age; fluid intake inadequate; nutritional deficiencies  Goal: Skin Health and Integrity  Outcome: Ongoing (interventions implemented as  appropriate)  Flowsheets (Taken 2/9/2020 0444)  Skin Health and Integrity: making progress toward outcome     Problem: Diabetes, Type 2 (Adult)  Goal: Signs and Symptoms of Listed Potential Problems Will be Absent, Minimized or Managed (Diabetes, Type 2)  Outcome: Ongoing (interventions implemented as appropriate)  Flowsheets (Taken 2/9/2020 7524)  Problems Assessed (Type 2 Diabetes): all  Problems Present (Type 2 Diabetes): DKA (diabetic ketoacidosis)/HHS (hyperosmolar hyperglycemic state); hyperglycemia     Problem: Hypertensive Disease/Crisis (Arterial) (Adult)  Goal: Signs and Symptoms of Listed Potential Problems Will be Absent, Minimized or Managed (Hypertensive Disease/Crisis)  Outcome: Ongoing (interventions implemented as appropriate)  Flowsheets (Taken 2/9/2020 9586)  Problems Assessed (Hypertensive Disease/Crisis (Arterial)): all  Problems Present (Hypertensive Disease): renal dysfunction

## 2020-02-10 PROBLEM — I70.1 RENAL ARTERY STENOSIS (HCC): Status: ACTIVE | Noted: 2020-02-10

## 2020-02-10 LAB
ANA SER QL: NEGATIVE
ANION GAP SERPL CALCULATED.3IONS-SCNC: 15 MMOL/L (ref 5–15)
BH CV ECHO MEAS - PROX REN A EDV LEFT: 15.2 CM/S
BH CV ECHO MEAS - PROX REN A PSV LEFT: 118 CM/S
BH CV VAS BP LEFT ARM: NORMAL MMHG
BH CV VAS RENAL AORTIC MID EDV: 9.6 CM/S
BH CV VAS RENAL AORTIC MID PSV: 61.6 CM/S
BH CV XLRA MEAS KID H RIGHT: 4.49 CM
BH CV XLRA MEAS KID L RIGHT: 10.2 CM
BH CV XLRA MEAS KID W RIGHT: 4.76 CM
BH CV XLRA MEAS MID REN A PSV RIGHT: 105 CM/S
BH CV XLRA MEAS PROX REN A EDV RIGHT: 23.2 CM/S
BH CV XLRA MEAS PROX REN A PSV RIGHT: 137 CM/S
BH CV XLRA MEAS RENAL A ORG EDV LEFT: 22.8 CM/S
BH CV XLRA MEAS RENAL A ORG EDV RIGHT: 23.2 CM/S
BH CV XLRA MEAS RENAL A ORG PSV LEFT: 106 CM/S
BH CV XLRA MEAS RENAL A ORG PSV RIGHT: 166 CM/S
BUN BLD-MCNC: 83 MG/DL (ref 8–23)
BUN/CREAT SERPL: 17.6 (ref 7–25)
CALCIUM SPEC-SCNC: 8.2 MG/DL (ref 8.6–10.5)
CHLORIDE SERPL-SCNC: 101 MMOL/L (ref 98–107)
CO2 SERPL-SCNC: 19 MMOL/L (ref 22–29)
CREAT BLD-MCNC: 4.71 MG/DL (ref 0.57–1)
DEPRECATED RDW RBC AUTO: 41.8 FL (ref 37–54)
ERYTHROCYTE [DISTWIDTH] IN BLOOD BY AUTOMATED COUNT: 13.8 % (ref 12.3–15.4)
GFR SERPL CREATININE-BSD FRML MDRD: 11 ML/MIN/1.73
GFR SERPL CREATININE-BSD FRML MDRD: ABNORMAL ML/MIN/{1.73_M2}
GLUCOSE BLD-MCNC: 103 MG/DL (ref 65–99)
GLUCOSE BLDC GLUCOMTR-MCNC: 105 MG/DL (ref 70–130)
GLUCOSE BLDC GLUCOMTR-MCNC: 113 MG/DL (ref 70–130)
GLUCOSE BLDC GLUCOMTR-MCNC: 116 MG/DL (ref 70–130)
GLUCOSE BLDC GLUCOMTR-MCNC: 140 MG/DL (ref 70–130)
GLUCOSE BLDC GLUCOMTR-MCNC: 249 MG/DL (ref 70–130)
GLUCOSE BLDC GLUCOMTR-MCNC: 363 MG/DL (ref 70–130)
GLUCOSE BLDC GLUCOMTR-MCNC: 381 MG/DL (ref 70–130)
GLUCOSE BLDC GLUCOMTR-MCNC: 67 MG/DL (ref 70–130)
HCT VFR BLD AUTO: 35.2 % (ref 34–46.6)
HGB BLD-MCNC: 12 G/DL (ref 12–15.9)
KAPPA LC SERPL-MCNC: 129.7 MG/L (ref 3.3–19.4)
KAPPA LC/LAMBDA SER: 2.57 {RATIO} (ref 0.26–1.65)
LAMBDA LC FREE SERPL-MCNC: 50.4 MG/L (ref 5.7–26.3)
MCH RBC QN AUTO: 28.3 PG (ref 26.6–33)
MCHC RBC AUTO-ENTMCNC: 34.1 G/DL (ref 31.5–35.7)
MCV RBC AUTO: 83 FL (ref 79–97)
PLATELET # BLD AUTO: 217 10*3/MM3 (ref 140–450)
PMV BLD AUTO: 10.8 FL (ref 6–12)
POTASSIUM BLD-SCNC: 3.9 MMOL/L (ref 3.5–5.2)
RBC # BLD AUTO: 4.24 10*6/MM3 (ref 3.77–5.28)
SODIUM BLD-SCNC: 135 MMOL/L (ref 136–145)
WBC NRBC COR # BLD: 14.06 10*3/MM3 (ref 3.4–10.8)

## 2020-02-10 PROCEDURE — 94640 AIRWAY INHALATION TREATMENT: CPT

## 2020-02-10 PROCEDURE — 63710000001 INSULIN LISPRO (HUMAN) PER 5 UNITS: Performed by: INTERNAL MEDICINE

## 2020-02-10 PROCEDURE — 85027 COMPLETE CBC AUTOMATED: CPT | Performed by: INTERNAL MEDICINE

## 2020-02-10 PROCEDURE — 25010000002 HEPARIN (PORCINE) PER 1000 UNITS: Performed by: INTERNAL MEDICINE

## 2020-02-10 PROCEDURE — 97166 OT EVAL MOD COMPLEX 45 MIN: CPT

## 2020-02-10 PROCEDURE — 82962 GLUCOSE BLOOD TEST: CPT

## 2020-02-10 PROCEDURE — 97162 PT EVAL MOD COMPLEX 30 MIN: CPT

## 2020-02-10 PROCEDURE — 94799 UNLISTED PULMONARY SVC/PX: CPT

## 2020-02-10 PROCEDURE — 99233 SBSQ HOSP IP/OBS HIGH 50: CPT | Performed by: INTERNAL MEDICINE

## 2020-02-10 PROCEDURE — 80048 BASIC METABOLIC PNL TOTAL CA: CPT | Performed by: INTERNAL MEDICINE

## 2020-02-10 PROCEDURE — 25010000002 FUROSEMIDE PER 20 MG: Performed by: INTERNAL MEDICINE

## 2020-02-10 RX ORDER — CARVEDILOL 12.5 MG/1
12.5 TABLET ORAL 2 TIMES DAILY WITH MEALS
Status: DISCONTINUED | OUTPATIENT
Start: 2020-02-10 | End: 2020-02-14 | Stop reason: HOSPADM

## 2020-02-10 RX ORDER — FUROSEMIDE 10 MG/ML
40 INJECTION INTRAMUSCULAR; INTRAVENOUS ONCE
Status: COMPLETED | OUTPATIENT
Start: 2020-02-10 | End: 2020-02-10

## 2020-02-10 RX ORDER — FAMOTIDINE 20 MG/1
20 TABLET, FILM COATED ORAL 2 TIMES DAILY PRN
Status: DISCONTINUED | OUTPATIENT
Start: 2020-02-10 | End: 2020-02-14 | Stop reason: HOSPADM

## 2020-02-10 RX ORDER — CHOLECALCIFEROL (VITAMIN D3) 125 MCG
5 CAPSULE ORAL NIGHTLY PRN
Status: DISCONTINUED | OUTPATIENT
Start: 2020-02-10 | End: 2020-02-14 | Stop reason: HOSPADM

## 2020-02-10 RX ORDER — IPRATROPIUM BROMIDE AND ALBUTEROL SULFATE 2.5; .5 MG/3ML; MG/3ML
3 SOLUTION RESPIRATORY (INHALATION)
Status: DISCONTINUED | OUTPATIENT
Start: 2020-02-10 | End: 2020-02-14 | Stop reason: HOSPADM

## 2020-02-10 RX ADMIN — INSULIN LISPRO 5 UNITS: 100 INJECTION, SOLUTION INTRAVENOUS; SUBCUTANEOUS at 21:52

## 2020-02-10 RX ADMIN — CARVEDILOL 6.25 MG: 6.25 TABLET, FILM COATED ORAL at 08:37

## 2020-02-10 RX ADMIN — LATANOPROST 1 DROP: 50 SOLUTION OPHTHALMIC at 21:52

## 2020-02-10 RX ADMIN — LABETALOL 20 MG/4 ML (5 MG/ML) INTRAVENOUS SYRINGE 10 MG: at 22:40

## 2020-02-10 RX ADMIN — TIMOLOL MALEATE 1 DROP: 5 SOLUTION/ DROPS OPHTHALMIC at 11:58

## 2020-02-10 RX ADMIN — MELATONIN TAB 5 MG 5 MG: 5 TAB at 21:52

## 2020-02-10 RX ADMIN — LABETALOL 20 MG/4 ML (5 MG/ML) INTRAVENOUS SYRINGE 10 MG: at 17:48

## 2020-02-10 RX ADMIN — NYSTATIN: 100000 POWDER TOPICAL at 08:40

## 2020-02-10 RX ADMIN — POVIDONE-IODINE: 10 SOLUTION TOPICAL at 11:58

## 2020-02-10 RX ADMIN — INSULIN LISPRO 12 UNITS: 100 INJECTION, SOLUTION INTRAVENOUS; SUBCUTANEOUS at 17:50

## 2020-02-10 RX ADMIN — HEPARIN SODIUM 5000 UNITS: 5000 INJECTION, SOLUTION INTRAVENOUS; SUBCUTANEOUS at 08:38

## 2020-02-10 RX ADMIN — NYSTATIN: 100000 POWDER TOPICAL at 22:37

## 2020-02-10 RX ADMIN — AMLODIPINE BESYLATE 10 MG: 10 TABLET ORAL at 08:38

## 2020-02-10 RX ADMIN — SODIUM CHLORIDE, PRESERVATIVE FREE 10 ML: 5 INJECTION INTRAVENOUS at 08:38

## 2020-02-10 RX ADMIN — SODIUM CHLORIDE, PRESERVATIVE FREE 10 ML: 5 INJECTION INTRAVENOUS at 22:40

## 2020-02-10 RX ADMIN — HEPARIN SODIUM 5000 UNITS: 5000 INJECTION, SOLUTION INTRAVENOUS; SUBCUTANEOUS at 21:52

## 2020-02-10 RX ADMIN — IPRATROPIUM BROMIDE AND ALBUTEROL SULFATE 3 ML: 2.5; .5 SOLUTION RESPIRATORY (INHALATION) at 17:45

## 2020-02-10 RX ADMIN — TIMOLOL MALEATE 1 DROP: 5 SOLUTION/ DROPS OPHTHALMIC at 21:52

## 2020-02-10 RX ADMIN — ASPIRIN 81 MG CHEWABLE TABLET 81 MG: 81 TABLET CHEWABLE at 08:37

## 2020-02-10 RX ADMIN — ISOSORBIDE MONONITRATE 60 MG: 60 TABLET, EXTENDED RELEASE ORAL at 08:37

## 2020-02-10 RX ADMIN — LABETALOL 20 MG/4 ML (5 MG/ML) INTRAVENOUS SYRINGE 10 MG: at 04:17

## 2020-02-10 RX ADMIN — IPRATROPIUM BROMIDE AND ALBUTEROL SULFATE 3 ML: 2.5; .5 SOLUTION RESPIRATORY (INHALATION) at 21:09

## 2020-02-10 RX ADMIN — CARVEDILOL 12.5 MG: 12.5 TABLET, FILM COATED ORAL at 17:55

## 2020-02-10 RX ADMIN — FUROSEMIDE 40 MG: 10 INJECTION, SOLUTION INTRAMUSCULAR; INTRAVENOUS at 17:55

## 2020-02-10 NOTE — CONSULTS
"Clinical Nutrition   Reason For Visit: Admission assessment, Physician consult, MST score 2+, Unintentional weight loss    Patient Name: Varinder Delgado  YOB: 1931  MRN: [id number]  Date of Encounter: 02/10/20 2:35 PM  Admission date: 2/7/2020    Nutrition Assessment     Admission Problem List:  Diarrhea (resolved)  N/V  DKA  Dehydration  Accelerated HTN  SALOMÓN      Applicable PMH:  HTN, HLD  CAD  T2DM on insulin    Reported/Observed/Food/Nutrition Related History   Pt seen this morning. She was NPO at time of visit, diet order has since been placed. She reports that she was able to eat pretty well yesterday. Insulin drip has been discontinue and pt is now having insulin administered at meal times. She thinks she would benefit from a supplement at this time until PO returns to baseline.     Anthropometrics   Height: 62 in  Weight: 140 lbs (KATHY 2/3 per EMR)  BMI: 25.6  BMI classification: Overweight: 25.0-29.9kg/m2    IBW: 110 lbs    Labs reviewed   Labs reviewed: Yes  Hyponatremia   Hyperphosphatemia     Medications reviewed   Medications reviewed: Yes  Insulin  PRN: [email or web address]     Current Nutrition Prescription   PO: Diet Regular; Consistent Carbohydrate, Cardiac, Renal    Evaluation of Received Nutrient/Fluid Intake: insufficient data         75% of 1 meal    Nutrition Diagnosis   2/8; updated 2/10  Problem Inadequate oral intake   Etiology Decreased appetite; diet order   Signs/Symptoms Patient's niece reports that patient has been eating noticeably less \"for a while now\";75% of 1 meal    Status: ongoing     2/8  Problem Food and nutrition knowledge deficit   Etiology Non-compliance versus lack of education (to be determined)   Signs/Symptoms HgbA1c = 12.9 (2/7/20)   Status: ongoing; pt needs to have education when son is present.     Intervention   Intervention: Follow treatment progress, Care plan reviewed, Interview for preferences, Encourage intake   -[facility] Renal Daily with Dinner      Goal: "   General: Nutrition support treatment  PO: Increase intake, Continue positive trend    Monitoring/Evaluation:       Monitoring/Evaluation: Per protocol, I&O, PO intake, Supplement intake, Pertinent labs, Weight, Symptoms  Will Continue to follow per protocol  Sonja Lopez RD  Time Spent: 20 min

## 2020-02-10 NOTE — PLAN OF CARE
Problem: Patient Care Overview  Goal: Plan of Care Review  Outcome: Ongoing (interventions implemented as appropriate)  Flowsheets (Taken 2/10/2020 8681)  Outcome Summary: PT eval complete.  Pt presents with weakness and functional mobility decline warranting skilled IPPT services.  Pt require min A for supine to sit.  MAx A for scooting in sitting.  CGA with RW for transfers.  Pt ambulated 10' with RW and min A of 2.  Recommend DC to IRF when appropriate

## 2020-02-10 NOTE — THERAPY EVALUATION
Acute Care - Occupational Therapy Initial Evaluation  [facility]     Patient Name: Varinder Delgado  : 1931  MRN: [id number]  Today's Date: 2/10/2020     Date of Referral to OT: 20       Admit Date: 2020       ICD-10-CM ICD-9-CM   1. Diabetic ketoacidosis without coma associated with other specified diabetes mellitus (CMS/HCC) E13.10 250.12   2. Hypertensive urgency I16.0 401.9   3. Renal failure, unspecified chronicity N19 586   4. Impaired mobility and ADLs Z74.09 799.89     Patient Active Problem List   Diagnosis   • Essential hypertension   • Diabetes mellitus (CMS/[facility])   • Mixed hyperlipidemia   • Tobacco abuse   • Hypertensive emergency   • Coronary artery disease involving native coronary artery of native heart without angina pectoris   • Claudication (CMS/[facility])   • Lower extremity edema   • Diabetic acidosis without coma (CMS/[facility])   • Diarrhea of presumed infectious origin   • Essential hypertension   • Acute renal failure (ARF) (CMS/[facility])   • Type 2 diabetes mellitus with diabetic nephropathy, with long-term current use of insulin (CMS/[facility])   • Hyponatremia   • Glaucoma   • Debility   • Metabolic acidosis, increased anion gap   • ATN (acute tubular necrosis) (CMS/[facility])   • Renal artery stenosis, right     Past Medical History:   Diagnosis Date   • Diabetes mellitus (CMS/[facility])    • Hyperlipidemia    • Hypertension    • MI (myocardial infarction) (CMS/[facility])      Past Surgical History:   Procedure Laterality Date   • CARDIAC CATHETERIZATION N/A 2018    Procedure: Left Heart Cath;  Surgeon: Sreekanth De La Rosa MD;  Location: [facility] INVASIVE LOCATION;  Service: Cardiology   • MOUTH SURGERY            OT ASSESSMENT FLOWSHEET (last 12 hours)      Occupational Therapy Evaluation     Row Name 02/10/20 0945                   OT Evaluation Time/Intention    Subjective Information  complains of;weakness;fatigue  -KF        Document Type  evaluation  -KF        Mode of Treatment  occupational therapy   -KF        Patient Effort  good  -KF        Symptoms Noted During/After Treatment  fatigue  -KF           General Information    Patient Profile Reviewed?  yes  -KF        Patient Observations  alert;cooperative;agree to therapy  -KF        Prior Level of Function  independent:;all household mobility;community mobility;transfer;bed mobility;ADL's;home management;cooking;driving;cleaning  -KF        Equipment Currently Used at Home  none  -KF        Existing Precautions/Restrictions  fall  -KF        Limitations/Impairments  safety/cognitive  -KF        Risks Reviewed  patient and family:;LOB;nausea/vomiting;dizziness;increased discomfort;change in vital signs  -KF        Benefits Reviewed  patient and family:;increase independence;improve function;increase strength;increase balance;decrease pain;increase knowledge  -KF        Barriers to Rehab  none identified  -KF           Relationship/Environment    Primary Source of Support/Comfort  child(smiley)  -KF        Lives With  alone  -KF        Family Caregiver if Needed  child(smiley), adult;other relative(s)  -KF        Family Caregiver Names  -- niece checks on as well   -KF           Resource/Environmental Concerns    Current Living Arrangements  home/apartment/condo  -KF           Cognitive Assessment/Interventions    Additional Documentation  Cognitive Assessment/Intervention (Group)  -KF           Cognitive Assessment/Intervention- PT/OT    Affect/Mental Status (Cognitive)  WFL;confused intermittent confusion   -KF        Orientation Status (Cognition)  oriented to;person;place;verbal cues/prompts needed for orientation;time  -KF        Follows Commands (Cognition)  WFL;follows one step commands;over 90% accuracy;repetition of directions required;verbal cues/prompting required  -KF        Cognitive Function (Cognitive)  executive function deficit;safety deficit  -KF        Executive Function Deficit (Cognition)  mild deficit;insight/awareness of deficits;information  processing;organization/sequencing  -KF        Safety Deficit (Cognitive)  mild deficit;judgment;insight into deficits/self awareness;awareness of need for assistance;safety precautions awareness  -KF        Cognitive Interventions (Cognitive)  occupation/activity based interventions;process/task specific training  -KF           Safety Issues, Functional Mobility    Safety Issues Affecting Function (Mobility)  safety precaution awareness;sequencing abilities;insight into deficits/self awareness;judgment  -KF        Impairments Affecting Function (Mobility)  balance;cognition;endurance/activity tolerance;shortness of breath;strength  -KF           Bed Mobility Assessment/Treatment    Bed Mobility Assessment/Treatment  rolling right;scooting/bridging;supine-sit  -KF        Rolling Right [place] (Bed Mobility)  minimum assist (75% patient effort)  -KF        Scooting/Bridging [place] (Bed Mobility)  maximum assist (25% patient effort)  -KF        Supine-Sit [place] (Bed Mobility)  moderate assist (50% patient effort);2 person assist;verbal cues;nonverbal cues (demo/gesture)  -KF        Bed Mobility, Safety Issues  decreased use of arms for pushing/pulling;decreased use of legs for bridging/pushing  -KF        Assistive Device (Bed Mobility)  bed rails;draw sheet;head of bed elevated  -KF           Functional Mobility    Functional Mobility- Comment  deferred to PT, sidesteps min A x2 for FWW   -KF           Transfer Assessment/Treatment    Transfer Assessment/Treatment  sit-stand transfer;stand-sit transfer  -KF        Comment (Transfers)  cues for HP and seq   -KF           Sit-Stand Transfer    Sit-Stand [place] (Transfers)  contact guard;2 person assist  -KF        Assistive Device (Sit-Stand Transfers)  walker, front-wheeled  -KF           Stand-Sit Transfer    Stand-Sit [place] (Transfers)  contact guard;verbal cues;2 person assist  -KF        Assistive Device (Stand-Sit Transfers)   walker, front-wheeled  -KF           ADL Assessment/Intervention    BADL Assessment/Intervention  upper body dressing;lower body dressing;toileting;grooming  -KF           Upper Body Dressing Assessment/Training    Upper Body Dressing [place] Level  don;doff;front opening garment;minimum assist (75% patient effort);verbal cues;nonverbal cues (demo/gesture)  -KF        Upper Body Dressing Position  edge of bed sitting  -KF           Lower Body Dressing Assessment/Training    Lower Body Dressing [place] Level  don;socks;maximum assist (25% patient effort)  -KF        Lower Body Dressing Position  long sitting;sitting up in bed  -KF           Grooming Assessment/Training    [place] Level (Grooming)  wash face, hands;set up;supervision;verbal cues  -KF        Grooming Position  edge of bed sitting  -KF           Toileting Assessment/Training    [place] Level (Toileting)  adjust/manage clothing;perform perineal hygiene;maximum assist (25% patient effort);verbal cues;nonverbal cues (demo/gesture)  -KF        Toileting Position  supported standing  -KF        Comment (Toileting)  attempted, req cues for seq and not actually able to reach to wipe thoroughly and unaware of   -KF           BADL Safety/Performance    Impairments, BADL Safety/Performance  balance;cognition;endurance/activity tolerance;strength;trunk/postural control;range of motion  -KF        Cognitive Impairments, BADL Safety/Performance  insight into deficits/self awareness;judgment;problem solving/reasoning;safety precaution awareness;sequencing abilities  -KF        Skilled BADL Treatment/Intervention  adaptive equipment training;BADL process/adaptation training;cognitive/safety deficit modifications;compensatory training  -KF           General ROM    GENERAL ROM COMMENTS  BUE WFL   -KF           MMT (Manual Muscle Testing)    General MMT Comments  BUE  grossly 4-/5   -KF           Motor Assessment/Interventions    Additional  Documentation  Balance (Group);Balance Interventions (Group);Gross Motor Coordination (Group)  -KF           Gross Motor Coordination    Gross Motor Impairments  finger to nose;coordination  -KF        Gross Motor Skill, Impairments Detail  BUE WFL   -KF           Balance    Balance  static sitting balance;static standing balance;dynamic sitting balance;dynamic standing balance  -KF           Static Sitting Balance    Level of [place] (Unsupported Sitting, Static Balance)  supervision  -KF        Sitting Position (Unsupported Sitting, Static Balance)  sitting on edge of bed  -KF        Time Able to Maintain Position (Unsupported Sitting, Static Balance)  more than 5 minutes  -KF           Dynamic Sitting Balance    Level of [place], Reaches Outside Midline (Sitting, Dynamic Balance)  standby assist  -KF        Sitting Position, Reaches Outside Midline (Sitting, Dynamic Balance)  sitting on edge of bed  -KF           Static Standing Balance    Level of [place] (Supported Standing, Static Balance)  contact guard assist  -KF        Time Able to Maintain Position (Supported Standing, Static Balance)  1 to 2 minutes  -KF        Assistive Device Utilized (Supported Standing, Static Balance)  walker, rolling  -KF           Dynamic Standing Balance    Level of [place], Reaches Outside Midline (Standing, Dynamic Balance)  contact guard assist;2 person assist  -KF        Time Able to Maintain Position, Reaches Outside Midline (Standing, Dynamic Balance)  1 to 2 minutes  -KF        Assistive Device Utilized (Supported Standing, Dynamic Balance)  walker, rolling  -KF           Sensory Assessment/Intervention    Sensory General Assessment  no sensation deficits identified  -KF           Positioning and Restraints    Pre-Treatment Position  in bed  -KF        Post Treatment Position  chair  -KF        In Chair  notified nsg;reclined;sitting;call light within reach;encouraged to call for assist;exit alarm  on;with PT;with family/caregiver;legs elevated;waffle cushion  -KF           Pain Assessment    Additional Documentation  Pain Scale: Numbers Pre/Post-Treatment (Group)  -KF           Pain Scale: Numbers Pre/Post-Treatment    Pain Scale: Numbers, Pretreatment  0/10 - no pain  -KF        Pain Scale: Numbers, Post-Treatment  0/10 - no pain  -KF        Pain Intervention(s)  Repositioned;Ambulation/increased activity  -KF           Wound 02/08/20 1245 Right medial heel Fissure    Wound - Properties Group Date first assessed: 02/08/20  -CP Time first assessed: 1245  -CP Present on Hospital Admission: Y  -CP Side: Right  -CP Orientation: medial  -CP Location: heel  -CP Primary Wound Type: Fissure  -CP       Clinical Impression (OT)    Date of Referral to OT  02/08/20  -KF        OT Diagnosis  ADL decline   -KF        Patient/Family Goals Statement (OT Eval)  PLOF   -KF        Criteria for Skilled Therapeutic Interventions Met (OT Eval)  yes;treatment indicated  -KF        Rehab Potential (OT Eval)  good, to achieve stated therapy goals  -KF        Therapy Frequency (OT Eval)  daily  -KF        Care Plan Review (OT)  evaluation/treatment results reviewed;care plan/treatment goals reviewed;risks/benefits reviewed;current/potential barriers reviewed;patient/other agree to care plan  -KF        Care Plan Review, Other Participant (OT Eval)  son  -KF        Anticipated Equipment Needs at Discharge (OT)  front wheeled walker  -KF        Anticipated Discharge Disposition (OT)  inpatient rehabilitation facility  -KF           Vital Signs    Pre Systolic BP Rehab  172  -KF        Pre Treatment Diastolic BP  75 RN cleared VSS   -KF        Pre SpO2 (%)  94  -KF        O2 Delivery Pre Treatment  room air  -KF        Post SpO2 (%)  94  -KF        O2 Delivery Post Treatment  room air  -KF        Pre Patient Position  Supine  -KF        Intra Patient Position  Standing  -KF        Post Patient Position  Sitting  -KF        Rest Breaks    1  -KF           Planned OT Interventions    Planned Therapy Interventions (OT Eval)  activity tolerance training;adaptive equipment training;BADL retraining;cognitive/visual perception retraining;edema control/reduction;functional balance retraining;IADL retraining;neuromuscular control/coordination retraining;occupation/activity based interventions;patient/caregiver education/training;ROM/therapeutic exercise;strengthening exercise;transfer/mobility retraining  -KF           OT Goals    Bed Mobility Goal Selection (OT)  bed mobility, OT goal 1  -KF        Transfer Goal Selection (OT)  transfer, OT goal 1  -KF        Dressing Goal Selection (OT)  dressing, OT goal 1  -KF        Toileting Goal Selection (OT)  toileting, OT goal 1  -KF           Bed Mobility Goal 1 (OT)    Activity/Assistive Device (Bed Mobility Goal 1, OT)  sit to supine/supine to sit  -KF        [place] Level/Cues Needed (Bed Mobility Goal 1, OT)  contact guard assist;verbal cues required  -KF        Time Frame (Bed Mobility Goal 1, OT)  long term goal (LTG);by discharge  -KF        Progress/Outcomes (Bed Mobility Goal 1, OT)  goal ongoing  -KF           Transfer Goal 1 (OT)    Activity/Assistive Device (Transfer Goal 1, OT)  bed-to-chair/chair-to-bed;commode;walker, rolling BSC over commode for elevation and rails  -KF        [place] Level/Cues Needed (Transfer Goal 1, OT)  contact guard assist;verbal cues required  -KF        Time Frame (Transfer Goal 1, OT)  long term goal (LTG);by discharge  -KF        Progress/Outcome (Transfer Goal 1, OT)  goal ongoing  -KF           Dressing Goal 1 (OT)    Activity/Assistive Device (Dressing Goal 1, OT)  lower body dressing socks AE prn  -KF        [place]/Cues Needed (Dressing Goal 1, OT)  minimum assist (75% or more patient effort)  -KF        Time Frame (Dressing Goal 1, OT)  long term goal (LTG);by discharge  -KF        Progress/Outcome (Dressing Goal 1, OT)  goal ongoing  -KF            Toileting Goal 1 (OT)    Activity/Device (Toileting Goal 1, OT)  commode;commode, bedside without drop arms;grab bar/safety frame;adjust/manage clothing;perform perineal hygiene  -KF        [place] Level/Cues Needed (Toileting Goal 1, OT)  contact guard assist;verbal cues required  -KF        Time Frame (Toileting Goal 1, OT)  long term goal (LTG);by discharge  -KF        Progress/Outcome (Toileting Goal 1, OT)  goal ongoing  -KF           Living Environment    Home Accessibility  tub/shower is not walk in  -KF          User Key  (r) = Recorded By, (t) = Taken By, (c) = Cosigned By    Initials Name Effective Dates    CP Valeri Pierce, APRN 07/24/19 -     KF Yris Lund, OT 04/03/18 -                OT Recommendation and Plan  Outcome Summary/Treatment Plan (OT)  Anticipated Equipment Needs at Discharge (OT): front wheeled walker  Anticipated Discharge Disposition (OT): inpatient rehabilitation facility  Planned Therapy Interventions (OT Eval): activity tolerance training, adaptive equipment training, BADL retraining, cognitive/visual perception retraining, edema control/reduction, functional balance retraining, IADL retraining, neuromuscular control/coordination retraining, occupation/activity based interventions, patient/caregiver education/training, ROM/therapeutic exercise, strengthening exercise, transfer/mobility retraining  Therapy Frequency (OT Eval): daily  Plan of Care Review  Plan of Care Reviewed With: patient, son  Plan of Care Reviewed With: patient, son  Outcome Summary: OT eval completed Pt presents with defictis in strength, activity tolerance, balance, and sequencing for ADL completion compared to baseline, recom IPOT d/c IRF    Outcome Measures     Row Name 02/10/20 0945             How much help from another is currently needed...    Putting on and taking off regular lower body clothing?  2  -KF      Bathing (including washing, rinsing, and drying)  2  -KF      Toileting (which  includes using toilet bed pan or urinal)  2  -KF      Putting on and taking off regular upper body clothing  3  -KF      Taking care of personal grooming (such as brushing teeth)  3  -KF      Eating meals  4  -KF      AM-PAC 6 Clicks Score (OT)  16  -KF         Functional Assessment    Outcome Measure Options  AM-PAC 6 Clicks Daily Activity (OT)  -KF        User Key  (r) = Recorded By, (t) = Taken By, (c) = Cosigned By    Initials Name Provider Type    Yris Garcia OT Occupational Therapist          Time Calculation:   Time Calculation- OT     Row Name 02/10/20 0945             Time Calculation- OT    OT Start Time  0945  -KF      Total Timed Code Minutes- OT  0 minute(s)  -KF      OT Received On  02/10/20  -KF      OT Goal Re-Cert Due Date  02/20/20  -KF        User Key  (r) = Recorded By, (t) = Taken By, (c) = Cosigned By    Initials Name Provider Type    Yris Garcia OT Occupational Therapist        Therapy Charges for Today     Code Description Service Date Service Provider Modifiers Qty    [id number]  OT EVAL MOD COMPLEXITY 4 2/10/2020 Yris Lund OT GO 1               Yris Lund OT  2/10/2020

## 2020-02-10 NOTE — PROGRESS NOTES
Discharge Planning Assessment  [facility]     Patient Name: Varinder Delgado  MRN: [id number]  Today's Date: 2/10/2020    Admit Date: 2/7/2020    Discharge Needs Assessment    No documentation.       Discharge Plan     Row Name 02/10/20 1548       Plan    Plan  SNF    Provided post acute provider list?  Yes    Post Acute Provider List  Nursing Home    Delivered To  Patient    Method of Delivery  In person    Plan Comments  Met with pt at bedside to f/u DCP.  Discussed therapy recs for STR.  She is agreeable.  Per request, CM called referrals to [facility] and [facility].  Will need insurance precert.  CM will cont to follow.    Final Discharge Disposition Code  03 - skilled nursing facility (SNF)        Destination      Service Provider Request Status Selected Services Address Phone Number Fax Number    [facility] Pending - No Request Sent N/A [address] [place] [id number] [phone]    [facility] Pending - No Request Sent N/A [address] [place] [id number] [phone]      Durable Medical Equipment      Coordination has not been started for this encounter.      Dialysis/Infusion      Coordination has not been started for this encounter.      Home Medical Care      Coordination has not been started for this encounter.      Therapy      Coordination has not been started for this encounter.      Community Resources      Coordination has not been started for this encounter.        Expected Discharge Date and Time     Expected Discharge Date Expected Discharge Time    Feb 13, 2020         Demographic Summary    No documentation.       Functional Status    No documentation.       Psychosocial    No documentation.       Abuse/Neglect    No documentation.       Legal    No documentation.       Substance Abuse    No documentation.       Patient Forms    No documentation.           Tiffanie Contreras RN

## 2020-02-10 NOTE — PROGRESS NOTES
[facility] Medicine Services  PROGRESS NOTE    Patient Name: Varinder Delgado  : 1931  MRN: [id number]    Date of Admission: 2020  Primary Care Physician: Santiago Kuhn MD    Subjective   Subjective     CC:  Follow-up renal failure and DKA    HPI:  Awake this morning.  Denies confusion.  Denies pain.  Pleased that she is having increased urine.  Denies shortness of breath.  Notes some persistent leg swelling. She has family member at the bedside.    Review of Systems  No current fevers or chills  No current shortness of breath or cough  No current nausea, vomiting, or diarrhea  No current chest pain or palpitations    Objective   Objective     Vital Signs:   Temp:  [98.6 °F (37 °C)-99.6 °F (37.6 °C)] 99.6 °F (37.6 °C)  Heart Rate:  [68-77] 71  Resp:  [18] 18  BP: (169-230)/() 192/85        Physical Exam:  Constitutional:Awake, alert  HENT: NCAT, mucous membranes moist, neck supple  Respiratory: Clear to auscultation bilaterally, respiratory effort normal, nonlabored breathing   Cardiovascular: RRR, S1, S2, normal radial pulses  Gastrointestinal: Positive bowel sounds, soft, nontender, nondistended  Musculoskeletal: Normal musculature for age, + lower extremity edema, BMI 28  Psychiatric: Appropriate affect, cooperative, conversational  Neurologic: No slurred speech or facial droop, follows commands  Skin: No rashes or jaundice, warm      Results Reviewed:  Results from last 7 days   Lab Units 02/10/20  0347 02/09/20  0405 02/07/20  2120   WBC 10*3/mm3 14.06* 10.16 15.23*   HEMOGLOBIN g/dL 12.0 11.9* 15.9   HEMATOCRIT % 35.2 35.5 48.2*   PLATELETS 10*3/mm3 217 206 332     Results from last 7 days   Lab Units 02/10/20  03420  0405 20  0006  20  2300 200 20  1334   SODIUM mmol/L 135* 133* 136   < > 125* 126* 132*   POTASSIUM mmol/L 3.9 3.9 4.1   < > 5.8* 6.9* 5.5*   CHLORIDE mmol/L 101 100 102   < > 91* 91* 93*   CO2 mmol/L 19.0* 18.0*  19.0*   < > 13.0* 15.0* 21.9*   BUN mg/dL 83* 80* 81*   < > 83* 86* 33*   CREATININE mg/dL 4.71* 5.09* 5.17*   < > 6.25* 6.44* 1.66*   GLUCOSE mg/dL 103* 125* 123*   < > 522* 603* 548*   CALCIUM mg/dL 8.2* 7.4* 7.6*   < > 7.8* 8.4* 10.0   ALT (SGPT) U/L  --   --   --   --  9 14 14   AST (SGOT) U/L  --   --   --   --  16 40* 16   TROPONIN T ng/mL  --   --   --   --  0.029  --   --    PROBNP pg/mL  --   --   --   --   --   --  1,682.0    < > = values in this interval not displayed.     Estimated Creatinine Clearance: 7.6 mL/min (A) (by C-G formula based on SCr of 4.71 mg/dL (H)).    Microbiology Results Abnormal     Procedure Component Value - Date/Time    Urine Culture - Urine, Urine, Clean Catch [[id number]]  (Normal) Collected:  02/08/20 0556    Lab Status:  Final result Specimen:  Urine, Clean Catch Updated:  02/09/20 1039     Urine Culture No growth    Eosinophil Smear - Urine, Urine, Clean Catch [[id number]]  (Normal) Collected:  02/08/20 0556    Lab Status:  Final result Specimen:  Urine, Clean Catch Updated:  02/08/20 0919     Eosinophil Smear 0 % EOS/100 Cells     Narrative:       No eosinophil seen          Imaging Results (Last 24 Hours)     Procedure Component Value Units Date/Time    US Renal Limited [[id number]] Collected:  02/08/20 1458     Updated:  02/09/20 1342    Narrative:       EXAMINATION: US RENAL LIMITED - 02/08/2020     INDICATION: E13.10-Other specified diabetes mellitus with ketoacidosis  without coma; I16.0-Hypertensive urgency; N19-Unspecified kidney  failure. Renal failure. Renal insufficiency.     TECHNIQUE: Sonographic imaging is obtained of the kidneys in both the  sagittal and transverse planes.     COMPARISON: None.     FINDINGS: The right kidney measures in length from pole to pole 9.4 cm.  No solid cortical mass or renal cortical cyst seen within the right  kidney. No hydronephrosis or nephrolithiasis.     The left kidney is smaller in size measuring approximate 7.3 cm. There  is no  solid cortical mass or renal cortical cyst seen within the left  kidney. No hydronephrosis or nephrolithiasis. Bladder is incompletely  distended and grossly unremarkable in appearance.       Impression:       Asymmetry seen in the size of the kidneys with the right  kidney being larger than the left. No gross abnormality seen within  either kidney.     DICTATED:   02/08/2020  EDITED/ls :   02/09/2020                  Results for orders placed during the hospital encounter of 10/22/18   Adult Transthoracic Echo Limited W/ Cont if Necessary Per Protocol    Narrative · This was a limited echocardiogram to assess left ventricular ejection   fraction.  · Left ventricular systolic function is normal. Estimated EF = 60%.  · Left ventricular wall thickness is consistent with moderate concentric   hypertrophy.          I have reviewed the medications:  Scheduled Meds:    amLODIPine 10 mg Oral Daily   aspirin 81 mg Oral Daily   carvedilol 6.25 mg Oral BID With Meals   heparin (porcine) 5,000 Units Subcutaneous Q12H   insulin lispro 0-9 Units Subcutaneous 4x Daily With Meals & Nightly   insulin lispro 3 Units Subcutaneous TID With Meals   isosorbide mononitrate 60 mg Oral Q24H   latanoprost 1 drop Right Eye Nightly   nystatin  Topical Q12H   povidone-iodine  Topical Daily   timolol 1 drop Both Eyes Q12H     Continuous Infusions:     PRN Meds:.•  acetaminophen  •  dextrose  •  dextrose  •  glucagon (human recombinant)  •  ipratropium-albuterol  •  labetalol  •  ondansetron  •  sodium chloride    Assessment/Plan   Assessment & Plan     Active Hospital Problems    Diagnosis  POA   • **Diabetic acidosis without coma (CMS/HCC) [E11.10]  Yes   • Renal artery stenosis, right [I70.1]  Yes   • ATN (acute tubular necrosis) (CMS/HCC) [N17.0]  Yes   • Diarrhea of presumed infectious origin [R19.7]  Yes   • Essential hypertension [I10]  Yes   • Acute renal failure (ARF) (CMS/HCC) [N17.9]  Yes   • Type 2 diabetes mellitus with diabetic  nephropathy, with long-term current use of insulin (CMS/[facility]) [E11.21, Z79.4]  Not Applicable   • Hyponatremia [E87.1]  Yes   • Glaucoma [H40.9]  Yes   • Debility [R53.81]  Yes   • Metabolic acidosis, increased anion gap [E87.2]  Yes   • Hypertensive emergency [I16.1]  Yes   • Diabetes mellitus (CMS/[facility]) [E11.9]  Yes      Resolved Hospital Problems   No resolved problems to display.        Brief Hospital Course to date:  Varinder Delgado is a 88 y.o. female     Plan today:  Change glucose to 3 times a day with meals and bedtime.  Prandial insulin added.  Continue n.p.o. for now as nephrology needs to assess whether dialysis catheter placement is required today.  Urine output substantially improved with 1000 mL yesterday.  Creatinine improved but BUN approximately the same.  Likely to have slow recovery from ATN in the setting of advanced age.  Off IV fluids.  No further diarrhea.   Case discussed with pharmacist regarding insulin.  Continue as needed labetalol for high blood pressures.  Hold off Cardene drip.  Hyponatremia stable.  Leukocytosis noted but afebrile.  Continue PT OT.  Continue glaucoma drops.  Repeat laboratory studies tomorrow.  Renal ultrasound duplex reviewed with possible renal artery stenosis will discuss further with nephrology and avoid hypotension.  Complex case.    I have explained to patient at length that due to advanced age and severity of illness with comorbid conditions patient is at high risk for worsening morbidity or mortality.  She agrees with current treatment plan at this time.  Discussing code status but for now she wishes to stay full.        DVT Prophylaxis:   NALINI    Disposition: I expect the patient to be discharged location to be determined pending further improvement    CODE STATUS:   Code Status and Medical Interventions:   Ordered at: 02/08/20 0141     Code Status:    CPR     Medical Interventions (Level of Support Prior to Arrest):    Full         Electronically signed by  Rubin Babcock MD, 02/10/20, 8:34 AM.

## 2020-02-10 NOTE — THERAPY EVALUATION
Patient Name: Varinder Delgado  : 1931    MRN: [id number]                              Today's Date: 2/10/2020       Admit Date: 2020    Visit Dx:     ICD-10-CM ICD-9-CM   1. Diabetic ketoacidosis without coma associated with other specified diabetes mellitus (CMS/HCC) E13.10 250.12   2. Hypertensive urgency I16.0 401.9   3. Renal failure, unspecified chronicity N19 586   4. Impaired mobility and ADLs Z74.09 799.89     Patient Active Problem List   Diagnosis   • Essential hypertension   • Diabetes mellitus (CMS/[facility])   • Mixed hyperlipidemia   • Tobacco abuse   • Hypertensive emergency   • Coronary artery disease involving native coronary artery of native heart without angina pectoris   • Claudication (CMS/[facility])   • Lower extremity edema   • Diabetic acidosis without coma (CMS/[facility])   • Diarrhea of presumed infectious origin   • Essential hypertension   • Acute renal failure (ARF) (CMS/[facility])   • Type 2 diabetes mellitus with diabetic nephropathy, with long-term current use of insulin (CMS/[facility])   • Hyponatremia   • Glaucoma   • Debility   • Metabolic acidosis, increased anion gap   • ATN (acute tubular necrosis) (CMS/[facility])   • Renal artery stenosis, right     Past Medical History:   Diagnosis Date   • Diabetes mellitus (CMS/[facility])    • Hyperlipidemia    • Hypertension    • MI (myocardial infarction) (CMS/[facility])      Past Surgical History:   Procedure Laterality Date   • CARDIAC CATHETERIZATION N/A 2018    Procedure: Left Heart Cath;  Surgeon: Sreekanth De La Rosa MD;  Location: Replaced by [facility] CATH INVASIVE LOCATION;  Service: Cardiology   • MOUTH SURGERY       General Information     Row Name 02/10/20 0941          PT Evaluation Time/Intention    Document Type  evaluation  -AA     Mode of Treatment  physical therapy  -AA     Row Name 02/10/20 0941          General Information    Patient Profile Reviewed?  yes  -AA     Prior Level of Function  independent:;all household mobility;community mobility  -AA     Existing  Precautions/Restrictions  fall  -AA     Barriers to Rehab  cognitive status;medically complex  -AA     Row Name 02/10/20 0941          Relationship/Environment    Lives With  alone  -AA     Row Name 02/10/20 0941          Resource/Environmental Concerns    Current Living Arrangements  home/apartment/condo  -AA     Row Name 02/10/20 0941          Home Main Entrance    Number of Stairs, Main Entrance  none  -AA     Row Name 02/10/20 0941          Stairs Within Home, Primary    Number of Stairs, Within Home, Primary  none  -AA     Row Name 02/10/20 0941          Cognitive Assessment/Intervention- PT/OT    Orientation Status (Cognition)  oriented to;person;situation;verbal cues/prompts needed for orientation  -AA     Row Name 02/10/20 0941          Safety Issues, Functional Mobility    Safety Issues Affecting Function (Mobility)  awareness of need for assistance;insight into deficits/self awareness;judgment;positioning of assistive device;problem solving;safety precaution awareness;safety precautions follow-through/compliance;sequencing abilities  -AA     Impairments Affecting Function (Mobility)  balance;cognition;endurance/activity tolerance;shortness of breath;strength  -AA       User Key  (r) = Recorded By, (t) = Taken By, (c) = Cosigned By    Initials Name Provider Type    AA Carmenza Alaniz, PT Physical Therapist        Mobility     Row Name 02/10/20 0941          Bed Mobility Assessment/Treatment    Bed Mobility Assessment/Treatment  rolling right;scooting/bridging;supine-sit  -AA     Rolling Right [place] (Bed Mobility)  minimum assist (75% patient effort);verbal cues;nonverbal cues (demo/gesture)  -AA     Scooting/Bridging [place] (Bed Mobility)  maximum assist (25% patient effort);verbal cues;nonverbal cues (demo/gesture)  -AA     Supine-Sit [place] (Bed Mobility)  minimum assist (75% patient effort);verbal cues;nonverbal cues (demo/gesture);2 person assist  -AA     Assistive Device (Bed  Mobility)  bed rails;draw sheet;head of bed elevated  -AA     Comment (Bed Mobility)  difficulty following sequencing cues.  Pt require increased cues for hand placement and use of handrail.    -AA     Row Name 02/10/20 0941          Transfer Assessment/Treatment    Comment (Transfers)  VC for push off with BUE and transitioning to RW.  VC for RW placement.  STS from EOB CGA of 2  -AA     Row Name 02/10/20 0941          Sit-Stand Transfer    Sit-Stand [place] (Transfers)  contact guard;2 person assist;verbal cues  -AA     Assistive Device (Sit-Stand Transfers)  walker, front-wheeled  -AA     Row Name 02/10/20 0941          Gait/Stairs Assessment/Training    Gait/Stairs Assessment/Training  gait/ambulation independence;gait/ambulation assistive device;gait pattern;gait deviations  -AA     [place] Level (Gait)  minimum assist (75% patient effort);verbal cues  -AA     Assistive Device (Gait)  walker, front-wheeled  -AA     Distance in Feet (Gait)  10  -AA     Pattern (Gait)  step-through  -AA     Deviations/Abnormal Patterns (Gait)  bilateral deviations;[place] decreased;steppage;stride length decreased;base of support, wide  -AA     Bilateral Gait Deviations  forward flexed posture  -AA     Comment (Gait/Stairs)  Pt ambulated with RW and verbal/tactile cues for staying inside walker.  Difficulty following cues.  SOB noted with oxygen sat >92% on RA.    -AA       User Key  (r) = Recorded By, (t) = Taken By, (c) = Cosigned By    Initials Name Provider Type    AA Carmenza Alaniz, PT Physical Therapist        Obj/Interventions     Row Name 02/10/20 0941          General ROM    GENERAL ROM COMMENTS  BLE WNL  -AA     Row Name 02/10/20 0941          MMT (Manual Muscle Testing)    General MMT Comments  BLE 4-/5 grossly assessed  -AA     Row Name 02/10/20 0941          Static Sitting Balance    Level of [place] (Unsupported Sitting, Static Balance)  supervision  -AA     Sitting Position (Unsupported Sitting,  Static Balance)  sitting on edge of bed  -AA     Time Able to Maintain Position (Unsupported Sitting, Static Balance)  more than 5 minutes  -AA     Row Name 02/10/20 0941          Static Standing Balance    Level of [place] (Supported Standing, Static Balance)  contact guard assist  -AA     Time Able to Maintain Position (Supported Standing, Static Balance)  1 to 2 minutes  -AA     Assistive Device Utilized (Supported Standing, Static Balance)  walker, rolling  -AA     Row Name 02/10/20 0941          Dynamic Standing Balance    Level of [place], Reaches Outside Midline (Standing, Dynamic Balance)  contact guard assist;2 person assist  -AA     Time Able to Maintain Position, Reaches Outside Midline (Standing, Dynamic Balance)  45 to 60 seconds  -AA     Assistive Device Utilized (Supported Standing, Dynamic Balance)  walker, rolling  -AA     Row Name 02/10/20 0941          Sensory Assessment/Intervention    Sensory General Assessment  no sensation deficits identified  -       User Key  (r) = Recorded By, (t) = Taken By, (c) = Cosigned By    Initials Name Provider Type    AA Carmenza Alaniz L, PT Physical Therapist        Goals/Plan     Row Name 02/10/20 0941          Bed Mobility Goal 1 (PT)    Activity/Assistive Device (Bed Mobility Goal 1, PT)  bed mobility activities, all  -AA     [place] Level/Cues Needed (Bed Mobility Goal 1, PT)  conditional independence  -AA     Time Frame (Bed Mobility Goal 1, PT)  10 days  -     Row Name 02/10/20 0941          Transfer Goal 1 (PT)    Activity/Assistive Device (Transfer Goal 1, PT)  sit-to-stand/stand-to-sit;bed-to-chair/chair-to-bed  -AA     [place] Level/Cues Needed (Transfer Goal 1, PT)  supervision required  -AA     Time Frame (Transfer Goal 1, PT)  10 days  -     Row Name 02/10/20 0941          Gait Training Goal 1 (PT)    Activity/Assistive Device (Gait Training Goal 1, PT)  gait (walking locomotion);assistive device use  -AA     [place] Level  (Gait Training Goal 1, PT)  supervision required  -AA     Distance (Gait Goal 1, PT)  150 feet  -AA     Time Frame (Gait Training Goal 1, PT)  10 days  -AA       User Key  (r) = Recorded By, (t) = Taken By, (c) = Cosigned By    Initials Name Provider Type    Carmenza Lizarraga, PT Physical Therapist        Clinical Impression     Row Name 02/10/20 0941          Pain Assessment    Additional Documentation  Pain Scale: Numbers Pre/Post-Treatment (Group)  -AA     Row Name 02/10/20 0941          Pain Scale: Numbers Pre/Post-Treatment    Pain Scale: Numbers, Pretreatment  0/10 - no pain  -AA     Pain Scale: Numbers, Post-Treatment  0/10 - no pain  -AA     Row Name 02/10/20 0941          Plan of Care Review    Plan of Care Reviewed With  patient  -AA     Progress  improving  -AA     Row Name 02/10/20 0941          Physical Therapy Clinical Impression    Criteria for Skilled Interventions Met (PT Clinical Impression)  yes;treatment indicated  -AA     Rehab Potential (PT Clinical Summary)  good, to achieve stated therapy goals  -AA     Predicted Duration of Therapy (PT)  10 days  -AA     Row Name 02/10/20 0941          Vital Signs    Pre Systolic BP Rehab  190  -AA     Pre Treatment Diastolic BP  106  -AA     Intra Systolic BP Rehab  174  -AA     Intra Treatment Diastolic BP  80  -AA     Post Systolic BP Rehab  172  -AA     Post Treatment Diastolic BP  75  -AA     Pre SpO2 (%)  94  -AA     O2 Delivery Pre Treatment  room air  -AA     Intra SpO2 (%)  92  -AA     O2 Delivery Intra Treatment  room air  -AA     Post SpO2 (%)  94  -AA     O2 Delivery Post Treatment  room air  -AA     Pre Patient Position  Supine  -AA     Intra Patient Position  Standing  -AA     Post Patient Position  Sitting  -AA     Row Name 02/10/20 0941          Positioning and Restraints    Pre-Treatment Position  in bed  -AA     Post Treatment Position  chair  -AA     In Chair  notified nsg;exit alarm on;waffle cushion;reclined;with family/caregiver;call  light within reach;encouraged to call for assist  -AA       User Key  (r) = Recorded By, (t) = Taken By, (c) = Cosigned By    Initials Name Provider Type    Carmenza Lizarraga PT Physical Therapist        Outcome Measures     Row Name 02/10/20 0941          How much help from another person do you currently need...    Turning from your back to your side while in flat bed without using bedrails?  3  -AA     Moving from lying on back to sitting on the side of a flat bed without bedrails?  3  -AA     Moving to and from a bed to a chair (including a wheelchair)?  3  -AA     Standing up from a chair using your arms (e.g., wheelchair, bedside chair)?  3  -AA     Climbing 3-5 steps with a railing?  2  -AA     To walk in hospital room?  3  -AA     AM-PAC 6 Clicks Score (PT)  17  -AA     Row Name 02/10/20 0941          Functional Assessment    Outcome Measure Options  AM-PAC 6 Clicks Basic Mobility (PT)  -AA       User Key  (r) = Recorded By, (t) = Taken By, (c) = Cosigned By    Initials Name Provider Type    Carmenza Lizarrgaa, PT Physical Therapist          PT Recommendation and Plan  Planned Therapy Interventions (PT Eval): balance training, gait training, bed mobility training, home exercise program, patient/family education, strengthening, transfer training  Outcome Summary/Treatment Plan (PT)  Anticipated Discharge Disposition (PT): inpatient rehabilitation facility  Plan of Care Reviewed With: patient  Progress: improving  Outcome Summary: PT eval complete.  Pt presents with weakness and functional mobility decline warranting skilled IPPT services.  Pt require min A for supine to sit.  MAx A for scooting in sitting.  CGA with RW for transfers.  Pt ambulated 10' with RW and min A of 2.  Recommend DC to IRF when appropriate     Time Calculation:   PT Charges     Row Name 02/10/20 0941             Time Calculation    Start Time  0941  -AA      PT Received On  02/10/20  -      PT Goal Re-Cert Due Date  02/20/20  -AA         User Key  (r) = Recorded By, (t) = Taken By, (c) = Cosigned By    Initials Name Provider Type    AA Carmenza Alaniz, PT Physical Therapist        Therapy Charges for Today     Code Description Service Date Service Provider Modifiers Qty    06110270392 HC PT EVAL MOD COMPLEXITY 4 2/10/2020 Carmenza Alaniz, PT GP 1          PT G-Codes  Outcome Measure Options: AM-PAC 6 Clicks Daily Activity (OT)  AM-PAC 6 Clicks Score (PT): 17  AM-PAC 6 Clicks Score (OT): 16    April NAOMI Alaniz PT  2/10/2020

## 2020-02-10 NOTE — PLAN OF CARE
BP elevated labetalol given with little change in BP, NSR on telemetry, room air, f/c in place, npo since MN for possible ashcath placement will monitor

## 2020-02-10 NOTE — PLAN OF CARE
Problem: Patient Care Overview  Goal: Plan of Care Review  Flowsheets (Taken 2/10/2020 4881)  Plan of Care Reviewed With: patient; son  Outcome Summary: OT eval completed Pt presents with defictis in strength, activity tolerance, balance, and sequencing for ADL completion compared to baseline, recom IPOT d/c IRF; CGA STS, min A functional mob with FWW, max A donning socks

## 2020-02-10 NOTE — PROGRESS NOTES
"   LOS: 2 days    Patient Care Team:  Santiago Kuhn MD as PCP - General (Internal Medicine)        Subjective     Interval History:     Feeling better, No acute events overnight. UOP is improving.     Review of Systems:   No CP or SOA. No edema    Objective     Vital Sign Min/Max for last 24 hours  Temp  Min: 98.6 °F (37 °C)  Max: 99.6 °F (37.6 °C)   BP  Min: 169/91  Max: 230/94   Pulse  Min: 68  Max: 77   Resp  Min: 18  Max: 18   SpO2  Min: 93 %  Max: 99 %   No data recorded   No data recorded     Flowsheet Rows      First Filed Value   Admission Height  157.5 cm (62\") Documented at 02/07/2020 2053   Admission Weight  63.5 kg (140 lb) Documented at 02/07/2020 2053          No intake/output data recorded.  I/O last 3 completed shifts:  In: 1380 [P.O.:380; I.V.:1000]  Out: 1100 [Urine:1100]    Physical Exam:     General Appearance:    Alert, cooperative, in no acute distress   Head:    Normocephalic, without obvious abnormality, atraumatic               Neck:   No adenopathy, supple, trachea midline, no thyromegaly, no     carotid bruit, no JVD       Lungs:     Clear to auscultation,respirations regular, even and                   unlabored    Heart:    Regular rhythm and normal rate, normal S1 and S2, no            murmur, no gallop, no rub, no click       Abdomen:     Normal bowel sounds, no masses, no organomegaly, soft        non-tender, non-distended, no guarding, no rebound                 tenderness       Extremities:   Moves all extremities well, no edema, no cyanosis, no              redness               Neurologic:   Cranial nerves 2 - 12 grossly intact,no focal deficit noted       WBC WBC   Date Value Ref Range Status   02/10/2020 14.06 (H) 3.40 - 10.80 10*3/mm3 Final   02/09/2020 10.16 3.40 - 10.80 10*3/mm3 Final   02/07/2020 15.23 (H) 3.40 - 10.80 10*3/mm3 Final      HGB Hemoglobin   Date Value Ref Range Status   02/10/2020 12.0 12.0 - 15.9 g/dL Final   02/09/2020 11.9 (L) 12.0 - 15.9 g/dL Final "   02/07/2020 15.9 12.0 - 15.9 g/dL Final      HCT Hematocrit   Date Value Ref Range Status   02/10/2020 35.2 34.0 - 46.6 % Final   02/09/2020 35.5 34.0 - 46.6 % Final   02/07/2020 48.2 (H) 34.0 - 46.6 % Final      Platlets No results found for: LABPLAT   MCV MCV   Date Value Ref Range Status   02/10/2020 83.0 79.0 - 97.0 fL Final   02/09/2020 83.7 79.0 - 97.0 fL Final   02/07/2020 83.8 79.0 - 97.0 fL Final          Sodium Sodium   Date Value Ref Range Status   02/10/2020 135 (L) 136 - 145 mmol/L Final   02/09/2020 133 (L) 136 - 145 mmol/L Final   02/09/2020 136 136 - 145 mmol/L Final   02/08/2020 131 (L) 136 - 145 mmol/L Final   02/08/2020 132 (L) 136 - 145 mmol/L Final   02/08/2020 132 (L) 136 - 145 mmol/L Final   02/08/2020 128 (L) 136 - 145 mmol/L Final   02/07/2020 125 (L) 136 - 145 mmol/L Final   02/07/2020 126 (L) 136 - 145 mmol/L Final      Potassium Potassium   Date Value Ref Range Status   02/10/2020 3.9 3.5 - 5.2 mmol/L Final   02/09/2020 3.9 3.5 - 5.2 mmol/L Final   02/09/2020 4.1 3.5 - 5.2 mmol/L Final   02/08/2020 4.7 3.5 - 5.2 mmol/L Final   02/08/2020 4.8 3.5 - 5.2 mmol/L Final   02/08/2020 4.8 3.5 - 5.2 mmol/L Final   02/08/2020 5.2 3.5 - 5.2 mmol/L Final   02/07/2020 5.8 (H) 3.5 - 5.2 mmol/L Final   02/07/2020 6.9 (C) 3.5 - 5.2 mmol/L Final     Comment:     Specimen hemolyzed.  Results may be affected.      Chloride Chloride   Date Value Ref Range Status   02/10/2020 101 98 - 107 mmol/L Final   02/09/2020 100 98 - 107 mmol/L Final   02/09/2020 102 98 - 107 mmol/L Final   02/08/2020 99 98 - 107 mmol/L Final   02/08/2020 104 98 - 107 mmol/L Final   02/08/2020 99 98 - 107 mmol/L Final   02/08/2020 97 (L) 98 - 107 mmol/L Final   02/07/2020 91 (L) 98 - 107 mmol/L Final   02/07/2020 91 (L) 98 - 107 mmol/L Final      CO2 CO2   Date Value Ref Range Status   02/10/2020 19.0 (L) 22.0 - 29.0 mmol/L Final   02/09/2020 18.0 (L) 22.0 - 29.0 mmol/L Final   02/09/2020 19.0 (L) 22.0 - 29.0 mmol/L Final   02/08/2020  14.0 (L) 22.0 - 29.0 mmol/L Final   02/08/2020 14.0 (L) 22.0 - 29.0 mmol/L Final   02/08/2020 14.0 (L) 22.0 - 29.0 mmol/L Final   02/08/2020 12.0 (L) 22.0 - 29.0 mmol/L Final   02/07/2020 13.0 (L) 22.0 - 29.0 mmol/L Final   02/07/2020 15.0 (L) 22.0 - 29.0 mmol/L Final      BUN BUN   Date Value Ref Range Status   02/10/2020 83 (H) 8 - 23 mg/dL Final   02/09/2020 80 (H) 8 - 23 mg/dL Final   02/09/2020 81 (H) 8 - 23 mg/dL Final   02/08/2020 78 (H) 8 - 23 mg/dL Final   02/08/2020 80 (H) 8 - 23 mg/dL Final   02/08/2020 78 (H) 8 - 23 mg/dL Final   02/08/2020 78 (H) 8 - 23 mg/dL Final   02/07/2020 83 (H) 8 - 23 mg/dL Final   02/07/2020 86 (H) 8 - 23 mg/dL Final      Creatinine Creatinine   Date Value Ref Range Status   02/10/2020 4.71 (H) 0.57 - 1.00 mg/dL Final   02/09/2020 5.09 (H) 0.57 - 1.00 mg/dL Final   02/09/2020 5.17 (H) 0.57 - 1.00 mg/dL Final   02/08/2020 5.35 (H) 0.57 - 1.00 mg/dL Final   02/08/2020 5.93 (H) 0.57 - 1.00 mg/dL Final   02/08/2020 5.77 (H) 0.57 - 1.00 mg/dL Final   02/08/2020 5.61 (H) 0.57 - 1.00 mg/dL Final   02/07/2020 6.25 (H) 0.57 - 1.00 mg/dL Final   02/07/2020 6.44 (H) 0.57 - 1.00 mg/dL Final      Calcium Calcium   Date Value Ref Range Status   02/10/2020 8.2 (L) 8.6 - 10.5 mg/dL Final   02/09/2020 7.4 (L) 8.6 - 10.5 mg/dL Final   02/09/2020 7.6 (L) 8.6 - 10.5 mg/dL Final   02/08/2020 7.4 (L) 8.6 - 10.5 mg/dL Final   02/08/2020 7.4 (L) 8.6 - 10.5 mg/dL Final   02/08/2020 7.6 (L) 8.6 - 10.5 mg/dL Final   02/08/2020 7.2 (L) 8.6 - 10.5 mg/dL Final   02/07/2020 7.8 (L) 8.6 - 10.5 mg/dL Final   02/07/2020 8.4 (L) 8.6 - 10.5 mg/dL Final      PO4 No results found for: CAPO4   Albumin Albumin   Date Value Ref Range Status   02/07/2020 3.30 (L) 3.50 - 5.20 g/dL Final   02/07/2020 3.80 3.50 - 5.20 g/dL Final      Magnesium Magnesium   Date Value Ref Range Status   02/09/2020 2.0 1.6 - 2.4 mg/dL Final   02/07/2020 2.4 1.6 - 2.4 mg/dL Final      Uric Acid No results found for: URICACID        Results  Review:     I reviewed the patient's new clinical results.      amLODIPine 10 mg Oral Daily   aspirin 81 mg Oral Daily   carvedilol 6.25 mg Oral BID With Meals   heparin (porcine) 5,000 Units Subcutaneous Q12H   insulin lispro 0-9 Units Subcutaneous 4x Daily With Meals & Nightly   insulin lispro 3 Units Subcutaneous TID With Meals   isosorbide mononitrate 60 mg Oral Q24H   latanoprost 1 drop Right Eye Nightly   nystatin  Topical Q12H   povidone-iodine  Topical Daily   timolol 1 drop Both Eyes Q12H        RENAL ULTRASOUND:    FINDINGS: The right kidney measures in length from pole to pole 9.4 cm.  No solid cortical mass or renal cortical cyst seen within the right  kidney. No hydronephrosis or nephrolithiasis.     The left kidney is smaller in size measuring approximate 7.3 cm. There  is no solid cortical mass or renal cortical cyst seen within the left  kidney. No hydronephrosis or nephrolithiasis. Bladder is incompletely  distended and grossly unremarkable in appearance.     IMPRESSION:  Asymmetry seen in the size of the kidneys with the right  kidney being larger than the left. No gross abnormality seen within  either kidney.    Renal duplex:    FINDINGS: Right kidney measures 10.2 cm in length without gross  hydronephrosis. Peak systolic velocity at the level of the abdominal  aorta measures 61.6 cm/s. Limited visualization, however, parenchymal  resistive indices measure up to 0.75 which are elevated.     Left kidney not clearly visualized and suboptimal due to bowel gas and  overall body habitus.     IMPRESSION:  Limited evaluation due to several factors as detailed above  with nonvisualization of the left kidney. However within limits of the  study, there is noted increased resistive indices within the right  kidney from expected measuring up to 0.75 concern for renal artery  stenosis at least on the right.    Medication Review:     Assessment/Plan       Diabetic acidosis without coma (CMS/HCC)    Diabetes  mellitus (CMS/[facility])    Hypertensive emergency    Diarrhea of presumed infectious origin    Essential hypertension    Acute renal failure (ARF) (CMS/[facility])    Type 2 diabetes mellitus with diabetic nephropathy, with long-term current use of insulin (CMS/[facility])    Hyponatremia    Glaucoma    Debility    Metabolic acidosis, increased anion gap    ATN (acute tubular necrosis) (CMS/[facility])    Renal artery stenosis, right    1- SALOMÓN - non oliguric - Scr 4.71 - improved from 5.0. UOP 1 lit/24 hrs. Baseline Scr 1.2. Likely Hypertensive crisis induced vs GN. (serologic workup pending)   2- Metabolic acidosis -DKA  3- Hypertension crisis   4- Hyperkalemia -resolved.   5- Mild hyponatremia  6- Hyperglycemia - improved  7- Proteinuria - P/C ratio 0.4    Plan:  - Renal function and UOP improved. No emergent need of dialysis.   - Monitor I/O strictly  - Renal diet.   - Renal duplex - left side not visualized and right side with elevated RI - 0.75 and PSV is less than 180..I will avoid contrast study for further workup with SALOMÓN. CO 2 angio not available. Will monitor   - Continue with current.       Alcon Kellogg MD  02/10/20  8:46 AM

## 2020-02-11 LAB
ANION GAP SERPL CALCULATED.3IONS-SCNC: 15 MMOL/L (ref 5–15)
BUN BLD-MCNC: 78 MG/DL (ref 8–23)
BUN/CREAT SERPL: 21.7 (ref 7–25)
C-ANCA TITR SER IF: ABNORMAL TITER
CALCIUM SPEC-SCNC: 8.3 MG/DL (ref 8.6–10.5)
CHLORIDE SERPL-SCNC: 101 MMOL/L (ref 98–107)
CO2 SERPL-SCNC: 23 MMOL/L (ref 22–29)
CREAT BLD-MCNC: 3.6 MG/DL (ref 0.57–1)
DEPRECATED RDW RBC AUTO: 42.9 FL (ref 37–54)
ERYTHROCYTE [DISTWIDTH] IN BLOOD BY AUTOMATED COUNT: 14.1 % (ref 12.3–15.4)
GFR SERPL CREATININE-BSD FRML MDRD: 14 ML/MIN/1.73
GFR SERPL CREATININE-BSD FRML MDRD: ABNORMAL ML/MIN/{1.73_M2}
GLUCOSE BLD-MCNC: 65 MG/DL (ref 65–99)
GLUCOSE BLDC GLUCOMTR-MCNC: 252 MG/DL (ref 70–130)
GLUCOSE BLDC GLUCOMTR-MCNC: 254 MG/DL (ref 70–130)
GLUCOSE BLDC GLUCOMTR-MCNC: 322 MG/DL (ref 70–130)
GLUCOSE BLDC GLUCOMTR-MCNC: 74 MG/DL (ref 70–130)
HCT VFR BLD AUTO: 32.1 % (ref 34–46.6)
HGB BLD-MCNC: 11 G/DL (ref 12–15.9)
MCH RBC QN AUTO: 28.7 PG (ref 26.6–33)
MCHC RBC AUTO-ENTMCNC: 34.3 G/DL (ref 31.5–35.7)
MCV RBC AUTO: 83.8 FL (ref 79–97)
MYELOPEROXIDASE AB SER-ACNC: <9 U/ML (ref 0–9)
P-ANCA ATYPICAL TITR SER IF: ABNORMAL TITER
P-ANCA TITR SER IF: ABNORMAL TITER
PLATELET # BLD AUTO: 207 10*3/MM3 (ref 140–450)
PMV BLD AUTO: 11.2 FL (ref 6–12)
POTASSIUM BLD-SCNC: 3.1 MMOL/L (ref 3.5–5.2)
PROTEINASE3 AB SER IA-ACNC: <3.5 U/ML (ref 0–3.5)
RBC # BLD AUTO: 3.83 10*6/MM3 (ref 3.77–5.28)
SODIUM BLD-SCNC: 139 MMOL/L (ref 136–145)
WBC NRBC COR # BLD: 13.02 10*3/MM3 (ref 3.4–10.8)

## 2020-02-11 PROCEDURE — 99233 SBSQ HOSP IP/OBS HIGH 50: CPT | Performed by: INTERNAL MEDICINE

## 2020-02-11 PROCEDURE — 84165 PROTEIN E-PHORESIS SERUM: CPT | Performed by: INTERNAL MEDICINE

## 2020-02-11 PROCEDURE — 97116 GAIT TRAINING THERAPY: CPT

## 2020-02-11 PROCEDURE — 94799 UNLISTED PULMONARY SVC/PX: CPT

## 2020-02-11 PROCEDURE — 97530 THERAPEUTIC ACTIVITIES: CPT

## 2020-02-11 PROCEDURE — 80048 BASIC METABOLIC PNL TOTAL CA: CPT | Performed by: INTERNAL MEDICINE

## 2020-02-11 PROCEDURE — 63710000001 INSULIN LISPRO (HUMAN) PER 5 UNITS: Performed by: INTERNAL MEDICINE

## 2020-02-11 PROCEDURE — 97110 THERAPEUTIC EXERCISES: CPT

## 2020-02-11 PROCEDURE — 82784 ASSAY IGA/IGD/IGG/IGM EACH: CPT | Performed by: INTERNAL MEDICINE

## 2020-02-11 PROCEDURE — 84156 ASSAY OF PROTEIN URINE: CPT | Performed by: INTERNAL MEDICINE

## 2020-02-11 PROCEDURE — 82962 GLUCOSE BLOOD TEST: CPT

## 2020-02-11 PROCEDURE — 84166 PROTEIN E-PHORESIS/URINE/CSF: CPT | Performed by: INTERNAL MEDICINE

## 2020-02-11 PROCEDURE — 85027 COMPLETE CBC AUTOMATED: CPT | Performed by: INTERNAL MEDICINE

## 2020-02-11 PROCEDURE — 84155 ASSAY OF PROTEIN SERUM: CPT | Performed by: INTERNAL MEDICINE

## 2020-02-11 PROCEDURE — 25010000002 HEPARIN (PORCINE) PER 1000 UNITS: Performed by: INTERNAL MEDICINE

## 2020-02-11 PROCEDURE — 86334 IMMUNOFIX E-PHORESIS SERUM: CPT | Performed by: INTERNAL MEDICINE

## 2020-02-11 RX ORDER — POTASSIUM CHLORIDE 750 MG/1
40 CAPSULE, EXTENDED RELEASE ORAL ONCE
Status: COMPLETED | OUTPATIENT
Start: 2020-02-11 | End: 2020-02-11

## 2020-02-11 RX ORDER — HYDRALAZINE HYDROCHLORIDE 10 MG/1
10 TABLET, FILM COATED ORAL EVERY 8 HOURS SCHEDULED
Status: DISCONTINUED | OUTPATIENT
Start: 2020-02-11 | End: 2020-02-12

## 2020-02-11 RX ADMIN — HYDRALAZINE HYDROCHLORIDE 10 MG: 10 TABLET ORAL at 20:40

## 2020-02-11 RX ADMIN — MELATONIN TAB 5 MG 5 MG: 5 TAB at 20:40

## 2020-02-11 RX ADMIN — ASPIRIN 81 MG CHEWABLE TABLET 81 MG: 81 TABLET CHEWABLE at 08:55

## 2020-02-11 RX ADMIN — POTASSIUM CHLORIDE 40 MEQ: 750 CAPSULE, EXTENDED RELEASE ORAL at 08:54

## 2020-02-11 RX ADMIN — IPRATROPIUM BROMIDE AND ALBUTEROL SULFATE 3 ML: 2.5; .5 SOLUTION RESPIRATORY (INHALATION) at 12:09

## 2020-02-11 RX ADMIN — INSULIN LISPRO 4 UNITS: 100 INJECTION, SOLUTION INTRAVENOUS; SUBCUTANEOUS at 12:42

## 2020-02-11 RX ADMIN — INSULIN LISPRO 8 UNITS: 100 INJECTION, SOLUTION INTRAVENOUS; SUBCUTANEOUS at 12:42

## 2020-02-11 RX ADMIN — LATANOPROST 1 DROP: 50 SOLUTION OPHTHALMIC at 20:45

## 2020-02-11 RX ADMIN — IPRATROPIUM BROMIDE AND ALBUTEROL SULFATE 3 ML: 2.5; .5 SOLUTION RESPIRATORY (INHALATION) at 07:00

## 2020-02-11 RX ADMIN — TIMOLOL MALEATE 1 DROP: 5 SOLUTION/ DROPS OPHTHALMIC at 20:39

## 2020-02-11 RX ADMIN — TIMOLOL MALEATE 1 DROP: 5 SOLUTION/ DROPS OPHTHALMIC at 08:58

## 2020-02-11 RX ADMIN — INSULIN LISPRO 8 UNITS: 100 INJECTION, SOLUTION INTRAVENOUS; SUBCUTANEOUS at 18:06

## 2020-02-11 RX ADMIN — INSULIN LISPRO 10 UNITS: 100 INJECTION, SOLUTION INTRAVENOUS; SUBCUTANEOUS at 20:44

## 2020-02-11 RX ADMIN — AMLODIPINE BESYLATE 10 MG: 10 TABLET ORAL at 08:56

## 2020-02-11 RX ADMIN — CARVEDILOL 12.5 MG: 12.5 TABLET, FILM COATED ORAL at 08:56

## 2020-02-11 RX ADMIN — HEPARIN SODIUM 5000 UNITS: 5000 INJECTION, SOLUTION INTRAVENOUS; SUBCUTANEOUS at 08:55

## 2020-02-11 RX ADMIN — INSULIN LISPRO 4 UNITS: 100 INJECTION, SOLUTION INTRAVENOUS; SUBCUTANEOUS at 18:04

## 2020-02-11 RX ADMIN — ISOSORBIDE MONONITRATE 60 MG: 60 TABLET, EXTENDED RELEASE ORAL at 08:56

## 2020-02-11 RX ADMIN — NYSTATIN: 100000 POWDER TOPICAL at 12:17

## 2020-02-11 RX ADMIN — HYDRALAZINE HYDROCHLORIDE 10 MG: 10 TABLET ORAL at 14:42

## 2020-02-11 RX ADMIN — IPRATROPIUM BROMIDE AND ALBUTEROL SULFATE 3 ML: 2.5; .5 SOLUTION RESPIRATORY (INHALATION) at 23:56

## 2020-02-11 RX ADMIN — HYDRALAZINE HYDROCHLORIDE 10 MG: 10 TABLET ORAL at 08:55

## 2020-02-11 RX ADMIN — CARVEDILOL 12.5 MG: 12.5 TABLET, FILM COATED ORAL at 18:04

## 2020-02-11 RX ADMIN — HEPARIN SODIUM 5000 UNITS: 5000 INJECTION, SOLUTION INTRAVENOUS; SUBCUTANEOUS at 20:40

## 2020-02-11 RX ADMIN — POVIDONE-IODINE: 10 SOLUTION TOPICAL at 08:57

## 2020-02-11 RX ADMIN — IPRATROPIUM BROMIDE AND ALBUTEROL SULFATE 3 ML: 2.5; .5 SOLUTION RESPIRATORY (INHALATION) at 16:50

## 2020-02-11 RX ADMIN — IPRATROPIUM BROMIDE AND ALBUTEROL SULFATE 3 ML: 2.5; .5 SOLUTION RESPIRATORY (INHALATION) at 19:15

## 2020-02-11 NOTE — PLAN OF CARE
Problem: Patient Care Overview  Goal: Plan of Care Review  Outcome: Ongoing (interventions implemented as appropriate)  Flowsheets (Taken 2/11/2020 1603)  Plan of Care Reviewed With: patient  Outcome Summary: VSS, pt placed back on 2L due to O2 sat 88% resting on RA. Kohli catheter removed, pure wick in place for strict I&O's. Pt. denies pain or discomfort. Will continue to monitor.

## 2020-02-11 NOTE — THERAPY TREATMENT NOTE
Patient Name: Varinder Delgado  : 1931    MRN: [id number]                              Today's Date: 2020       Admit Date: 2020    Visit Dx:     ICD-10-CM ICD-9-CM   1. Diabetic ketoacidosis without coma associated with other specified diabetes mellitus (CMS/HCC) E13.10 250.12   2. Hypertensive urgency I16.0 401.9   3. Renal failure, unspecified chronicity N19 586   4. Impaired mobility and ADLs Z74.09 799.89     Patient Active Problem List   Diagnosis   • Essential hypertension   • Diabetes mellitus (CMS/[facility])   • Mixed hyperlipidemia   • Tobacco abuse   • Hypertensive emergency   • Coronary artery disease involving native coronary artery of native heart without angina pectoris   • Claudication (CMS/[facility])   • Lower extremity edema   • Diabetic acidosis without coma (CMS/[facility])   • Diarrhea of presumed infectious origin   • Essential hypertension   • Acute renal failure (ARF) (CMS/[facility])   • Type 2 diabetes mellitus with diabetic nephropathy, with long-term current use of insulin (CMS/[facility])   • Hyponatremia   • Glaucoma   • Debility   • Metabolic acidosis, increased anion gap   • ATN (acute tubular necrosis) (CMS/[facility])   • Renal artery stenosis, right     Past Medical History:   Diagnosis Date   • Diabetes mellitus (CMS/[facility])    • Hyperlipidemia    • Hypertension    • MI (myocardial infarction) (CMS/[facility])      Past Surgical History:   Procedure Laterality Date   • CARDIAC CATHETERIZATION N/A 2018    Procedure: Left Heart Cath;  Surgeon: Sreekanth De La Rosa MD;  Location: [facility] CATH INVASIVE LOCATION;  Service: Cardiology   • MOUTH SURGERY       General Information     Row Name 20 0834          PT Evaluation Time/Intention    Document Type  therapy note (daily note)  -DM     Mode of Treatment  physical therapy  -DM     Row Name 20 0834          General Information    Existing Precautions/Restrictions  fall ARF (MD keeping SBP high to perfuse kidneys)  -DM     Barriers to Rehab  visual deficit  "GLAUCOMA  -DM       User Key  (r) = Recorded By, (t) = Taken By, (c) = Cosigned By    Initials Name Provider Type    DM Lakisha Leon, PT Physical Therapist        Mobility     Row Name 02/11/20 0834          Bed Mobility Assessment/Treatment    Bed Mobility Assessment/Treatment  rolling right;scooting/bridging;supine-sit nsg in to assess & d/c glover cath; stating SBP high per MD to perfuse kidneys  -DM     Rolling Right [place] (Bed Mobility)  verbal cues;contact guard  -DM     Scooting/Bridging [place] (Bed Mobility)  minimum assist (75% patient effort)  -DM     Supine-Sit [place] (Bed Mobility)  moderate assist (50% patient effort);verbal cues had LOB post.& to R once partially upright; mod A to scoot fully to EOB w/draw sheet  -DM     Assistive Device (Bed Mobility)  draw sheet;head of bed elevated  -DM     Comment (Bed Mobility)  cued to use R bedrail but unable; c/o \"so stiff today\"  -DM     Row Name 02/11/20 0834          Transfer Assessment/Treatment    Comment (Transfers)  cues for seq.  -DM     Row Name 02/11/20 0834          Sit-Stand Transfer    Sit-Stand [place] (Transfers)  verbal cues;minimum assist (75% patient effort) 2 trials (EOB, then chair);diffic shifting hips forward;cued to push from surface (EOB,then armrests) but impulsively grabbing AD to pull up  -DM     Assistive Device (Sit-Stand Transfers)  walker, front-wheeled  -DM     Row Name 02/11/20 0834          Gait/Stairs Assessment/Training    Gait/Stairs Assessment/Training  gait/ambulation independence  -DM     [place] Level (Gait)  verbal cues;minimum assist (75% patient effort) min A to guide R wx,luiz. on turns (drifts R);sat. decr. to 86% 1st trial (6 ft to chair);4 min.sit rest;sat.recovered to 89% w/ PLB;20 ft 2nd trial,w/ 2 stand.rests d/t mod SOB & incr. flexed posture; sat. 87%; 4 min. rest UIC p/t cooldown exer; PLB  -DM     Assistive Device (Gait)  walker, front-wheeled  -DM     Distance in Feet (Gait)  " "26 (6 + 20, w/ 4 min.sit +2 stand. rests)  -DM     Pattern (Gait)  step-to  -DM     Deviations/Abnormal Patterns (Gait)  base of support, narrow;neil decreased;stride length decreased decr. step length; \"tentative\" steps (init. req.cues to init. swing through)  -DM     Bilateral Gait Deviations  forward flexed posture;heel strike decreased;weight shift ability decreased \"legs stiff\"  -DM     Comment (Gait/Stairs)  cues to incr step length up into AD, ext trunk/focus ahead, maintain midline  -DM       User Key  (r) = Recorded By, (t) = Taken By, (c) = Cosigned By    Initials Name Provider Type    DM Lakisha Leon, PT Physical Therapist        Obj/Interventions     Row Name 02/11/20 0834          Therapeutic Exercise    Lower Extremity (Therapeutic Exercise)  gluteal sets;heel slides, bilateral;LAQ (long arc quad), bilateral;marching while seated;quad sets, bilateral;SAQ (short arc quad), bilateral only shasta 5 reps shahab (unable to comprehend HS sets)  -DM     Lower Extremity Range of Motion (Therapeutic Exercise)  hip abduction/adduction, bilateral;hip internal/external rotation, bilateral;ankle dorsiflexion/plantar flexion, bilateral abdom sets  -DM     Exercise Type (Therapeutic Exercise)  AROM (active range of motion);AAROM (active assistive range of motion);isometric contraction, static  -DM     Position (Therapeutic Exercise)  seated;supine  -DM     Sets/Reps (Therapeutic Exercise)  1/10  -DM     Expected Outcome (Therapeutic Exercise)  improve functional stability;improve functional tolerance, single extremity activity  -DM     Row Name 02/11/20 0834          Static Sitting Balance    Level of [place] (Unsupported Sitting, Static Balance)  supervision  -DM     Sitting Position (Unsupported Sitting, Static Balance)  sitting on edge of bed;sitting in chair  -DM     Time Able to Maintain Position (Unsupported Sitting, Static Balance)  3 to 4 minutes  -DM     Comment (Unsupported Sitting, Static Balance)  " trunk ext, PLB, LE exer  -DM     Row Name 02/11/20 0834          Dynamic Sitting Balance    Level of [place], Reaches Outside Midline (Sitting, Dynamic Balance)  standby assist  -DM     Sitting Position, Reaches Outside Midline (Sitting, Dynamic Balance)  sitting in chair;sitting on edge of bed  -DM     Comment, Reaches Outside Midline (Sitting, Dynamic Balance)  recip scooting (slid down in chair x 2)  -DM     Row Name 02/11/20 0834          Static Standing Balance    Level of [place] (Supported Standing, Static Balance)  contact guard assist  -DM     Time Able to Maintain Position (Supported Standing, Static Balance)  45 to 60 seconds  -DM     Assistive Device Utilized (Supported Standing, Static Balance)  walker, rolling  -DM     Comment (Supported Standing, Static Balance)  incr WB through UE's, focusing ahead  -DM     Row Name 02/11/20 0834          Dynamic Standing Balance    Level of [place], Reaches Outside Midline (Standing, Dynamic Balance)  minimal assist, 75% patient effort  -DM     Time Able to Maintain Position, Reaches Outside Midline (Standing, Dynamic Balance)  45 to 60 seconds  -DM     Assistive Device Utilized (Supported Standing, Dynamic Balance)  walker, rolling  -DM     Comment, Reaches Outside Midline (Standing, Dynamic Balance)  WS to init sidesteps/backing to chair  -DM       User Key  (r) = Recorded By, (t) = Taken By, (c) = Cosigned By    Initials Name Provider Type    Lakisha Maher, PT Physical Therapist        Goals/Plan    No documentation.       Clinical Impression     Row Name 02/11/20 0834          Pain Assessment    Additional Documentation  Pain Scale: Numbers Pre/Post-Treatment (Group)  -DM     Row Name 02/11/20 0834          Pain Scale: Numbers Pre/Post-Treatment    Pain Scale: Numbers, Pretreatment  0/10 - no pain  -DM     Pain Scale: Numbers, Post-Treatment  0/10 - no pain  -DM     Row Name 02/11/20 0834          Vital Signs    Pre Systolic BP Rehab  194   -DM     Pre Treatment Diastolic BP  82  -DM     Post Systolic BP Rehab  173  -DM     Post Treatment Diastolic BP  71  -DM     Pretreatment Heart Rate (beats/min)  64  -DM     Intratreatment Heart Rate (beats/min)  74  -DM     Posttreatment Heart Rate (beats/min)  67  -DM     Pre SpO2 (%)  90  -DM     O2 Delivery Pre Treatment  room air  -DM     Intra SpO2 (%)  86  -DM     O2 Delivery Intra Treatment  room air  -DM     Post SpO2 (%)  89  -DM     O2 Delivery Post Treatment  room air  -DM     Pre Patient Position  Supine  -DM     Intra Patient Position  Standing  -DM     Post Patient Position  Sitting  -DM     Rest Breaks   3  -DM     Row Name 02/11/20 0834          Positioning and Restraints    Pre-Treatment Position  in bed  -DM     Post Treatment Position  chair  -DM     In Chair  notified nsg;reclined;call light within reach;encouraged to call for assist;exit alarm on;with family/caregiver;with other staff;legs elevated  -DM       User Key  (r) = Recorded By, (t) = Taken By, (c) = Cosigned By    Initials Name Provider Type    Lakisha Maher, PT Physical Therapist        Outcome Measures     Row Name 02/11/20 0834          How much help from another person do you currently need...    Turning from your back to your side while in flat bed without using bedrails?  3  -DM     Moving from lying on back to sitting on the side of a flat bed without bedrails?  2  -DM     Moving to and from a bed to a chair (including a wheelchair)?  3  -DM     Standing up from a chair using your arms (e.g., wheelchair, bedside chair)?  3  -DM     Climbing 3-5 steps with a railing?  2  -DM     To walk in hospital room?  3  -DM     AM-PAC 6 Clicks Score (PT)  16  -DM     Row Name 02/11/20 0834          Functional Assessment    Outcome Measure Options  AM-PAC 6 Clicks Basic Mobility (PT)  -DM       User Key  (r) = Recorded By, (t) = Taken By, (c) = Cosigned By    Initials Name Provider Type    Lakisha Maher, PT Physical Therapist           PT Recommendation and Plan     Plan of Care Reviewed With: patient, son  Progress: improving  Outcome Summary: Able to transf to EOB w/ mod A(had LOB post. & to R init.), STS x 2 trials w/ Rwx & min A, amb 6 + 20 ft w/ R wx & min A to guide, w/ 4 min.sit + 2 stand.rests, + performed LE ther exer & bal. activ w/ mult rests d/t desat to 86%, then 87%, on RA, +mod SOB & signif fatigue; BP kept elev per MD to perfuse kidneys;low HGB (11.0); now w/ pure wick cath & will encourage gt to BR     Time Calculation:   PT Charges     Row Name 02/11/20 1009             Time Calculation    Start Time  0834  -DM      PT Received On  02/11/20  -DM      PT Goal Re-Cert Due Date  02/20/20  -DM         Time Calculation- PT    Total Timed Code Minutes- PT  49 minute(s)  -DM         Timed Charges    06386 - PT Therapeutic Exercise Minutes  17  -DM      44175 - Gait Training Minutes   18  -DM      82718 - PT Therapeutic Activity Minutes  14  -DM        User Key  (r) = Recorded By, (t) = Taken By, (c) = Cosigned By    Initials Name Provider Type    Lakisha Maher, PT Physical Therapist        Therapy Charges for Today     Code Description Service Date Service Provider Modifiers Qty    67774180329 HC PT THER PROC EA 15 MIN 2/11/2020 Lakisha Leon, PT GP 1    57566049443 HC GAIT TRAINING EA 15 MIN 2/11/2020 Lakisha Leon, PT GP 1    64929311401 HC PT THERAPEUTIC ACT EA 15 MIN 2/11/2020 Lakisha Leon, PT GP 1          PT G-Codes  Outcome Measure Options: AM-PAC 6 Clicks Basic Mobility (PT)  AM-PAC 6 Clicks Score (PT): 16  AM-PAC 6 Clicks Score (OT): 16    Lakisha Leon PT  2/11/2020

## 2020-02-11 NOTE — PROGRESS NOTES
"   LOS: 3 days    Patient Care Team:  Santiago Kuhn MD as PCP - General (Internal Medicine)        Subjective     Interval History:     Feeling better, No acute events overnight. UOP is improving.     Review of Systems:   No CP or SOA. No edema    Objective     Vital Sign Min/Max for last 24 hours  Temp  Min: 97.7 °F (36.5 °C)  Max: 98.5 °F (36.9 °C)   BP  Min: 165/78  Max: 195/84   Pulse  Min: 56  Max: 77   Resp  Min: 16  Max: 18   SpO2  Min: 90 %  Max: 100 %   No data recorded   No data recorded     Flowsheet Rows      First Filed Value   Admission Height  157.5 cm (62\") Documented at 02/07/2020 2053   Admission Weight  63.5 kg (140 lb) Documented at 02/07/2020 2053          No intake/output data recorded.  I/O last 3 completed shifts:  In: 720 [P.O.:720]  Out: 2050 [Urine:2050]    Physical Exam:     General Appearance:    Alert, cooperative, in no acute distress   Head:    Normocephalic, without obvious abnormality, atraumatic               Neck:   No adenopathy, supple, trachea midline, no thyromegaly, no     carotid bruit, no JVD       Lungs:     Clear to auscultation,respirations regular, even and                   unlabored    Heart:    Regular rhythm and normal rate, normal S1 and S2, no            murmur, no gallop, no rub, no click       Abdomen:     Normal bowel sounds, no masses, no organomegaly, soft        non-tender, non-distended, no guarding, no rebound                 tenderness       Extremities:   Moves all extremities well, no edema, no cyanosis, no              redness               Neurologic:   Cranial nerves 2 - 12 grossly intact,no focal deficit noted       WBC WBC   Date Value Ref Range Status   02/11/2020 13.02 (H) 3.40 - 10.80 10*3/mm3 Final   02/10/2020 14.06 (H) 3.40 - 10.80 10*3/mm3 Final   02/09/2020 10.16 3.40 - 10.80 10*3/mm3 Final      HGB Hemoglobin   Date Value Ref Range Status   02/11/2020 11.0 (L) 12.0 - 15.9 g/dL Final   02/10/2020 12.0 12.0 - 15.9 g/dL Final   02/09/2020 " 11.9 (L) 12.0 - 15.9 g/dL Final      HCT Hematocrit   Date Value Ref Range Status   02/11/2020 32.1 (L) 34.0 - 46.6 % Final   02/10/2020 35.2 34.0 - 46.6 % Final   02/09/2020 35.5 34.0 - 46.6 % Final      Platlets No results found for: LABPLAT   MCV MCV   Date Value Ref Range Status   02/11/2020 83.8 79.0 - 97.0 fL Final   02/10/2020 83.0 79.0 - 97.0 fL Final   02/09/2020 83.7 79.0 - 97.0 fL Final          Sodium Sodium   Date Value Ref Range Status   02/11/2020 139 136 - 145 mmol/L Final   02/10/2020 135 (L) 136 - 145 mmol/L Final   02/09/2020 133 (L) 136 - 145 mmol/L Final   02/09/2020 136 136 - 145 mmol/L Final   02/08/2020 131 (L) 136 - 145 mmol/L Final   02/08/2020 132 (L) 136 - 145 mmol/L Final      Potassium Potassium   Date Value Ref Range Status   02/11/2020 3.1 (L) 3.5 - 5.2 mmol/L Final   02/10/2020 3.9 3.5 - 5.2 mmol/L Final   02/09/2020 3.9 3.5 - 5.2 mmol/L Final   02/09/2020 4.1 3.5 - 5.2 mmol/L Final   02/08/2020 4.7 3.5 - 5.2 mmol/L Final   02/08/2020 4.8 3.5 - 5.2 mmol/L Final      Chloride Chloride   Date Value Ref Range Status   02/11/2020 101 98 - 107 mmol/L Final   02/10/2020 101 98 - 107 mmol/L Final   02/09/2020 100 98 - 107 mmol/L Final   02/09/2020 102 98 - 107 mmol/L Final   02/08/2020 99 98 - 107 mmol/L Final   02/08/2020 104 98 - 107 mmol/L Final      CO2 CO2   Date Value Ref Range Status   02/11/2020 23.0 22.0 - 29.0 mmol/L Final   02/10/2020 19.0 (L) 22.0 - 29.0 mmol/L Final   02/09/2020 18.0 (L) 22.0 - 29.0 mmol/L Final   02/09/2020 19.0 (L) 22.0 - 29.0 mmol/L Final   02/08/2020 14.0 (L) 22.0 - 29.0 mmol/L Final   02/08/2020 14.0 (L) 22.0 - 29.0 mmol/L Final      BUN BUN   Date Value Ref Range Status   02/11/2020 78 (H) 8 - 23 mg/dL Final   02/10/2020 83 (H) 8 - 23 mg/dL Final   02/09/2020 80 (H) 8 - 23 mg/dL Final   02/09/2020 81 (H) 8 - 23 mg/dL Final   02/08/2020 78 (H) 8 - 23 mg/dL Final   02/08/2020 80 (H) 8 - 23 mg/dL Final      Creatinine Creatinine   Date Value Ref Range Status    02/11/2020 3.60 (H) 0.57 - 1.00 mg/dL Final   02/10/2020 4.71 (H) 0.57 - 1.00 mg/dL Final   02/09/2020 5.09 (H) 0.57 - 1.00 mg/dL Final   02/09/2020 5.17 (H) 0.57 - 1.00 mg/dL Final   02/08/2020 5.35 (H) 0.57 - 1.00 mg/dL Final   02/08/2020 5.93 (H) 0.57 - 1.00 mg/dL Final      Calcium Calcium   Date Value Ref Range Status   02/11/2020 8.3 (L) 8.6 - 10.5 mg/dL Final   02/10/2020 8.2 (L) 8.6 - 10.5 mg/dL Final   02/09/2020 7.4 (L) 8.6 - 10.5 mg/dL Final   02/09/2020 7.6 (L) 8.6 - 10.5 mg/dL Final   02/08/2020 7.4 (L) 8.6 - 10.5 mg/dL Final   02/08/2020 7.4 (L) 8.6 - 10.5 mg/dL Final      PO4 No results found for: CAPO4   Albumin No results found for: ALBUMIN   Magnesium Magnesium   Date Value Ref Range Status   02/09/2020 2.0 1.6 - 2.4 mg/dL Final      Uric Acid No results found for: URICACID        Results Review:     I reviewed the patient's new clinical results.      amLODIPine 10 mg Oral Daily   aspirin 81 mg Oral Daily   carvedilol 12.5 mg Oral BID With Meals   heparin (porcine) 5,000 Units Subcutaneous Q12H   hydrALAZINE 10 mg Oral Q8H   insulin lispro 0-14 Units Subcutaneous 4x Daily With Meals & Nightly   insulin lispro 4 Units Subcutaneous TID With Meals   ipratropium-albuterol 3 mL Nebulization 4x Daily - RT   isosorbide mononitrate 60 mg Oral Q24H   latanoprost 1 drop Right Eye Nightly   nystatin  Topical Q12H   povidone-iodine  Topical Daily   timolol 1 drop Both Eyes Q12H        RENAL ULTRASOUND:    FINDINGS: The right kidney measures in length from pole to pole 9.4 cm.  No solid cortical mass or renal cortical cyst seen within the right  kidney. No hydronephrosis or nephrolithiasis.     The left kidney is smaller in size measuring approximate 7.3 cm. There  is no solid cortical mass or renal cortical cyst seen within the left  kidney. No hydronephrosis or nephrolithiasis. Bladder is incompletely  distended and grossly unremarkable in appearance.     IMPRESSION:  Asymmetry seen in the size of the  kidneys with the right  kidney being larger than the left. No gross abnormality seen within  either kidney.    Renal duplex:    FINDINGS: Right kidney measures 10.2 cm in length without gross  hydronephrosis. Peak systolic velocity at the level of the abdominal  aorta measures 61.6 cm/s. Limited visualization, however, parenchymal  resistive indices measure up to 0.75 which are elevated.     Left kidney not clearly visualized and suboptimal due to bowel gas and  overall body habitus.     IMPRESSION:  Limited evaluation due to several factors as detailed above  with nonvisualization of the left kidney. However within limits of the  study, there is noted increased resistive indices within the right  kidney from expected measuring up to 0.75 concern for renal artery  stenosis at least on the right.    Medication Review:     Assessment/Plan       Diabetic acidosis without coma (CMS/HCC)    Diabetes mellitus (CMS/[facility])    Hypertensive emergency    Diarrhea of presumed infectious origin    Essential hypertension    Acute renal failure (ARF) (CMS/[facility])    Type 2 diabetes mellitus with diabetic nephropathy, with long-term current use of insulin (CMS/[facility])    Hyponatremia    Glaucoma    Debility    Metabolic acidosis, increased anion gap    ATN (acute tubular necrosis) (CMS/[facility])    Renal artery stenosis, right    1- SALOMÓN - non oliguric - improving. UOP 1.5 lit/24 hrs. Baseline Scr 1.2. Likely Hypertensive crisis induced vs GN. (serologic workup pending)   2- Metabolic acidosis -DKA  3- Hypertension crisis   4- Hyperkalemia -resolved.   5- Mild hyponatremia  6- Hyperglycemia - improved  7- Proteinuria - P/C ratio 0.4 DANA(-), SFLC 2.57- elevated because of  SALOMÓN    Plan:  - Renal function and UOP improved. No emergent need of dialysis. Continue with current.   - Monitor I/O strictly  - Renal diet.   - Renal duplex - left side not visualized and right side with elevated RI - 0.75 and PSV is less than 180..I will avoid contrast study  for further workup with SALOMÓN. CO 2 angio not available. Renal function improving. Will monitor.   - Will Urine electrophoresis and SIEP as SFLC ratio is elevated but inconclusive because of renal insufficiency.       Alcon Kellogg MD  02/11/20  11:44 AM

## 2020-02-11 NOTE — PROGRESS NOTES
[facility] Medicine Services  PROGRESS NOTE    Patient Name: Varinder Delgado  : 1931  MRN: [id number]    Date of Admission: 2020  Primary Care Physician: Santiago Kuhn MD    Subjective   Subjective     CC:  Follow-up renal failure and DKA    HPI:  Awake this morning.  Doing much better.  Patient reported yesterday afternoon she had transient shortness of breath that improved with Lasix and labetalol.  Denies shortness of breath.  Leg swelling improved.  Family at the bedside.  She is pleased with care so far.       Review of Systems  No current fevers or chills  No current shortness of breath or cough  No current nausea, vomiting, or diarrhea  No current chest pain or palpitations    Objective   Objective     Vital Signs:   Temp:  [98.4 °F (36.9 °C)-98.5 °F (36.9 °C)] 98.4 °F (36.9 °C)  Heart Rate:  [56-77] 56  Resp:  [16-18] 16  BP: (165-195)/() 186/78        Physical Exam:  Constitutional:Awake, alert  HENT: NCAT, mucous membranes moist, neck supple  Respiratory: Clear to auscultation bilaterally, respiratory effort normal, nonlabored breathing   Cardiovascular: RRR, S1, S2, normal radial pulses  Gastrointestinal: Positive bowel sounds, soft, nontender, nondistended  Musculoskeletal: Normal musculature for age, no lower extremity edema, BMI 28  Psychiatric: Appropriate affect, cooperative, conversational  Neurologic: No slurred speech or facial droop, follows commands  Skin: No rashes or jaundice, warm      Results Reviewed:  Results from last 7 days   Lab Units 02/11/20  0407 02/10/20  0347 02/09/20  040   WBC 10*3/mm3 13.02* 14.06* 10.16   HEMOGLOBIN g/dL 11.0* 12.0 11.9*   HEMATOCRIT % 32.1* 35.2 35.5   PLATELETS 10*3/mm3 207 217 206     Results from last 7 days   Lab Units 20  0407 02/10/20  0347 20  0405  20  2300 20  2120   SODIUM mmol/L 139 135* 133*   < > 125* 126*   POTASSIUM mmol/L 3.1* 3.9 3.9   < > 5.8* 6.9*   CHLORIDE mmol/L 101  101 100   < > 91* 91*   CO2 mmol/L 23.0 19.0* 18.0*   < > 13.0* 15.0*   BUN mg/dL 78* 83* 80*   < > 83* 86*   CREATININE mg/dL 3.60* 4.71* 5.09*   < > 6.25* 6.44*   GLUCOSE mg/dL 65 103* 125*   < > 522* 603*   CALCIUM mg/dL 8.3* 8.2* 7.4*   < > 7.8* 8.4*   ALT (SGPT) U/L  --   --   --   --  9 14   AST (SGOT) U/L  --   --   --   --  16 40*   TROPONIN T ng/mL  --   --   --   --  0.029  --     < > = values in this interval not displayed.     Estimated Creatinine Clearance: 9.9 mL/min (A) (by C-G formula based on SCr of 3.6 mg/dL (H)).    Microbiology Results Abnormal     Procedure Component Value - Date/Time    Urine Culture - Urine, Urine, Clean Catch [[id number]]  (Normal) Collected:  02/08/20 0556    Lab Status:  Final result Specimen:  Urine, Clean Catch Updated:  02/09/20 1039     Urine Culture No growth    Eosinophil Smear - Urine, Urine, Clean Catch [[id number]]  (Normal) Collected:  02/08/20 0556    Lab Status:  Final result Specimen:  Urine, Clean Catch Updated:  02/08/20 0919     Eosinophil Smear 0 % EOS/100 Cells     Narrative:       No eosinophil seen          Imaging Results (Last 24 Hours)     Procedure Component Value Units Date/Time     Renal Limited [[id number]] Collected:  02/08/20 1458     Updated:  02/10/20 1825    Narrative:       EXAMINATION: US RENAL LIMITED - 02/08/2020     INDICATION: E13.10-Other specified diabetes mellitus with ketoacidosis  without coma; I16.0-Hypertensive urgency; N19-Unspecified kidney  failure. Renal failure. Renal insufficiency.     TECHNIQUE: Sonographic imaging is obtained of the kidneys in both the  sagittal and transverse planes.     COMPARISON: None.     FINDINGS: The right kidney measures in length from pole to pole 9.4 cm.  No solid cortical mass or renal cortical cyst seen within the right  kidney. No hydronephrosis or nephrolithiasis.     The left kidney is smaller in size measuring approximate 7.3 cm. There  is no solid cortical mass or renal cortical cyst seen  within the left  kidney. No hydronephrosis or nephrolithiasis. Bladder is incompletely  distended and grossly unremarkable in appearance.       Impression:       Asymmetry seen in the size of the kidneys with the right  kidney being larger than the left. No gross abnormality seen within  either kidney.     DICTATED:   02/08/2020  EDITED/ls :   02/09/2020          This report was finalized on 2/10/2020 6:22 PM by Dr. Christelle Mcdonald MD.             Results for orders placed during the hospital encounter of 10/22/18   Adult Transthoracic Echo Limited W/ Cont if Necessary Per Protocol    Narrative · This was a limited echocardiogram to assess left ventricular ejection   fraction.  · Left ventricular systolic function is normal. Estimated EF = 60%.  · Left ventricular wall thickness is consistent with moderate concentric   hypertrophy.          I have reviewed the medications:  Scheduled Meds:    amLODIPine 10 mg Oral Daily   aspirin 81 mg Oral Daily   carvedilol 12.5 mg Oral BID With Meals   heparin (porcine) 5,000 Units Subcutaneous Q12H   hydrALAZINE 10 mg Oral Q8H   insulin lispro 0-14 Units Subcutaneous 4x Daily With Meals & Nightly   insulin lispro 5 Units Subcutaneous TID With Meals   ipratropium-albuterol 3 mL Nebulization 4x Daily - RT   isosorbide mononitrate 60 mg Oral Q24H   latanoprost 1 drop Right Eye Nightly   nystatin  Topical Q12H   potassium chloride 40 mEq Oral Once   povidone-iodine  Topical Daily   timolol 1 drop Both Eyes Q12H     Continuous Infusions:     PRN Meds:.•  acetaminophen  •  dextrose  •  dextrose  •  famotidine  •  glucagon (human recombinant)  •  ipratropium-albuterol  •  labetalol  •  melatonin  •  ondansetron  •  sodium chloride    Assessment/Plan   Assessment & Plan     Active Hospital Problems    Diagnosis  POA   • **Diabetic acidosis without coma (CMS/HCC) [E11.10]  Yes   • Renal artery stenosis, right [I70.1]  Yes   • ATN (acute tubular necrosis) (CMS/HCC) [N17.0]  Yes   •  Diarrhea of presumed infectious origin [R19.7]  Yes   • Essential hypertension [I10]  Yes   • Acute renal failure (ARF) (CMS/[facility]) [N17.9]  Yes   • Type 2 diabetes mellitus with diabetic nephropathy, with long-term current use of insulin (CMS/[facility]) [E11.21, Z79.4]  Not Applicable   • Hyponatremia [E87.1]  Yes   • Glaucoma [H40.9]  Yes   • Debility [R53.81]  Yes   • Metabolic acidosis, increased anion gap [E87.2]  Yes   • Hypertensive emergency [I16.1]  Yes   • Diabetes mellitus (CMS/[facility]) [E11.9]  Yes      Resolved Hospital Problems   No resolved problems to display.        Brief Hospital Course to date:  Varinder Delgado is a 88 y.o. female     Plan today:  Required one-time IV Lasix yesterday for shortness of breath as well as elevated blood pressure.  Glucose reviewed, insulin adjusted today.  Case discussed with nephrology, no catheter needed today, will change n.p.o. to a diet this morning.  Urine output persists.  Plan to discontinue Kohli catheter and placed on pure wick.  Continue off of IV fluids as ATN improves.  Replace potassium today.  Hyponatremia resolved.  Continue labetalol as needed.  Add back low-dose hydralazine for elevated blood pressure but avoid rapid shifts in blood pressure.  Renal artery stenosis needs to be addressed after renal function improves.  Nephrology.  Leukocytosis noted.  Remains afebrile and clinically doing well at this time.  Continue PT OT.  Continue glaucoma drops.  Repeat laboratory studies tomorrow.  Case discussed on multiple summary rounds with case management, pharmacist, and nursing.  Complex case.    I have explained to patient at length that due to advanced age and severity of illness with comorbid conditions patient is at high risk for worsening morbidity or mortality.  She agrees with current treatment plan at this time.  Discussing code status but for now she wishes to stay full.        DVT Prophylaxis:   NALINI    Disposition: I expect the patient to be discharged  location to be determined pending further improvement    CODE STATUS:   Code Status and Medical Interventions:   Ordered at: 02/08/20 0141     Code Status:    CPR     Medical Interventions (Level of Support Prior to Arrest):    Full         Electronically signed by Rubin Babcock MD, 02/11/20, 8:07 AM.

## 2020-02-11 NOTE — PLAN OF CARE
VSS overnight, labetalol given for BP, continues 2L NC, f/c in place, kept NPO for possible ashcath placement in am

## 2020-02-11 NOTE — NURSING NOTE
Called to pt room where pt was labored breathing, with audible rhonchi and wheezing, Notified Dr. Babcock, see new orders.

## 2020-02-11 NOTE — PLAN OF CARE
Problem: Patient Care Overview  Goal: Plan of Care Review  Flowsheets (Taken 2/11/2020 1002)  Progress: improving  Plan of Care Reviewed With: patient; son  Outcome Summary: Able to transf to EOB w/ mod A(had LOB post. & to R init.), STS x 2 trials w/ Rwx & min A, amb 6 + 20 ft w/ R wx & min A to guide, w/ 4 min.sit + 2 stand.rests, + performed LE ther exer & bal. activ w/ mult rests d/t desat to 86%, then 87%, on RA, +mod SOB & signif fatigue; BP kept elev per MD to perfuse kidneys;low HGB (11.0); now w/ pure wick cath & will encourage gt to BR

## 2020-02-12 LAB
ANION GAP SERPL CALCULATED.3IONS-SCNC: 13 MMOL/L (ref 5–15)
BUN BLD-MCNC: 63 MG/DL (ref 8–23)
BUN/CREAT SERPL: 24 (ref 7–25)
CALCIUM SPEC-SCNC: 8.4 MG/DL (ref 8.6–10.5)
CHLORIDE SERPL-SCNC: 103 MMOL/L (ref 98–107)
CO2 SERPL-SCNC: 22 MMOL/L (ref 22–29)
CREAT BLD-MCNC: 2.63 MG/DL (ref 0.57–1)
DEPRECATED RDW RBC AUTO: 44.2 FL (ref 37–54)
ERYTHROCYTE [DISTWIDTH] IN BLOOD BY AUTOMATED COUNT: 14.5 % (ref 12.3–15.4)
GBM IGG SER-ACNC: 3 UNITS (ref 0–20)
GFR SERPL CREATININE-BSD FRML MDRD: 21 ML/MIN/1.73
GLUCOSE BLD-MCNC: 166 MG/DL (ref 65–99)
GLUCOSE BLDC GLUCOMTR-MCNC: 145 MG/DL (ref 70–130)
GLUCOSE BLDC GLUCOMTR-MCNC: 151 MG/DL (ref 70–130)
GLUCOSE BLDC GLUCOMTR-MCNC: 195 MG/DL (ref 70–130)
GLUCOSE BLDC GLUCOMTR-MCNC: 326 MG/DL (ref 70–130)
HCT VFR BLD AUTO: 32.4 % (ref 34–46.6)
HGB BLD-MCNC: 10.8 G/DL (ref 12–15.9)
MCH RBC QN AUTO: 28.6 PG (ref 26.6–33)
MCHC RBC AUTO-ENTMCNC: 33.3 G/DL (ref 31.5–35.7)
MCV RBC AUTO: 85.9 FL (ref 79–97)
PLATELET # BLD AUTO: 209 10*3/MM3 (ref 140–450)
PMV BLD AUTO: 12 FL (ref 6–12)
POTASSIUM BLD-SCNC: 3.6 MMOL/L (ref 3.5–5.2)
RBC # BLD AUTO: 3.77 10*6/MM3 (ref 3.77–5.28)
SODIUM BLD-SCNC: 138 MMOL/L (ref 136–145)
WBC NRBC COR # BLD: 10.93 10*3/MM3 (ref 3.4–10.8)

## 2020-02-12 PROCEDURE — 94799 UNLISTED PULMONARY SVC/PX: CPT

## 2020-02-12 PROCEDURE — 97110 THERAPEUTIC EXERCISES: CPT

## 2020-02-12 PROCEDURE — 80048 BASIC METABOLIC PNL TOTAL CA: CPT | Performed by: INTERNAL MEDICINE

## 2020-02-12 PROCEDURE — 25010000002 HEPARIN (PORCINE) PER 1000 UNITS: Performed by: INTERNAL MEDICINE

## 2020-02-12 PROCEDURE — 63710000001 INSULIN LISPRO (HUMAN) PER 5 UNITS: Performed by: INTERNAL MEDICINE

## 2020-02-12 PROCEDURE — 99233 SBSQ HOSP IP/OBS HIGH 50: CPT | Performed by: INTERNAL MEDICINE

## 2020-02-12 PROCEDURE — 85027 COMPLETE CBC AUTOMATED: CPT | Performed by: INTERNAL MEDICINE

## 2020-02-12 PROCEDURE — 97116 GAIT TRAINING THERAPY: CPT

## 2020-02-12 PROCEDURE — 82962 GLUCOSE BLOOD TEST: CPT

## 2020-02-12 RX ORDER — HYDRALAZINE HYDROCHLORIDE 25 MG/1
25 TABLET, FILM COATED ORAL EVERY 8 HOURS SCHEDULED
Status: DISCONTINUED | OUTPATIENT
Start: 2020-02-12 | End: 2020-02-14 | Stop reason: HOSPADM

## 2020-02-12 RX ADMIN — TIMOLOL MALEATE 1 DROP: 5 SOLUTION/ DROPS OPHTHALMIC at 20:34

## 2020-02-12 RX ADMIN — LATANOPROST 1 DROP: 50 SOLUTION OPHTHALMIC at 20:34

## 2020-02-12 RX ADMIN — HYDRALAZINE HYDROCHLORIDE 10 MG: 10 TABLET ORAL at 05:09

## 2020-02-12 RX ADMIN — ISOSORBIDE MONONITRATE 60 MG: 60 TABLET, EXTENDED RELEASE ORAL at 08:57

## 2020-02-12 RX ADMIN — CARVEDILOL 12.5 MG: 12.5 TABLET, FILM COATED ORAL at 08:56

## 2020-02-12 RX ADMIN — HEPARIN SODIUM 5000 UNITS: 5000 INJECTION, SOLUTION INTRAVENOUS; SUBCUTANEOUS at 08:56

## 2020-02-12 RX ADMIN — AMLODIPINE BESYLATE 10 MG: 10 TABLET ORAL at 08:57

## 2020-02-12 RX ADMIN — IPRATROPIUM BROMIDE AND ALBUTEROL SULFATE 3 ML: 2.5; .5 SOLUTION RESPIRATORY (INHALATION) at 15:53

## 2020-02-12 RX ADMIN — INSULIN LISPRO 10 UNITS: 100 INJECTION, SOLUTION INTRAVENOUS; SUBCUTANEOUS at 16:49

## 2020-02-12 RX ADMIN — HEPARIN SODIUM 5000 UNITS: 5000 INJECTION, SOLUTION INTRAVENOUS; SUBCUTANEOUS at 20:34

## 2020-02-12 RX ADMIN — INSULIN LISPRO 4 UNITS: 100 INJECTION, SOLUTION INTRAVENOUS; SUBCUTANEOUS at 16:49

## 2020-02-12 RX ADMIN — INSULIN LISPRO 3 UNITS: 100 INJECTION, SOLUTION INTRAVENOUS; SUBCUTANEOUS at 20:44

## 2020-02-12 RX ADMIN — HYDRALAZINE HYDROCHLORIDE 25 MG: 25 TABLET, FILM COATED ORAL at 14:57

## 2020-02-12 RX ADMIN — POVIDONE-IODINE: 10 SOLUTION TOPICAL at 08:59

## 2020-02-12 RX ADMIN — INSULIN LISPRO 4 UNITS: 100 INJECTION, SOLUTION INTRAVENOUS; SUBCUTANEOUS at 08:55

## 2020-02-12 RX ADMIN — ASPIRIN 81 MG CHEWABLE TABLET 81 MG: 81 TABLET CHEWABLE at 08:56

## 2020-02-12 RX ADMIN — TIMOLOL MALEATE 1 DROP: 5 SOLUTION/ DROPS OPHTHALMIC at 08:55

## 2020-02-12 RX ADMIN — CARVEDILOL 12.5 MG: 12.5 TABLET, FILM COATED ORAL at 16:52

## 2020-02-12 RX ADMIN — IPRATROPIUM BROMIDE AND ALBUTEROL SULFATE 3 ML: 2.5; .5 SOLUTION RESPIRATORY (INHALATION) at 09:10

## 2020-02-12 RX ADMIN — INSULIN LISPRO 3 UNITS: 100 INJECTION, SOLUTION INTRAVENOUS; SUBCUTANEOUS at 08:58

## 2020-02-12 RX ADMIN — IPRATROPIUM BROMIDE AND ALBUTEROL SULFATE 3 ML: 2.5; .5 SOLUTION RESPIRATORY (INHALATION) at 21:01

## 2020-02-12 RX ADMIN — HYDRALAZINE HYDROCHLORIDE 25 MG: 25 TABLET, FILM COATED ORAL at 20:34

## 2020-02-12 RX ADMIN — INSULIN LISPRO 4 UNITS: 100 INJECTION, SOLUTION INTRAVENOUS; SUBCUTANEOUS at 11:56

## 2020-02-12 RX ADMIN — SODIUM CHLORIDE, PRESERVATIVE FREE 10 ML: 5 INJECTION INTRAVENOUS at 08:56

## 2020-02-12 RX ADMIN — NYSTATIN: 100000 POWDER TOPICAL at 08:57

## 2020-02-12 RX ADMIN — NYSTATIN: 100000 POWDER TOPICAL at 20:34

## 2020-02-12 NOTE — PLAN OF CARE
Pts CR continues to improve. UOP improving approximately 400mL voided on shift so far. BP has ranged between 140s-170s. Hydralazine increased. Pt awaiting placement for rehab. Will cont to monitor.

## 2020-02-12 NOTE — PLAN OF CARE
Problem: Patient Care Overview  Goal: Plan of Care Review  Outcome: Ongoing (interventions implemented as appropriate)  Flowsheets (Taken 2/12/2020 1046)  Progress: improving  Plan of Care Reviewed With: patient  Outcome Summary: patient ambulated 15 feet with Min assist x1 and roilling walker for support, cues to stay inside walker and step sequence. Distance limited by weakness and fatigue.

## 2020-02-12 NOTE — PROGRESS NOTES
Discharge Planning Assessment  [facility]     Patient Name: Varinder Delgado  MRN: [id number]  Today's Date: 2/12/2020    Admit Date: 2/7/2020    Discharge Needs Assessment    No documentation.       Discharge Plan     Row Name 02/12/20 1140       Plan    Plan  SNF    Patient/Family in Agreement with Plan  yes    Plan Comments  [facility] declined.  [facility] cont to follow.  Add's SNF referrals called to [facility], [facility], [facility], [facility], and [place] [facility], per discussions.  Will need precert.  CM will cont to follow.        Destination      Service Provider Request Status Selected Services Address Phone Number Fax Number    [facility] Pending - Request Sent N/A [address] [place] [address] [phone]    [facility] Pending - No Request Sent N/A [address] [place] [address] [phone]    [facility] Pending - No Request Sent N/A [address] [place] [id number] [phone]    [facility] Pending - No Request Sent N/A [address] [place] [address] [phone]    [facility] Pending - No Request Sent N/A [address] [place] [address] [phone]    [facility] Pending - No Request Sent N/A [address] [place] [place] [address] [phone]    [facility] Declined  Unable to Meet Patient Needs N/A [address] [place] [id number] [phone]    [facility]-SIGN[facility] Declined  No Bed Available N/A [address] [id number] [phone]    [facility] S[facility] Declined  No Bed Available N/A [address] [facility] [phone]      Durable Medical Equipment      Coordination has not been started for this encounter.       Dialysis/Infusion      Coordination has not been started for this encounter.      Home Medical Care      Coordination has not been started for this encounter.      Therapy      Coordination has not been started for this encounter.      Community Resources      Coordination has not been started for this encounter.        Expected Discharge Date and Time     Expected Discharge Date Expected Discharge Time    Feb 13, 2020         Demographic Summary    No documentation.       Functional Status    No documentation.       Psychosocial    No documentation.       Abuse/Neglect    No documentation.       Legal    No documentation.       Substance Abuse    No documentation.       Patient Forms    No documentation.           Tiffanie Contreras RN

## 2020-02-12 NOTE — THERAPY TREATMENT NOTE
Patient Name: Varinder Delgado  : 1931    MRN: [id number]                              Today's Date: 2020       Admit Date: 2020    Visit Dx:     ICD-10-CM ICD-9-CM   1. Diabetic ketoacidosis without coma associated with other specified diabetes mellitus (CMS/HCC) E13.10 250.12   2. Hypertensive urgency I16.0 401.9   3. Renal failure, unspecified chronicity N19 586   4. Impaired mobility and ADLs Z74.09 799.89     Patient Active Problem List   Diagnosis   • Essential hypertension   • Diabetes mellitus (CMS/[facility])   • Mixed hyperlipidemia   • Tobacco abuse   • Hypertensive emergency   • Coronary artery disease involving native coronary artery of native heart without angina pectoris   • Claudication (CMS/[facility])   • Lower extremity edema   • Diabetic acidosis without coma (CMS/[facility])   • Diarrhea of presumed infectious origin   • Essential hypertension   • Acute renal failure (ARF) (CMS/[facility])   • Type 2 diabetes mellitus with diabetic nephropathy, with long-term current use of insulin (CMS/[facility])   • Hyponatremia   • Glaucoma   • Debility   • Metabolic acidosis, increased anion gap   • ATN (acute tubular necrosis) (CMS/[facility])   • Renal artery stenosis, right     Past Medical History:   Diagnosis Date   • Diabetes mellitus (CMS/[facility])    • Hyperlipidemia    • Hypertension    • MI (myocardial infarction) (CMS/[facility])      Past Surgical History:   Procedure Laterality Date   • CARDIAC CATHETERIZATION N/A 2018    Procedure: Left Heart Cath;  Surgeon: Sreekanth De La Rosa MD;  Location: [facility] CATH INVASIVE LOCATION;  Service: Cardiology   • MOUTH SURGERY       General Information     Row Name 20 1034          PT Evaluation Time/Intention    Document Type  therapy note (daily note)  -AS     Mode of Treatment  physical therapy  -AS     Row Name 20 1034          General Information    Patient Profile Reviewed?  yes  -AS     Existing Precautions/Restrictions  fall  -AS     Barriers to Rehab  visual deficit Glaucoma   -AS     [facility] Name 02/12/20 1034          Cognitive Assessment/Intervention- PT/OT    Orientation Status (Cognition)  oriented to;person  -AS     [facility] Name 02/12/20 1034          Safety Issues, Functional Mobility    Safety Issues Affecting Function (Mobility)  safety precaution awareness;safety precautions follow-through/compliance;judgment  -AS     Impairments Affecting Function (Mobility)  balance;cognition;endurance/activity tolerance;shortness of breath;strength  -AS       User Key  (r) = Recorded By, (t) = Taken By, (c) = Cosigned By    Initials Name Provider Type    AS Radha Espinoza PTA Physical Therapy Assistant        Mobility     [facility] Name 02/12/20 1035          Bed Mobility Assessment/Treatment    Supine-Sit [place] (Bed Mobility)  verbal cues;minimum assist (75% patient effort)  -AS     Assistive Device (Bed Mobility)  bed rails;draw sheet;head of bed elevated  -AS     Comment (Bed Mobility)  cues for sequencing and using bedrail for support, assist to scoot forward  -AS     Row Name 02/12/20 1035          Transfer Assessment/Treatment    Comment (Transfers)  cues for hadn and feet placement  -AS     Row Name 02/12/20 1035          Bed-Chair Transfer    Bed-Chair [place] (Transfers)  verbal cues;minimum assist (75% patient effort)  -AS     Assistive Device (Bed-Chair Transfers)  walker, front-wheeled  -AS     Row Name 02/12/20 1035          Sit-Stand Transfer    Sit-Stand [place] (Transfers)  verbal cues;minimum assist (75% patient effort)  -AS     Assistive Device (Sit-Stand Transfers)  walker, front-wheeled  -AS     Row Name 02/12/20 1035          Gait/Stairs Assessment/Training    Gait/Stairs Assessment/Training  gait/ambulation assistive device  -AS     [place] Level (Gait)  verbal cues;minimum assist (75% patient effort)  -AS     Assistive Device (Gait)  walker, front-wheeled  -AS     Distance in Feet (Gait)  15  -AS     Pattern (Gait)  step-through  -AS      Deviations/Abnormal Patterns (Gait)  bilateral deviations;neil decreased;gait speed decreased  -AS     Bilateral Gait Deviations  forward flexed posture;weight shift ability decreased  -AS     Comment (Gait/Stairs)  patient ambulated 15 feet with Min assist x1 and roilling walker for support, cues to stay inside walker and step sequence. Distance limited by weakness and fatigue.  -AS       User Key  (r) = Recorded By, (t) = Taken By, (c) = Cosigned By    Initials Name Provider Type    AS Radha Espinoza PTA Physical Therapy Assistant        Obj/Interventions     Row Name 02/12/20 1045          Therapeutic Exercise    Lower Extremity (Therapeutic Exercise)  LAQ (long arc quad), bilateral;marching while seated  -AS     Lower Extremity Range of Motion (Therapeutic Exercise)  ankle dorsiflexion/plantar flexion, bilateral  -AS     Exercise Type (Therapeutic Exercise)  AROM (active range of motion)  -AS     Position (Therapeutic Exercise)  seated  -AS     Sets/Reps (Therapeutic Exercise)  1/10  -AS       User Key  (r) = Recorded By, (t) = Taken By, (c) = Cosigned By    Initials Name Provider Type    AS Radha Espinoza PTA Physical Therapy Assistant        Goals/Plan    No documentation.       Clinical Impression     Row Name 02/12/20 1045          Pain Assessment    Additional Documentation  Pain Scale: Numbers Pre/Post-Treatment (Group)  -AS     Row Name 02/12/20 1045          Pain Scale: Numbers Pre/Post-Treatment    Pain Scale: Numbers, Pretreatment  0/10 - no pain  -AS     Pain Scale: Numbers, Post-Treatment  0/10 - no pain  -AS       User Key  (r) = Recorded By, (t) = Taken By, (c) = Cosigned By    Initials Name Provider Type    AS Radha Espinoza PTA Physical Therapy Assistant        Outcome Measures     Row Name 02/12/20 1045          How much help from another person do you currently need...    Turning from your back to your side while in flat bed without using bedrails?  3  -AS     Moving from  lying on back to sitting on the side of a flat bed without bedrails?  3  -AS     Moving to and from a bed to a chair (including a wheelchair)?  3  -AS     Standing up from a chair using your arms (e.g., wheelchair, bedside chair)?  3  -AS     Climbing 3-5 steps with a railing?  2  -AS     To walk in hospital room?  3  -AS     AM-PAC 6 Clicks Score (PT)  17  -AS     Row Name 02/12/20 1045          Functional Assessment    Outcome Measure Options  AM-PAC 6 Clicks Basic Mobility (PT)  -AS       User Key  (r) = Recorded By, (t) = Taken By, (c) = Cosigned By    Initials Name Provider Type    AS Radha Espinoza PTA Physical Therapy Assistant          PT Recommendation and Plan     Plan of Care Reviewed With: patient  Progress: improving  Outcome Summary: patient ambulated 15 feet with Min assist x1 and roilling walker for support, cues to stay inside walker and step sequence. Distance limited by weakness and fatigue.     Time Calculation:   PT Charges     Row Name 02/12/20 1047             Time Calculation    Start Time  0920  -AS      PT Received On  02/12/20  -AS      PT Goal Re-Cert Due Date  02/20/20  -AS         Timed Charges    95052 - PT Therapeutic Exercise Minutes  8  -AS      99844 - Gait Training Minutes   15  -AS        User Key  (r) = Recorded By, (t) = Taken By, (c) = Cosigned By    Initials Name Provider Type    AS Radha Espinoza PTA Physical Therapy Assistant        Therapy Charges for Today     Code Description Service Date Service Provider Modifiers Qty    69988095680 HC PT THER PROC EA 15 MIN 2/12/2020 Radha Espinoza PTA GP 1    15071375113 HC GAIT TRAINING EA 15 MIN 2/12/2020 Radha Espinoza PTA GP 1          PT G-Codes  Outcome Measure Options: AM-PAC 6 Clicks Basic Mobility (PT)  AM-PAC 6 Clicks Score (PT): 17  AM-PAC 6 Clicks Score (OT): 16    Radha Espinoza PTA  2/12/2020

## 2020-02-12 NOTE — PROGRESS NOTES
[facility] Medicine Services  PROGRESS NOTE    Patient Name: Varinder Delgado  : 1931  MRN: [id number]    Date of Admission: 2020  Primary Care Physician: Santiago Kuhn MD    Subjective   Subjective     CC:  Follow-up renal failure and DKA    HPI:  Good urine output continues.  Breathing continues to be improved.  Leg swelling continues to be improved.  Patient is very pleased with her progress and glad she is avoiding dialysis so far.  Family is at the bedside they are also pleased.  No other new complaints.      Review of Systems  No current fevers or chills  No current shortness of breath or cough  No current nausea, vomiting, or diarrhea  No current chest pain or palpitations    Objective   Objective     Vital Signs:   Temp:  [97.7 °F (36.5 °C)-98.9 °F (37.2 °C)] 97.9 °F (36.6 °C)  Heart Rate:  [57-70] 70  Resp:  [18-21] 18  BP: (161-199)/(67-88) 186/80        Physical Exam:  Constitutional:Awake, alert  HENT: NCAT, mucous membranes moist, neck supple  Respiratory: Clear to auscultation bilaterally, respiratory effort normal, nonlabored breathing   Cardiovascular: RRR, S1, S2, normal radial pulses  Gastrointestinal: Positive bowel sounds, soft, nontender, nondistended  Musculoskeletal: Elderly in appearance but relatively normal musculature for age, minimal lower extremity edema, BMI 28  Psychiatric: Appropriate affect, cooperative, conversational  Neurologic: No slurred speech or facial droop, follows commands  Skin: No rashes or jaundice, warm  : Pure wick in place      Results Reviewed:  Results from last 7 days   Lab Units 02/12/20  0458 02/11/20  0407 02/10/20  0347   WBC 10*3/mm3 10.93* 13.02* 14.06*   HEMOGLOBIN g/dL 10.8* 11.0* 12.0   HEMATOCRIT % 32.4* 32.1* 35.2   PLATELETS 10*3/mm3 209 207 217     Results from last 7 days   Lab Units 20  0458 20  0407 02/10/20  0347  20  2300 20  2120   SODIUM mmol/L 138 139 135*   < > 125* 126*      POTASSIUM mmol/L 3.6 3.1* 3.9   < > 5.8* 6.9*   CHLORIDE mmol/L 103 101 101   < > 91* 91*   CO2 mmol/L 22.0 23.0 19.0*   < > 13.0* 15.0*   BUN mg/dL 63* 78* 83*   < > 83* 86*   CREATININE mg/dL 2.63* 3.60* 4.71*   < > 6.25* 6.44*   GLUCOSE mg/dL 166* 65 103*   < > 522* 603*   CALCIUM mg/dL 8.4* 8.3* 8.2*   < > 7.8* 8.4*   ALT (SGPT) U/L  --   --   --   --  9 14   AST (SGOT) U/L  --   --   --   --  16 40*   TROPONIN T ng/mL  --   --   --   --  0.029  --     < > = values in this interval not displayed.     Estimated Creatinine Clearance: 13.6 mL/min (A) (by C-G formula based on SCr of 2.63 mg/dL (H)).    Microbiology Results Abnormal     Procedure Component Value - Date/Time    Urine Culture - Urine, Urine, Clean Catch [[id number]]  (Normal) Collected:  02/08/20 0556    Lab Status:  Final result Specimen:  Urine, Clean Catch Updated:  02/09/20 1039     Urine Culture No growth    Eosinophil Smear - Urine, Urine, Clean Catch [[id number]]  (Normal) Collected:  02/08/20 0556    Lab Status:  Final result Specimen:  Urine, Clean Catch Updated:  02/08/20 0919     Eosinophil Smear 0 % EOS/100 Cells     Narrative:       No eosinophil seen          Imaging Results (Last 24 Hours)     ** No results found for the last 24 hours. **          Results for orders placed during the hospital encounter of 10/22/18   Adult Transthoracic Echo Limited W/ Cont if Necessary Per Protocol    Narrative · This was a limited echocardiogram to assess left ventricular ejection   fraction.  · Left ventricular systolic function is normal. Estimated EF = 60%.  · Left ventricular wall thickness is consistent with moderate concentric   hypertrophy.          I have reviewed the medications:  Scheduled Meds:    amLODIPine 10 mg Oral Daily   aspirin 81 mg Oral Daily   carvedilol 12.5 mg Oral BID With Meals   heparin (porcine) 5,000 Units Subcutaneous Q12H   hydrALAZINE 10 mg Oral Q8H   insulin lispro 0-14 Units Subcutaneous 4x Daily With Meals & Nightly    insulin lispro 4 Units Subcutaneous TID With Meals   ipratropium-albuterol 3 mL Nebulization 4x Daily - RT   isosorbide mononitrate 60 mg Oral Q24H   latanoprost 1 drop Right Eye Nightly   nystatin  Topical Q12H   povidone-iodine  Topical Daily   timolol 1 drop Both Eyes Q12H     Continuous Infusions:     PRN Meds:.•  acetaminophen  •  dextrose  •  dextrose  •  famotidine  •  glucagon (human recombinant)  •  ipratropium-albuterol  •  labetalol  •  melatonin  •  ondansetron  •  sodium chloride    Assessment/Plan   Assessment & Plan     Active Hospital Problems    Diagnosis  POA   • **Diabetic acidosis without coma (CMS/HCC) [E11.10]  Yes   • Renal artery stenosis, right [I70.1]  Yes   • ATN (acute tubular necrosis) (CMS/[facility]) [N17.0]  Yes   • Diarrhea of presumed infectious origin [R19.7]  Yes   • Essential hypertension [I10]  Yes   • Acute renal failure (ARF) (CMS/[facility]) [N17.9]  Yes   • Type 2 diabetes mellitus with diabetic nephropathy, with long-term current use of insulin (CMS/[facility]) [E11.21, Z79.4]  Not Applicable   • Hyponatremia [E87.1]  Yes   • Glaucoma [H40.9]  Yes   • Debility [R53.81]  Yes   • Metabolic acidosis, increased anion gap [E87.2]  Yes   • Hypertensive emergency [I16.1]  Yes   • Diabetes mellitus (CMS/HCC) [E11.9]  Yes      Resolved Hospital Problems   No resolved problems to display.        Brief Hospital Course to date:  Varinder Delgado is a 88 y.o. female with past medical history of insulin-dependent diabetes mellitus who presents to the hospital with diabetic ketoacidosis and acute renal failure in the setting of recent diarrheal illness that is now resolved.  She was found to have probable ATN due to dehydration from diabetic ketoacidosis.  Nephrology consult team has followed and assisted with management.  She has started to have gradual recovery of her renal function.  She also had hypertensive emergency and required Cardene drip earlier in hospital stay.  Renal ultrasound is concerning  for renal artery stenosis on the right.    Plan : As per below.  Hydralazine further increased from 10 mg 3 times daily to 25 mg 3 times daily.  Renal function continues to improve.  Repeat laboratory studies tomorrow.  Case has been discussed with nephrology.  Pleased with progress.  Case management working on rehab.    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  Monitoring glucose daily and adjusting insulin as needed.    Acute renal failure due to ATN:  Likely prolonged prerenal failure from diarrhea caused ATN.  Nephrology following.  Thus far patient has been able to avoid dialysis and renal function is improving.  Continue supportive care and avoid hypotension.  Patient has had some intermittent volume overload that has required Lasix.    Right renal artery stenosis:  Noted on ultrasound.  Nephrology recommends to avoid hypotension and hold off further work-up or contrast until renal failure further improved.  This will need to be addressed at a later date.    Hyponatremia:  Improved with IV fluids and renal recovery.    Debility: PT OT.  Recommend rehab at discharge.    Essential hypertension:  Avoid hypotension as per above due to renal failure and renal artery stenosis.  Gradual titration of medications.    DVT Prophylaxis:   NALINI    Disposition: I expect the patient to be discharged location to be determined pending further improvement    CODE STATUS:   Code Status and Medical Interventions:   Ordered at: 02/08/20 0141     Code Status:    CPR     Medical Interventions (Level of Support Prior to Arrest):    Full         Electronically signed by Rubin Babcock MD, 02/12/20, 8:29 AM.

## 2020-02-12 NOTE — PROGRESS NOTES
"   LOS: 4 days    Patient Care Team:  Santiago Kuhn MD as PCP - General (Internal Medicine)        Subjective     Interval History:     Feeling better, No acute events overnight. UOP is improving.     Review of Systems:   No CP or SOA. No edema    Objective     Vital Sign Min/Max for last 24 hours  Temp  Min: 97.7 °F (36.5 °C)  Max: 98.9 °F (37.2 °C)   BP  Min: 161/69  Max: 199/88   Pulse  Min: 57  Max: 70   Resp  Min: 18  Max: 21   SpO2  Min: 87 %  Max: 94 %   No data recorded   No data recorded     Flowsheet Rows      First Filed Value   Admission Height  157.5 cm (62\") Documented at 02/07/2020 2053   Admission Weight  63.5 kg (140 lb) Documented at 02/07/2020 2053          No intake/output data recorded.  I/O last 3 completed shifts:  In: -   Out: 1700 [Urine:1700]    Physical Exam:     General Appearance:    Alert, cooperative, in no acute distress   Head:    Normocephalic, without obvious abnormality, atraumatic               Neck:   No adenopathy, supple, trachea midline, no thyromegaly, no     carotid bruit, no JVD       Lungs:     Clear to auscultation,respirations regular, even and                   unlabored    Heart:    Regular rhythm and normal rate, normal S1 and S2, no            murmur, no gallop, no rub, no click       Abdomen:     Normal bowel sounds, no masses, no organomegaly, soft        non-tender, non-distended, no guarding, no rebound                 tenderness       Extremities:   Moves all extremities well, no edema, no cyanosis, no              redness               Neurologic:   Cranial nerves 2 - 12 grossly intact,no focal deficit noted       WBC WBC   Date Value Ref Range Status   02/12/2020 10.93 (H) 3.40 - 10.80 10*3/mm3 Final   02/11/2020 13.02 (H) 3.40 - 10.80 10*3/mm3 Final   02/10/2020 14.06 (H) 3.40 - 10.80 10*3/mm3 Final      HGB Hemoglobin   Date Value Ref Range Status   02/12/2020 10.8 (L) 12.0 - 15.9 g/dL Final   02/11/2020 11.0 (L) 12.0 - 15.9 g/dL Final   02/10/2020 12.0 " 12.0 - 15.9 g/dL Final      HCT Hematocrit   Date Value Ref Range Status   02/12/2020 32.4 (L) 34.0 - 46.6 % Final   02/11/2020 32.1 (L) 34.0 - 46.6 % Final   02/10/2020 35.2 34.0 - 46.6 % Final      Platlets No results found for: LABPLAT   MCV MCV   Date Value Ref Range Status   02/12/2020 85.9 79.0 - 97.0 fL Final   02/11/2020 83.8 79.0 - 97.0 fL Final   02/10/2020 83.0 79.0 - 97.0 fL Final          Sodium Sodium   Date Value Ref Range Status   02/12/2020 138 136 - 145 mmol/L Final   02/11/2020 139 136 - 145 mmol/L Final   02/10/2020 135 (L) 136 - 145 mmol/L Final      Potassium Potassium   Date Value Ref Range Status   02/12/2020 3.6 3.5 - 5.2 mmol/L Final   02/11/2020 3.1 (L) 3.5 - 5.2 mmol/L Final   02/10/2020 3.9 3.5 - 5.2 mmol/L Final      Chloride Chloride   Date Value Ref Range Status   02/12/2020 103 98 - 107 mmol/L Final   02/11/2020 101 98 - 107 mmol/L Final   02/10/2020 101 98 - 107 mmol/L Final      CO2 CO2   Date Value Ref Range Status   02/12/2020 22.0 22.0 - 29.0 mmol/L Final   02/11/2020 23.0 22.0 - 29.0 mmol/L Final   02/10/2020 19.0 (L) 22.0 - 29.0 mmol/L Final      BUN BUN   Date Value Ref Range Status   02/12/2020 63 (H) 8 - 23 mg/dL Final   02/11/2020 78 (H) 8 - 23 mg/dL Final   02/10/2020 83 (H) 8 - 23 mg/dL Final      Creatinine Creatinine   Date Value Ref Range Status   02/12/2020 2.63 (H) 0.57 - 1.00 mg/dL Final   02/11/2020 3.60 (H) 0.57 - 1.00 mg/dL Final   02/10/2020 4.71 (H) 0.57 - 1.00 mg/dL Final      Calcium Calcium   Date Value Ref Range Status   02/12/2020 8.4 (L) 8.6 - 10.5 mg/dL Final   02/11/2020 8.3 (L) 8.6 - 10.5 mg/dL Final   02/10/2020 8.2 (L) 8.6 - 10.5 mg/dL Final      PO4 No results found for: CAPO4   Albumin No results found for: ALBUMIN   Magnesium No results found for: MG   Uric Acid No results found for: URICACID        Results Review:     I reviewed the patient's new clinical results.      amLODIPine 10 mg Oral Daily   aspirin 81 mg Oral Daily   carvedilol 12.5 mg  Oral BID With Meals   heparin (porcine) 5,000 Units Subcutaneous Q12H   hydrALAZINE 25 mg Oral Q8H   insulin lispro 0-14 Units Subcutaneous 4x Daily With Meals & Nightly   insulin lispro 4 Units Subcutaneous TID With Meals   ipratropium-albuterol 3 mL Nebulization 4x Daily - RT   isosorbide mononitrate 60 mg Oral Q24H   latanoprost 1 drop Right Eye Nightly   nystatin  Topical Q12H   povidone-iodine  Topical Daily   timolol 1 drop Both Eyes Q12H        RENAL ULTRASOUND:    FINDINGS: The right kidney measures in length from pole to pole 9.4 cm.  No solid cortical mass or renal cortical cyst seen within the right  kidney. No hydronephrosis or nephrolithiasis.     The left kidney is smaller in size measuring approximate 7.3 cm. There  is no solid cortical mass or renal cortical cyst seen within the left  kidney. No hydronephrosis or nephrolithiasis. Bladder is incompletely  distended and grossly unremarkable in appearance.     IMPRESSION:  Asymmetry seen in the size of the kidneys with the right  kidney being larger than the left. No gross abnormality seen within  either kidney.    Renal duplex:    FINDINGS: Right kidney measures 10.2 cm in length without gross  hydronephrosis. Peak systolic velocity at the level of the abdominal  aorta measures 61.6 cm/s. Limited visualization, however, parenchymal  resistive indices measure up to 0.75 which are elevated.     Left kidney not clearly visualized and suboptimal due to bowel gas and  overall body habitus.     IMPRESSION:  Limited evaluation due to several factors as detailed above  with nonvisualization of the left kidney. However within limits of the  study, there is noted increased resistive indices within the right  kidney from expected measuring up to 0.75 concern for renal artery  stenosis at least on the right.    Medication Review:     Assessment/Plan       Diabetic acidosis without coma (CMS/HCC)    Diabetes mellitus (CMS/HCC)    Hypertensive emergency    Diarrhea  of presumed infectious origin    Essential hypertension    Acute renal failure (ARF) (CMS/[facility])    Type 2 diabetes mellitus with diabetic nephropathy, with long-term current use of insulin (CMS/[facility])    Hyponatremia    Glaucoma    Debility    Metabolic acidosis, increased anion gap    ATN (acute tubular necrosis) (CMS/[facility])    Renal artery stenosis, right    1- SALOMÓN - non oliguric - improving. UOP 1.5 lit/24 hrs. Baseline Scr 1.2. Likely Hypertensive crisis induced vs GN.   2- Metabolic acidosis -DKA  3- Hypertension crisis -  4- Hyperkalemia -resolved.   5- Mild hyponatremia  6- Hyperglycemia - improved  7- Proteinuria - P/C ratio 0.4 DANA(-), ANCA (-), SFLC 2.57- elevated because of  SALOMÓN    Plan:  - Renal function and UOP improved. No emergent need of dialysis. Continue with current.   - Hydralazine dose increased today.   - Monitor I/O strictly  - Renal diet.   - Renal duplex - left side not visualized and right side with elevated RI - 0.75 and PSV is less than 180..I will avoid contrast study for further workup with SALOMÓN. CO 2 angio not available. Renal function improving. Will monitor.   - Will Urine electrophoresis and SIEP pending. SFLC ratio is elevated but inconclusive because of renal insufficiency.       Alcon Kellogg MD  02/12/20  8:41 AM

## 2020-02-13 LAB
ALBUMIN SERPL-MCNC: 2.7 G/DL (ref 2.9–4.4)
ALBUMIN/GLOB SERPL: 0.9 {RATIO} (ref 0.7–1.7)
ALPHA1 GLOB FLD ELPH-MCNC: 0.4 G/DL (ref 0–0.4)
ALPHA2 GLOB SERPL ELPH-MCNC: 1 G/DL (ref 0.4–1)
ANION GAP SERPL CALCULATED.3IONS-SCNC: 12 MMOL/L (ref 5–15)
B-GLOBULIN SERPL ELPH-MCNC: 0.9 G/DL (ref 0.7–1.3)
BUN BLD-MCNC: 44 MG/DL (ref 8–23)
BUN/CREAT SERPL: 27 (ref 7–25)
CALCIUM SPEC-SCNC: 8.2 MG/DL (ref 8.6–10.5)
CHLORIDE SERPL-SCNC: 104 MMOL/L (ref 98–107)
CO2 SERPL-SCNC: 23 MMOL/L (ref 22–29)
CREAT BLD-MCNC: 1.63 MG/DL (ref 0.57–1)
DEPRECATED RDW RBC AUTO: 44.7 FL (ref 37–54)
ERYTHROCYTE [DISTWIDTH] IN BLOOD BY AUTOMATED COUNT: 14.7 % (ref 12.3–15.4)
GAMMA GLOB SERPL ELPH-MCNC: 0.8 G/DL (ref 0.4–1.8)
GFR SERPL CREATININE-BSD FRML MDRD: 36 ML/MIN/1.73
GLOBULIN SER CALC-MCNC: 3.1 G/DL (ref 2.2–3.9)
GLUCOSE BLD-MCNC: 96 MG/DL (ref 65–99)
GLUCOSE BLDC GLUCOMTR-MCNC: 105 MG/DL (ref 70–130)
GLUCOSE BLDC GLUCOMTR-MCNC: 155 MG/DL (ref 70–130)
GLUCOSE BLDC GLUCOMTR-MCNC: 249 MG/DL (ref 70–130)
GLUCOSE BLDC GLUCOMTR-MCNC: 303 MG/DL (ref 70–130)
HCT VFR BLD AUTO: 32.9 % (ref 34–46.6)
HGB BLD-MCNC: 10.6 G/DL (ref 12–15.9)
IGA SERPL-MCNC: 222 MG/DL (ref 64–422)
IGG SERPL-MCNC: 983 MG/DL (ref 700–1600)
IGM SERPL-MCNC: 9 MG/DL (ref 26–217)
INTERPRETATION SERPL IEP-IMP: ABNORMAL
Lab: ABNORMAL
M-SPIKE: ABNORMAL G/DL
MCH RBC QN AUTO: 27.6 PG (ref 26.6–33)
MCHC RBC AUTO-ENTMCNC: 32.2 G/DL (ref 31.5–35.7)
MCV RBC AUTO: 85.7 FL (ref 79–97)
PLATELET # BLD AUTO: 251 10*3/MM3 (ref 140–450)
PMV BLD AUTO: 12 FL (ref 6–12)
POTASSIUM BLD-SCNC: 3.5 MMOL/L (ref 3.5–5.2)
PROT SERPL-MCNC: 5.8 G/DL (ref 6–8.5)
RBC # BLD AUTO: 3.84 10*6/MM3 (ref 3.77–5.28)
SODIUM BLD-SCNC: 139 MMOL/L (ref 136–145)
WBC NRBC COR # BLD: 10.75 10*3/MM3 (ref 3.4–10.8)

## 2020-02-13 PROCEDURE — 97535 SELF CARE MNGMENT TRAINING: CPT

## 2020-02-13 PROCEDURE — 85027 COMPLETE CBC AUTOMATED: CPT | Performed by: INTERNAL MEDICINE

## 2020-02-13 PROCEDURE — 25010000002 HEPARIN (PORCINE) PER 1000 UNITS: Performed by: INTERNAL MEDICINE

## 2020-02-13 PROCEDURE — 94799 UNLISTED PULMONARY SVC/PX: CPT

## 2020-02-13 PROCEDURE — 63710000001 INSULIN LISPRO (HUMAN) PER 5 UNITS: Performed by: INTERNAL MEDICINE

## 2020-02-13 PROCEDURE — 99232 SBSQ HOSP IP/OBS MODERATE 35: CPT | Performed by: INTERNAL MEDICINE

## 2020-02-13 PROCEDURE — 97530 THERAPEUTIC ACTIVITIES: CPT

## 2020-02-13 PROCEDURE — 80048 BASIC METABOLIC PNL TOTAL CA: CPT | Performed by: INTERNAL MEDICINE

## 2020-02-13 PROCEDURE — 82962 GLUCOSE BLOOD TEST: CPT

## 2020-02-13 RX ADMIN — INSULIN LISPRO 3 UNITS: 100 INJECTION, SOLUTION INTRAVENOUS; SUBCUTANEOUS at 21:41

## 2020-02-13 RX ADMIN — CARVEDILOL 12.5 MG: 12.5 TABLET, FILM COATED ORAL at 16:36

## 2020-02-13 RX ADMIN — IPRATROPIUM BROMIDE AND ALBUTEROL SULFATE 3 ML: 2.5; .5 SOLUTION RESPIRATORY (INHALATION) at 14:33

## 2020-02-13 RX ADMIN — INSULIN LISPRO 4 UNITS: 100 INJECTION, SOLUTION INTRAVENOUS; SUBCUTANEOUS at 16:36

## 2020-02-13 RX ADMIN — HYDRALAZINE HYDROCHLORIDE 25 MG: 25 TABLET, FILM COATED ORAL at 21:40

## 2020-02-13 RX ADMIN — INSULIN LISPRO 10 UNITS: 100 INJECTION, SOLUTION INTRAVENOUS; SUBCUTANEOUS at 12:57

## 2020-02-13 RX ADMIN — AMLODIPINE BESYLATE 10 MG: 10 TABLET ORAL at 09:25

## 2020-02-13 RX ADMIN — NYSTATIN: 100000 POWDER TOPICAL at 09:29

## 2020-02-13 RX ADMIN — INSULIN LISPRO 4 UNITS: 100 INJECTION, SOLUTION INTRAVENOUS; SUBCUTANEOUS at 12:56

## 2020-02-13 RX ADMIN — HYDRALAZINE HYDROCHLORIDE 25 MG: 25 TABLET, FILM COATED ORAL at 05:34

## 2020-02-13 RX ADMIN — NYSTATIN: 100000 POWDER TOPICAL at 21:41

## 2020-02-13 RX ADMIN — POVIDONE-IODINE: 10 SOLUTION TOPICAL at 09:27

## 2020-02-13 RX ADMIN — ASPIRIN 81 MG CHEWABLE TABLET 81 MG: 81 TABLET CHEWABLE at 09:23

## 2020-02-13 RX ADMIN — TIMOLOL MALEATE 1 DROP: 5 SOLUTION/ DROPS OPHTHALMIC at 09:29

## 2020-02-13 RX ADMIN — HEPARIN SODIUM 5000 UNITS: 5000 INJECTION, SOLUTION INTRAVENOUS; SUBCUTANEOUS at 09:23

## 2020-02-13 RX ADMIN — TIMOLOL MALEATE 1 DROP: 5 SOLUTION/ DROPS OPHTHALMIC at 21:40

## 2020-02-13 RX ADMIN — HEPARIN SODIUM 5000 UNITS: 5000 INJECTION, SOLUTION INTRAVENOUS; SUBCUTANEOUS at 21:40

## 2020-02-13 RX ADMIN — ISOSORBIDE MONONITRATE 60 MG: 60 TABLET, EXTENDED RELEASE ORAL at 09:24

## 2020-02-13 RX ADMIN — IPRATROPIUM BROMIDE AND ALBUTEROL SULFATE 3 ML: 2.5; .5 SOLUTION RESPIRATORY (INHALATION) at 20:37

## 2020-02-13 RX ADMIN — LATANOPROST 1 DROP: 50 SOLUTION OPHTHALMIC at 21:40

## 2020-02-13 RX ADMIN — IPRATROPIUM BROMIDE AND ALBUTEROL SULFATE 3 ML: 2.5; .5 SOLUTION RESPIRATORY (INHALATION) at 08:59

## 2020-02-13 RX ADMIN — INSULIN LISPRO 5 UNITS: 100 INJECTION, SOLUTION INTRAVENOUS; SUBCUTANEOUS at 16:37

## 2020-02-13 RX ADMIN — CARVEDILOL 12.5 MG: 12.5 TABLET, FILM COATED ORAL at 09:25

## 2020-02-13 RX ADMIN — INSULIN LISPRO 4 UNITS: 100 INJECTION, SOLUTION INTRAVENOUS; SUBCUTANEOUS at 09:23

## 2020-02-13 RX ADMIN — HYDRALAZINE HYDROCHLORIDE 25 MG: 25 TABLET, FILM COATED ORAL at 13:32

## 2020-02-13 NOTE — PROGRESS NOTES
"   LOS: 5 days    Patient Care Team:  Santiago Kuhn MD as PCP - General (Internal Medicine)        Subjective     Interval History:     No new complaints. Feeling better.     Review of Systems:   No CP or SOA. No edema    Objective     Vital Sign Min/Max for last 24 hours  Temp  Min: 98.3 °F (36.8 °C)  Max: 98.7 °F (37.1 °C)   BP  Min: 144/63  Max: 171/63   Pulse  Min: 58  Max: 74   Resp  Min: 18  Max: 20   SpO2  Min: 91 %  Max: 96 %   No data recorded   No data recorded     Flowsheet Rows      First Filed Value   Admission Height  157.5 cm (62\") Documented at 02/07/2020 2053   Admission Weight  63.5 kg (140 lb) Documented at 02/07/2020 2053          No intake/output data recorded.  I/O last 3 completed shifts:  In: 720 [P.O.:720]  Out: 1350 [Urine:1350]    Physical Exam:     General Appearance:    Alert, cooperative, in no acute distress   Head:    Normocephalic, without obvious abnormality, atraumatic               Neck:   No adenopathy, supple, trachea midline, no thyromegaly, no     carotid bruit, no JVD       Lungs:     Clear to auscultation,respirations regular, even and                   unlabored    Heart:    Regular rhythm and normal rate, normal S1 and S2, no            murmur, no gallop, no rub, no click       Abdomen:     Normal bowel sounds, no masses, no organomegaly, soft        non-tender, non-distended, no guarding, no rebound                 tenderness       Extremities:   Moves all extremities well, no edema, no cyanosis, no              redness               Neurologic:   Cranial nerves 2 - 12 grossly intact,no focal deficit noted       WBC WBC   Date Value Ref Range Status   02/13/2020 10.75 3.40 - 10.80 10*3/mm3 Final   02/12/2020 10.93 (H) 3.40 - 10.80 10*3/mm3 Final   02/11/2020 13.02 (H) 3.40 - 10.80 10*3/mm3 Final      HGB Hemoglobin   Date Value Ref Range Status   02/13/2020 10.6 (L) 12.0 - 15.9 g/dL Final   02/12/2020 10.8 (L) 12.0 - 15.9 g/dL Final   02/11/2020 11.0 (L) 12.0 - 15.9 " g/dL Final      HCT Hematocrit   Date Value Ref Range Status   02/13/2020 32.9 (L) 34.0 - 46.6 % Final   02/12/2020 32.4 (L) 34.0 - 46.6 % Final   02/11/2020 32.1 (L) 34.0 - 46.6 % Final      Platlets No results found for: LABPLAT   MCV MCV   Date Value Ref Range Status   02/13/2020 85.7 79.0 - 97.0 fL Final   02/12/2020 85.9 79.0 - 97.0 fL Final   02/11/2020 83.8 79.0 - 97.0 fL Final          Sodium Sodium   Date Value Ref Range Status   02/13/2020 139 136 - 145 mmol/L Final   02/12/2020 138 136 - 145 mmol/L Final   02/11/2020 139 136 - 145 mmol/L Final      Potassium Potassium   Date Value Ref Range Status   02/13/2020 3.5 3.5 - 5.2 mmol/L Final   02/12/2020 3.6 3.5 - 5.2 mmol/L Final   02/11/2020 3.1 (L) 3.5 - 5.2 mmol/L Final      Chloride Chloride   Date Value Ref Range Status   02/13/2020 104 98 - 107 mmol/L Final   02/12/2020 103 98 - 107 mmol/L Final   02/11/2020 101 98 - 107 mmol/L Final      CO2 CO2   Date Value Ref Range Status   02/13/2020 23.0 22.0 - 29.0 mmol/L Final   02/12/2020 22.0 22.0 - 29.0 mmol/L Final   02/11/2020 23.0 22.0 - 29.0 mmol/L Final      BUN BUN   Date Value Ref Range Status   02/13/2020 44 (H) 8 - 23 mg/dL Final   02/12/2020 63 (H) 8 - 23 mg/dL Final   02/11/2020 78 (H) 8 - 23 mg/dL Final      Creatinine Creatinine   Date Value Ref Range Status   02/13/2020 1.63 (H) 0.57 - 1.00 mg/dL Final   02/12/2020 2.63 (H) 0.57 - 1.00 mg/dL Final   02/11/2020 3.60 (H) 0.57 - 1.00 mg/dL Final      Calcium Calcium   Date Value Ref Range Status   02/13/2020 8.2 (L) 8.6 - 10.5 mg/dL Final   02/12/2020 8.4 (L) 8.6 - 10.5 mg/dL Final   02/11/2020 8.3 (L) 8.6 - 10.5 mg/dL Final      PO4 No results found for: CAPO4   Albumin No results found for: ALBUMIN   Magnesium No results found for: MG   Uric Acid No results found for: URICACID        Results Review:     I reviewed the patient's new clinical results.      amLODIPine 10 mg Oral Daily   aspirin 81 mg Oral Daily   carvedilol 12.5 mg Oral BID With  Meals   heparin (porcine) 5,000 Units Subcutaneous Q12H   hydrALAZINE 25 mg Oral Q8H   insulin lispro 0-14 Units Subcutaneous 4x Daily With Meals & Nightly   insulin lispro 4 Units Subcutaneous TID With Meals   ipratropium-albuterol 3 mL Nebulization 4x Daily - RT   isosorbide mononitrate 60 mg Oral Q24H   latanoprost 1 drop Right Eye Nightly   nystatin  Topical Q12H   povidone-iodine  Topical Daily   timolol 1 drop Both Eyes Q12H        RENAL ULTRASOUND:    FINDINGS: The right kidney measures in length from pole to pole 9.4 cm.  No solid cortical mass or renal cortical cyst seen within the right  kidney. No hydronephrosis or nephrolithiasis.     The left kidney is smaller in size measuring approximate 7.3 cm. There  is no solid cortical mass or renal cortical cyst seen within the left  kidney. No hydronephrosis or nephrolithiasis. Bladder is incompletely  distended and grossly unremarkable in appearance.     IMPRESSION:  Asymmetry seen in the size of the kidneys with the right  kidney being larger than the left. No gross abnormality seen within  either kidney.    Renal duplex:    FINDINGS: Right kidney measures 10.2 cm in length without gross  hydronephrosis. Peak systolic velocity at the level of the abdominal  aorta measures 61.6 cm/s. Limited visualization, however, parenchymal  resistive indices measure up to 0.75 which are elevated.     Left kidney not clearly visualized and suboptimal due to bowel gas and  overall body habitus.     IMPRESSION:  Limited evaluation due to several factors as detailed above  with nonvisualization of the left kidney. However within limits of the  study, there is noted increased resistive indices within the right  kidney from expected measuring up to 0.75 concern for renal artery  stenosis at least on the right.    Medication Review:     Assessment/Plan       Diabetic acidosis without coma (CMS/HCC)    Diabetes mellitus (CMS/HCC)    Hypertensive emergency    Diarrhea of presumed  infectious origin    Essential hypertension    Acute renal failure (ARF) (CMS/HCC)    Type 2 diabetes mellitus with diabetic nephropathy, with long-term current use of insulin (CMS/HCC)    Hyponatremia    Glaucoma    Debility    Metabolic acidosis, increased anion gap    ATN (acute tubular necrosis) (CMS/HCC)    Renal artery stenosis, right    1- SALOMÓN - non oliguric - improving. UOP 1.5 lit/24 hrs. Baseline Scr 1.2. Likely Hypertensive crisis induced vs GN.   2- Metabolic acidosis -DKA  3- Hypertension crisis -  4- Hyperkalemia -resolved.   5- Mild hyponatremia  6- Hyperglycemia - improved  7- Proteinuria - P/C ratio 0.4 DANA(-), ANCA (-), SFLC 2.57- elevated because of  SALOMÓN    Plan:  - Continue with current. Renal function improving.   - Increase hydralazine to 50 mg po q bid.   - Monitor I/O strictly  - Renal diet.   - Renal duplex - left side not visualized and right side with elevated RI - 0.75 and PSV is less than 180..I will avoid contrast study for further workup with SALOMÓN. CO 2 angio not available. Renal function improving. Will monitor.   -  Urine electrophoresis and SIEP pending. SFLC ratio is elevated but inconclusive because of renal insufficiency.     Follow up with [facility] in 2 weeks with renal function and UA.       Alcon Kellogg MD  02/13/20  10:29 AM

## 2020-02-13 NOTE — PLAN OF CARE
Pt planned to DC tomorrow to the [facility]. PO intake encouraged. BP improving. CR improving. VSS. Will cont to monitor.

## 2020-02-13 NOTE — PROGRESS NOTES
[facility] Medicine Services  PROGRESS NOTE    Patient Name: Varinder Delgado  : 1931  MRN: [id number]    Date of Admission: 2020  Primary Care Physician: Santiago Kuhn MD    Subjective   Subjective     CC:  Follow-up renal failure and DKA    HPI:  Denies cough.  Eating well.  No nausea  Review of Systems  Gen- No fevers, chills  CV- No chest pain, palpitations  Resp- No cough, dyspnea  GI- No N/V/D, abd pain      Objective   Objective     Vital Signs:   Temp:  [98.3 °F (36.8 °C)-98.7 °F (37.1 °C)] 98.7 °F (37.1 °C)  Heart Rate:  [58-74] 66  Resp:  [18-20] 18  BP: (152-171)/(63-70) 152/63        Physical Exam:  Constitutional -no acute distress, non toxic, sitting up in recliner  HEENT-NCAT, mucous membranes moist  CV-RRR, S1 S2 normal, no m/r/g  Resp-CTAB, no wheezes, rhonchi or rales  Abd-soft, non-tender, non-distended, normo active bowel sounds, overweight  Ext-No lower extremity cyanosis, clubbing or edema bilaterally  Neuro-alert and oriented, speech clear, moves all extremities   Psych-normal affect   Skin- No rash on exposed UE or LE bilaterally        Results Reviewed:  Results from last 7 days   Lab Units 20   WBC 10*3/mm3 10.75 10.93* 13.02*   HEMOGLOBIN g/dL 10.6* 10.8* 11.0*   HEMATOCRIT % 32.9* 32.4* 32.1*   PLATELETS 10*3/mm3 251 209 207     Results from last 7 days   Lab Units 20  0407  20  2300 20  2120   SODIUM mmol/L 139 138 139   < > 125* 126*   POTASSIUM mmol/L 3.5 3.6 3.1*   < > 5.8* 6.9*   CHLORIDE mmol/L 104 103 101   < > 91* 91*   CO2 mmol/L 23.0 22.0 23.0   < > 13.0* 15.0*   BUN mg/dL 44* 63* 78*   < > 83* 86*   CREATININE mg/dL 1.63* 2.63* 3.60*   < > 6.25* 6.44*   GLUCOSE mg/dL 96 166* 65   < > 522* 603*   CALCIUM mg/dL 8.2* 8.4* 8.3*   < > 7.8* 8.4*   ALT (SGPT) U/L  --   --   --   --  9 14   AST (SGOT) U/L  --   --   --   --  16 40*   TROPONIN T ng/mL  --    --   --   --  0.029  --     < > = values in this interval not displayed.     Estimated Creatinine Clearance: 21.9 mL/min (A) (by C-G formula based on SCr of 1.63 mg/dL (H)).    Microbiology Results Abnormal     Procedure Component Value - Date/Time    Urine Culture - Urine, Urine, Clean Catch [[id number]]  (Normal) Collected:  02/08/20 0556    Lab Status:  Final result Specimen:  Urine, Clean Catch Updated:  02/09/20 1039     Urine Culture No growth    Eosinophil Smear - Urine, Urine, Clean Catch [[id number]]  (Normal) Collected:  02/08/20 0556    Lab Status:  Final result Specimen:  Urine, Clean Catch Updated:  02/08/20 0919     Eosinophil Smear 0 % EOS/100 Cells     Narrative:       No eosinophil seen          Imaging Results (Last 24 Hours)     ** No results found for the last 24 hours. **          Results for orders placed during the hospital encounter of 10/22/18   Adult Transthoracic Echo Limited W/ Cont if Necessary Per Protocol    Narrative · This was a limited echocardiogram to assess left ventricular ejection   fraction.  · Left ventricular systolic function is normal. Estimated EF = 60%.  · Left ventricular wall thickness is consistent with moderate concentric   hypertrophy.          I have reviewed the medications:  Scheduled Meds:    amLODIPine 10 mg Oral Daily   aspirin 81 mg Oral Daily   carvedilol 12.5 mg Oral BID With Meals   heparin (porcine) 5,000 Units Subcutaneous Q12H   hydrALAZINE 25 mg Oral Q8H   insulin lispro 0-14 Units Subcutaneous 4x Daily With Meals & Nightly   insulin lispro 4 Units Subcutaneous TID With Meals   ipratropium-albuterol 3 mL Nebulization 4x Daily - RT   isosorbide mononitrate 60 mg Oral Q24H   latanoprost 1 drop Right Eye Nightly   nystatin  Topical Q12H   povidone-iodine  Topical Daily   timolol 1 drop Both Eyes Q12H     Continuous Infusions:     PRN Meds:.•  acetaminophen  •  dextrose  •  dextrose  •  famotidine  •  glucagon (human recombinant)  •  ipratropium-albuterol  •   labetalol  •  melatonin  •  ondansetron  •  sodium chloride    Assessment/Plan   Assessment & Plan     Active Hospital Problems    Diagnosis  POA   • **Diabetic acidosis without coma (CMS/[facility]) [E11.10]  Yes   • Renal artery stenosis, right [I70.1]  Yes   • ATN (acute tubular necrosis) (CMS/[facility]) [N17.0]  Yes   • Diarrhea of presumed infectious origin [R19.7]  Yes   • Essential hypertension [I10]  Yes   • Acute renal failure (ARF) (CMS/[facility]) [N17.9]  Yes   • Type 2 diabetes mellitus with diabetic nephropathy, with long-term current use of insulin (CMS/[facility]) [E11.21, Z79.4]  Not Applicable   • Hyponatremia [E87.1]  Yes   • Glaucoma [H40.9]  Yes   • Debility [R53.81]  Yes   • Metabolic acidosis, increased anion gap [E87.2]  Yes   • Hypertensive emergency [I16.1]  Yes   • Diabetes mellitus (CMS/[facility]) [E11.9]  Yes      Resolved Hospital Problems   No resolved problems to display.        Brief Hospital Course to date:  Varinder Delgado is a 88 y.o. female with past medical history of insulin-dependent diabetes mellitus who presents to the hospital with diabetic ketoacidosis and acute renal failure in the setting of recent diarrheal illness that is now resolved.  She was found to have probable ATN due to dehydration from diabetic ketoacidosis.  Nephrology consult team has followed and assisted with management.  She has started to have gradual recovery of her renal function.  She also had hypertensive emergency and required Cardene drip earlier in hospital stay.  Renal ultrasound is concerning for renal artery stenosis on the right.        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  Glucose still somewhat labile ranging from  over the past 24 hours  -Continuing lispro with meals and sliding scale insulin    Acute renal failure due to ATN:  Likely prolonged prerenal failure from diarrhea caused ATN.  Nephrology following.    -Renal function continues to  improve, creatinine 1.6 today  Patient has had some intermittent volume overload that has required Lasix.  -In for follow-up in [facility] in 2 weeks  -Urine electrophoresis and SIEP remain pending and can be followed up in nephrology clinic as well  -Elevated resistance index on the right kidney, nephrology wishes to avoid, however, a contrasted study for further work-up of her acute kidney injury and CO2 angiogram is not available here.  Will defer further treatment to [facility] as well.    Right renal artery stenosis:  Noted on ultrasound.  Nephrology recommends to avoid hypotension and hold off further work-up or contrast until renal failure further improved.  Follow-up [facility] in 2 weeks    Hyponatremia:  Improved with IV fluids and renal recovery.  -Encouraged p.o. fluid intake with patient today    Debility: PT OT.  Recommend rehab at discharge.    Essential hypertension:  Avoid hypotension as per above due to renal failure and renal artery stenosis.  Gradual titration of medications.    DVT Prophylaxis:   NALINI    Disposition: I expect the patient to be discharged to rehab on Friday    CODE STATUS:   Code Status and Medical Interventions:   Ordered at: 02/08/20 0141     Code Status:    CPR     Medical Interventions (Level of Support Prior to Arrest):    Full         Electronically signed by Flo Tanner MD, 02/13/20, 4:08 PM.

## 2020-02-13 NOTE — PROGRESS NOTES
"Adult Nutrition  Assessment/PES    Patient Name:  Varinder Delgado  YOB: 1931  MRN: [id number]  Admit Date:  2/7/2020    Assessment Date:  2/13/2020    Comments:      Reason for Assessment     Row Name 02/13/20 1218          Reason for Assessment    Reason For Assessment  follow-up protocol 15\"                   Nutrition Prescription Ordered     Row Name 02/13/20 1218          Nutrition Prescription PO    Current PO Diet  Regular     Supplement  Novasource Renal (Nepro)     Supplement Frequency  Daily     Common Modifiers  Consistent Carbohydrate;Renal                 Problem/Interventions:  Problem 1     Row Name 02/13/20 1219          Nutrition Diagnoses Problem 1    Problem 1  Inadequate Nutrient Intake     Signs/Symptoms (evidenced by)  PO Intake     Percent (%) intake recorded  67 %     Over number of meals  3     Resolved?  Yes                     Education/Evaluation     Row Name 02/13/20 1219          Monitor/Evaluation    Monitor  Per protocol           Electronically signed by:  Luna Espinosa RD  02/13/20 12:20 PM  "

## 2020-02-13 NOTE — THERAPY TREATMENT NOTE
Acute Care - Occupational Therapy Treatment Note  [facility]     Patient Name: Varinder Delgado  : 1931  MRN: [id number]  Today's Date: 2020     Date of Referral to OT: 20       Admit Date: 2020       ICD-10-CM ICD-9-CM   1. Diabetic ketoacidosis without coma associated with other specified diabetes mellitus (CMS/HCC) E13.10 250.12   2. Hypertensive urgency I16.0 401.9   3. Renal failure, unspecified chronicity N19 586   4. Impaired mobility and ADLs Z74.09 799.89     Patient Active Problem List   Diagnosis   • Essential hypertension   • Diabetes mellitus (CMS/[facility])   • Mixed hyperlipidemia   • Tobacco abuse   • Hypertensive emergency   • Coronary artery disease involving native coronary artery of native heart without angina pectoris   • Claudication (CMS/[facility])   • Lower extremity edema   • Diabetic acidosis without coma (CMS/[facility])   • Diarrhea of presumed infectious origin   • Essential hypertension   • Acute renal failure (ARF) (CMS/[facility])   • Type 2 diabetes mellitus with diabetic nephropathy, with long-term current use of insulin (CMS/[facility])   • Hyponatremia   • Glaucoma   • Debility   • Metabolic acidosis, increased anion gap   • ATN (acute tubular necrosis) (CMS/[facility])   • Renal artery stenosis, right     Past Medical History:   Diagnosis Date   • Diabetes mellitus (CMS/[facility])    • Hyperlipidemia    • Hypertension    • MI (myocardial infarction) (CMS/[facility])      Past Surgical History:   Procedure Laterality Date   • CARDIAC CATHETERIZATION N/A 2018    Procedure: Left Heart Cath;  Surgeon: Sreekanth De La Rosa MD;  Location: [facility] INVASIVE LOCATION;  Service: Cardiology   • MOUTH SURGERY         Therapy Treatment    Rehabilitation Treatment Summary     Row Name 20 1430             Treatment Time/Intention    Discipline  occupational therapist  -KA      Document Type  therapy note (daily note)  -MARYELLEN      Subjective Information  complains of;weakness;fatigue  -MARYELLEN      Mode of Treatment   occupational therapy  -KA      Therapy Frequency (OT Eval)  daily  -KA      Patient Effort  excellent  -KA      Existing Precautions/Restrictions  fall  -KA      Recorded by [KA] Jessica Peace OT 02/13/20 1552      Row Name 02/13/20 1430             Vital Signs    Pre Systolic BP Rehab  -- VSS; pt cleared for therapy w/nsg.   -KA      Pre Patient Position  Sitting  -KA      Intra Patient Position  Standing  -KA      Post Patient Position  Sitting  -KA      Recorded by [KA] Jessica Peace OT 02/13/20 1552      Row Name 02/13/20 1430             Cognitive Assessment/Intervention- PT/OT    Affect/Mental Status (Cognitive)  WFL  -KA      Orientation Status (Cognition)  oriented x 4  -KA      Follows Commands (Cognition)  WFL  -KA      Safety Deficit (Cognitive)  mild deficit;insight into deficits/self awareness;safety precautions awareness;safety precautions follow-through/compliance  -KA      Recorded by [KA] Jessica Peace OT 02/13/20 1552      Row Name 02/13/20 1430             Transfer Assessment/Treatment    Transfer Assessment/Treatment  sit-stand transfer;stand-sit transfer  -KA      Comment (Transfers)  x3; cues for proper hand placement.   -KA      Recorded by [KA] Jessica Peace OT 02/13/20 1552      Row Name 02/13/20 1430             Sit-Stand Transfer    Sit-Stand [place] (Transfers)  minimum assist (75% patient effort);verbal cues  -KA      Recorded by [KA] Jessica Peace OT 02/13/20 1552      Row Name 02/13/20 1430             Stand-Sit Transfer    Stand-Sit [place] (Transfers)  minimum assist (75% patient effort);verbal cues  -KA      Recorded by [KA] Jessica Peace OT 02/13/20 1552      Row Name 02/13/20 1430             ADL Assessment/Intervention    14133 - OT Self Care/Mgmt Minutes  8  -KA      BADL Assessment/Intervention  lower body dressing  -KA      Recorded by [KA] Jessica Peace OT 02/13/20 1552      Row Name 02/13/20 1430             Lower Body Dressing  Assessment/Training    Lower Body Dressing [place] Level  doff;don;socks;supervision  -KA      Lower Body Dressing Position  unsupported sitting  -KA      Comment (Lower Body Dressing)  1 min rest break d/t SOB.  -KA      Recorded by [KA] Jessica Peace OT 02/13/20 1552      Row Name 02/13/20 1430             Dynamic Sitting Balance    Level of [place], Reaches Outside Midline (Sitting, Dynamic Balance)  standby assist  -KA      Sitting Position, Reaches Outside Midline (Sitting, Dynamic Balance)  sitting in chair  -KA      Comment, Reaches Outside Midline (Sitting, Dynamic Balance)  LBD task  -KA      Recorded by [KA] Jessica Peace OT 02/13/20 1552      Row Name 02/13/20 1430             Static Standing Balance    Level of [place] (Supported Standing, Static Balance)  minimal assist, 75% patient effort  -KA      Time Able to Maintain Position (Supported Standing, Static Balance)  45 to 60 seconds  -KA      Recorded by [KA] Jessica Peace OT 02/13/20 1552      Row Name 02/13/20 1430             Positioning and Restraints    Pre-Treatment Position  sitting in chair/recliner  -KA      Post Treatment Position  chair  -KA      In Chair  reclined;call light within reach;encouraged to call for assist;exit alarm on;legs elevated;heels elevated  -KA      Recorded by [KA] Jessica Peace OT 02/13/20 1552      Row Name 02/13/20 1430             Pain Scale: Numbers Pre/Post-Treatment    Pain Scale: Numbers, Pretreatment  0/10 - no pain  -KA      Pain Scale: Numbers, Post-Treatment  0/10 - no pain  -KA      Recorded by [KA] Jessica Peace OT 02/13/20 1552      Row Name                Wound 02/08/20 1245 Right medial heel Fissure    Wound - Properties Group Date first assessed: 02/08/20 [CP] Time first assessed: 1245 [CP] Present on Hospital Admission: Y [CP] Side: Right [CP] Orientation: medial [CP] Location: heel [CP] Primary Wound Type: Fissure [CP] Recorded by:  [CP] Valeri Pierce  APRN 02/08/20 1431    Row Name 02/13/20 1430             Coping    Observed Emotional State  accepting;calm;cooperative  -KA      Verbalized Emotional State  acceptance  -KA      Recorded by [KA] Jessica Peace, OT 02/13/20 1552      Row Name 02/13/20 1430             Plan of Care Review    Plan of Care Reviewed With  patient  -KA      Progress  improving  -KA      Recorded by [KA] Jessica Peace, OT 02/13/20 1552      Row Name 02/13/20 1430             Outcome Summary/Treatment Plan (OT)    Daily Summary of Progress (OT)  progress toward functional goals is good  -KA      Anticipated Discharge Disposition (OT)  inpatient rehabilitation facility  -KA      Recorded by [KA] Jessica Peace, SHAYY 02/13/20 1552        User Key  (r) = Recorded By, (t) = Taken By, (c) = Cosigned By    Initials Name Effective Dates Discipline    CP Valeri Pierce, APRN 07/24/19 - 02/10/20 Nurse    Jessica Cole, SHAYY 07/17/19 -  OT        Rash 02/08/20 1245 Left lower breast macular;papule (Active)   Distribution localized 2/13/2020 12:00 PM   Configuration/Shape asymmetric 2/13/2020 12:00 PM   Characteristics dry 2/13/2020 12:00 PM   Color color consistent with skin 2/13/2020 12:00 PM   Care, Rash antimicrobial agent applied 2/13/2020  8:00 AM       Wound 02/08/20 1245 Right medial heel Fissure (Active)   Dressing Appearance open to air 2/13/2020 12:00 PM   Closure Open to air 2/13/2020 12:00 PM   Base non-blanchable;black eschar;dry 2/13/2020 12:00 PM   Periwound intact;dry 2/13/2020 12:00 PM   Periwound Temperature cool 2/13/2020 12:00 PM   Periwound Skin Turgor firm 2/13/2020 12:00 PM   Edges irregular;open 2/13/2020 12:00 PM   Drainage Amount none 2/13/2020 12:00 PM   Care, Wound other (see comments) 2/13/2020  8:00 AM   Dressing Care, Wound open to air 2/13/2020 12:00 PM           OT Recommendation and Plan  Outcome Summary/Treatment Plan (OT)  Daily Summary of Progress (OT): progress toward functional goals is  good  Anticipated Discharge Disposition (OT): inpatient rehabilitation facility  Therapy Frequency (OT Eval): daily  Daily Summary of Progress (OT): progress toward functional goals is good  Plan of Care Review  Plan of Care Reviewed With: patient  Plan of Care Reviewed With: patient  Outcome Summary: LBD (socks): SBA w/1 min rest break d/t SOB.  Sit<>Stand x3: Lev.  Limited by strength, endurance, balance.  Cont to recommend rehab.  Will cont to observe and address ADL/IADL deficits as needed.  Outcome Measures     Row Name 02/13/20 1501             How much help from another is currently needed...    Putting on and taking off regular lower body clothing?  2  -KA      Bathing (including washing, rinsing, and drying)  2  -KA      Toileting (which includes using toilet bed pan or urinal)  2  -KA      Putting on and taking off regular upper body clothing  4  -KA      Taking care of personal grooming (such as brushing teeth)  3  -KA      Eating meals  4  -KA      AM-PAC 6 Clicks Score (OT)  17  -KA        User Key  (r) = Recorded By, (t) = Taken By, (c) = Cosigned By    Initials Name Provider Type    Jessica Cole OT Occupational Therapist           Time Calculation:   Time Calculation- OT     Row Name 02/13/20 1501 02/13/20 1430          Time Calculation- OT    OT Start Time  1501  -KA  --     OT Received On  02/13/20  -KA  --     OT Goal Re-Cert Due Date  02/20/20  -KA  --        Timed Charges    69164 - OT Therapeutic Activity Minutes  10  -KA  --     69263 - OT Self Care/Mgmt Minutes  8  -KA  8  -KA       User Key  (r) = Recorded By, (t) = Taken By, (c) = Cosigned By    Initials Name Provider Type    Jessica Cole OT Occupational Therapist        Therapy Charges for Today     Code Description Service Date Service Provider Modifiers Qty    28694406330 HC OT THERAPEUTIC ACT EA 15 MIN 2/13/2020 Jessica Peace OT GO 1    78497515574 HC OT SELF CARE/MGMT/TRAIN EA 15 MIN 2/13/2020 Jessica Peace  OT GO 1               Jessica Peace, OT  2/13/2020

## 2020-02-13 NOTE — PROGRESS NOTES
Case Management Discharge Note      Final Note: [place] [facility]  [place] precert has been approved and a skilled bed is available on Fri, 2/14.  RN to call report to [phone] and fax DC summary to [phone].  Son to transport.      Provided post acute provider list?: Yes  Post Acute Provider List: Nursing Home  Delivered To: Patient  Method of Delivery: In person    Destination - Selection Complete      Service Provider Request Status Selected Services Address Phone Number Fax Number    [facility] Selected Skilled Nursing [facility] [place] [address] [phone]      Durable Medical Equipment      No service has been selected for the patient.      Dialysis/Infusion      No service has been selected for the patient.      Home Medical Care      No service has been selected for the patient.      Therapy      No service has been selected for the patient.      Community Resources      No service has been selected for the patient.             Final Discharge Disposition Code: 03 - skilled nursing facility (SNF)

## 2020-02-13 NOTE — PLAN OF CARE
Problem: Patient Care Overview  Goal: Plan of Care Review  Outcome: Ongoing (interventions implemented as appropriate)  Flowsheets  Taken 2/13/2020 0433 by Elaine Blancas RN  Progress: improving  Outcome Summary: pt rested without complaints, VSS, working on rehab placement  Taken 2/12/2020 1800 by Amy Ochoa, Nursing Student  Plan of Care Reviewed With: patient

## 2020-02-13 NOTE — PLAN OF CARE
Problem: Patient Care Overview  Goal: Plan of Care Review  Flowsheets (Taken 2/13/2020 1430)  Outcome Summary: LBD (socks): SBA w/1 min rest break d/t SOB.  Sit<>Stand x3: Lev; verbal cues for proper hand placment.  Limited by strength, endurance, balance.  Cont to recommend rehab.  Will cont to observe and address ADL/IADL deficits as needed.

## 2020-02-13 NOTE — PROGRESS NOTES
[facility]  [facility]     Patient Name: Varinder Delgado  MRN: [id number]  Today's Date: 2/13/2020    Admit Date: 2/7/2020    Discharge Needs Assessment    No documentation.       Discharge Plan     Row Name 02/13/20 1223       Plan    Patient/Family in Agreement with Plan  yes    Plan Comments  Spoke with Samara at [facility].  They have offered a STR bed and started insurance precert today.  CM will cont to follow.    Final Discharge Disposition Code  03 - skilled nursing facility (SNF)        Destination - Selection Complete      Service Provider Request Status Selected Services Address Phone Number Fax Number    [facility] Selected Skilled Nursing [address] [place] [address] [phone]    [facility] Pending - Request Sent N/A [address] [place] [address] [phone]    [facility] Pending - No Request Sent N/A [address] [id number] [phone]    [facility] Pending - No Request Sent N/A [address] [place] [address] [phone]    [facility] Pending - No Request Sent N/A [address] [place] [id number] [phone]    [facility] Pending - No Request Sent N/A [address] [place] [id number] [phone]    [facility] SUBACUTE Declined  Unable to Meet Patient Needs N/A [address] [place] [id number] [phone]    [facility] Declined  No Bed Available N/A [address] [place] [id number] [phone]    [facility] Declined  No Bed Available N/A [address] [place] [id number] [phone]      Durable Medical Equipment      Coordination has not been started for this encounter.      Dialysis/Infusion      Coordination has not been  started for this encounter.      Home Medical Care      Coordination has not been started for this encounter.      Therapy      Coordination has not been started for this encounter.      Community Resources      Coordination has not been started for this encounter.        Expected Discharge Date and Time     Expected Discharge Date Expected Discharge Time    Feb 13, 2020         Demographic Summary    No documentation.       Functional Status    No documentation.       Psychosocial    No documentation.       Abuse/Neglect    No documentation.       Legal    No documentation.       Substance Abuse    No documentation.       Patient Forms    No documentation.           Tiffanie Contreras RN

## 2020-02-14 VITALS
SYSTOLIC BLOOD PRESSURE: 164 MMHG | DIASTOLIC BLOOD PRESSURE: 66 MMHG | HEART RATE: 64 BPM | OXYGEN SATURATION: 94 % | TEMPERATURE: 98.7 F | RESPIRATION RATE: 16 BRPM | HEIGHT: 62 IN | WEIGHT: 154.5 LBS | BODY MASS INDEX: 28.43 KG/M2

## 2020-02-14 PROBLEM — I16.1 HYPERTENSIVE EMERGENCY: Status: RESOLVED | Noted: 2018-07-25 | Resolved: 2020-02-14

## 2020-02-14 PROBLEM — E11.9 DIABETES MELLITUS (HCC): Status: RESOLVED | Noted: 2018-07-25 | Resolved: 2020-02-14

## 2020-02-14 PROBLEM — E87.29 METABOLIC ACIDOSIS, INCREASED ANION GAP: Status: RESOLVED | Noted: 2020-02-08 | Resolved: 2020-02-14

## 2020-02-14 PROBLEM — R19.7 DIARRHEA OF PRESUMED INFECTIOUS ORIGIN: Status: RESOLVED | Noted: 2020-02-08 | Resolved: 2020-02-14

## 2020-02-14 PROBLEM — E11.10 DIABETIC ACIDOSIS WITHOUT COMA (HCC): Status: RESOLVED | Noted: 2020-02-08 | Resolved: 2020-02-14

## 2020-02-14 PROBLEM — N17.9 ACUTE RENAL FAILURE (ARF) (HCC): Status: RESOLVED | Noted: 2020-02-08 | Resolved: 2020-02-14

## 2020-02-14 PROBLEM — I10 ESSENTIAL HYPERTENSION: Status: RESOLVED | Noted: 2020-02-08 | Resolved: 2020-02-14

## 2020-02-14 PROBLEM — E87.1 HYPONATREMIA: Status: RESOLVED | Noted: 2020-02-08 | Resolved: 2020-02-14

## 2020-02-14 LAB
ALBUMIN 24H MFR UR ELPH: 17.1 %
ALPHA1 GLOB 24H MFR UR ELPH: 2.4 %
ALPHA2 GLOB 24H MFR UR ELPH: 15 %
B-GLOBULIN MFR UR ELPH: 28 %
GAMMA GLOB 24H MFR UR ELPH: 37.4 %
GLUCOSE BLDC GLUCOMTR-MCNC: 141 MG/DL (ref 70–130)
Lab: NORMAL
M PROTEIN 24H UR ELPH-MRATE: NORMAL MG/24 HR
M PROTEIN MFR UR ELPH: NORMAL %
PROT 24H UR-MRATE: NORMAL MG/24 HR (ref 30–150)
PROT UR-MCNC: 16.7 MG/DL

## 2020-02-14 PROCEDURE — 25010000002 HEPARIN (PORCINE) PER 1000 UNITS: Performed by: INTERNAL MEDICINE

## 2020-02-14 PROCEDURE — 99239 HOSP IP/OBS DSCHRG MGMT >30: CPT | Performed by: NURSE PRACTITIONER

## 2020-02-14 PROCEDURE — 82962 GLUCOSE BLOOD TEST: CPT

## 2020-02-14 PROCEDURE — 94799 UNLISTED PULMONARY SVC/PX: CPT

## 2020-02-14 PROCEDURE — 97116 GAIT TRAINING THERAPY: CPT

## 2020-02-14 PROCEDURE — 97110 THERAPEUTIC EXERCISES: CPT

## 2020-02-14 RX ORDER — HYDRALAZINE HYDROCHLORIDE 25 MG/1
25 TABLET, FILM COATED ORAL EVERY 8 HOURS SCHEDULED
Start: 2020-02-14 | End: 2020-07-16 | Stop reason: SDUPTHER

## 2020-02-14 RX ORDER — CARVEDILOL 12.5 MG/1
12.5 TABLET ORAL 2 TIMES DAILY WITH MEALS
Start: 2020-02-14 | End: 2021-01-01 | Stop reason: SDUPTHER

## 2020-02-14 RX ORDER — IPRATROPIUM BROMIDE AND ALBUTEROL SULFATE 2.5; .5 MG/3ML; MG/3ML
3 SOLUTION RESPIRATORY (INHALATION)
Qty: 360 ML
Start: 2020-02-14

## 2020-02-14 RX ORDER — NYSTATIN 100000 [USP'U]/G
POWDER TOPICAL EVERY 12 HOURS SCHEDULED
Start: 2020-02-14 | End: 2020-11-30

## 2020-02-14 RX ORDER — ACETAMINOPHEN 650 MG
TABLET, EXTENDED RELEASE ORAL DAILY
Start: 2020-02-15 | End: 2020-11-30

## 2020-02-14 RX ADMIN — TIMOLOL MALEATE 1 DROP: 5 SOLUTION/ DROPS OPHTHALMIC at 08:21

## 2020-02-14 RX ADMIN — POVIDONE-IODINE: 10 SOLUTION TOPICAL at 08:22

## 2020-02-14 RX ADMIN — ISOSORBIDE MONONITRATE 60 MG: 60 TABLET, EXTENDED RELEASE ORAL at 08:21

## 2020-02-14 RX ADMIN — HEPARIN SODIUM 5000 UNITS: 5000 INJECTION, SOLUTION INTRAVENOUS; SUBCUTANEOUS at 08:20

## 2020-02-14 RX ADMIN — HYDRALAZINE HYDROCHLORIDE 25 MG: 25 TABLET, FILM COATED ORAL at 05:34

## 2020-02-14 RX ADMIN — CARVEDILOL 12.5 MG: 12.5 TABLET, FILM COATED ORAL at 08:21

## 2020-02-14 RX ADMIN — SODIUM CHLORIDE, PRESERVATIVE FREE 10 ML: 5 INJECTION INTRAVENOUS at 08:21

## 2020-02-14 RX ADMIN — AMLODIPINE BESYLATE 10 MG: 10 TABLET ORAL at 08:21

## 2020-02-14 RX ADMIN — NYSTATIN: 100000 POWDER TOPICAL at 08:22

## 2020-02-14 RX ADMIN — INSULIN LISPRO 4 UNITS: 100 INJECTION, SOLUTION INTRAVENOUS; SUBCUTANEOUS at 08:20

## 2020-02-14 RX ADMIN — ASPIRIN 81 MG CHEWABLE TABLET 81 MG: 81 TABLET CHEWABLE at 08:21

## 2020-02-14 RX ADMIN — IPRATROPIUM BROMIDE AND ALBUTEROL SULFATE 3 ML: 2.5; .5 SOLUTION RESPIRATORY (INHALATION) at 08:11

## 2020-02-14 NOTE — PLAN OF CARE
Problem: Patient Care Overview  Goal: Plan of Care Review  2/14/2020 1013 by Liana De, PT  Flowsheets (Taken 2/14/2020 1010)  Progress: improving  Plan of Care Reviewed With: patient;family  Outcome Summary: Pt demonstrated ability to progress forward ambulation distance to 40 ft, Lev, RW use. Cont to be limited by fatigue and balance deficits with mobility, but very motivated to participate. Will cont PT POC.

## 2020-02-14 NOTE — PROGRESS NOTES
"   LOS: 6 days    Patient Care Team:  Santiago Kuhn MD as PCP - General (Internal Medicine)        Subjective     Interval History:     No new complaints. Feeling better.     Review of Systems:   No CP or SOA. No edema    Objective     Vital Sign Min/Max for last 24 hours  Temp  Min: 98.7 °F (37.1 °C)  Max: 98.7 °F (37.1 °C)   BP  Min: 152/63  Max: 164/66   Pulse  Min: 61  Max: 72   Resp  Min: 16  Max: 18   SpO2  Min: 92 %  Max: 96 %   No data recorded   No data recorded     Flowsheet Rows      First Filed Value   Admission Height  157.5 cm (62\") Documented at 02/07/2020 2053   Admission Weight  63.5 kg (140 lb) Documented at 02/07/2020 2053          No intake/output data recorded.  I/O last 3 completed shifts:  In: 510 [P.O.:510]  Out: 1125 [Urine:1125]    Physical Exam:     General Appearance:    Alert, cooperative, in no acute distress   Head:    Normocephalic, without obvious abnormality, atraumatic               Neck:   No adenopathy, supple, trachea midline, no thyromegaly, no     carotid bruit, no JVD       Lungs:     Clear to auscultation,respirations regular, even and                   unlabored    Heart:    Regular rhythm and normal rate, normal S1 and S2, no            murmur, no gallop, no rub, no click       Abdomen:     Normal bowel sounds, no masses, no organomegaly, soft        non-tender, non-distended, no guarding, no rebound                 tenderness       Extremities:   Moves all extremities well, no edema, no cyanosis, no              redness               Neurologic:   Cranial nerves 2 - 12 grossly intact,no focal deficit noted       WBC WBC   Date Value Ref Range Status   02/13/2020 10.75 3.40 - 10.80 10*3/mm3 Final   02/12/2020 10.93 (H) 3.40 - 10.80 10*3/mm3 Final      HGB Hemoglobin   Date Value Ref Range Status   02/13/2020 10.6 (L) 12.0 - 15.9 g/dL Final   02/12/2020 10.8 (L) 12.0 - 15.9 g/dL Final      HCT Hematocrit   Date Value Ref Range Status   02/13/2020 32.9 (L) 34.0 - 46.6 % " Final   02/12/2020 32.4 (L) 34.0 - 46.6 % Final      Platlets No results found for: LABPLAT   MCV MCV   Date Value Ref Range Status   02/13/2020 85.7 79.0 - 97.0 fL Final   02/12/2020 85.9 79.0 - 97.0 fL Final          Sodium Sodium   Date Value Ref Range Status   02/13/2020 139 136 - 145 mmol/L Final   02/12/2020 138 136 - 145 mmol/L Final      Potassium Potassium   Date Value Ref Range Status   02/13/2020 3.5 3.5 - 5.2 mmol/L Final   02/12/2020 3.6 3.5 - 5.2 mmol/L Final      Chloride Chloride   Date Value Ref Range Status   02/13/2020 104 98 - 107 mmol/L Final   02/12/2020 103 98 - 107 mmol/L Final      CO2 CO2   Date Value Ref Range Status   02/13/2020 23.0 22.0 - 29.0 mmol/L Final   02/12/2020 22.0 22.0 - 29.0 mmol/L Final      BUN BUN   Date Value Ref Range Status   02/13/2020 44 (H) 8 - 23 mg/dL Final   02/12/2020 63 (H) 8 - 23 mg/dL Final      Creatinine Creatinine   Date Value Ref Range Status   02/13/2020 1.63 (H) 0.57 - 1.00 mg/dL Final   02/12/2020 2.63 (H) 0.57 - 1.00 mg/dL Final      Calcium Calcium   Date Value Ref Range Status   02/13/2020 8.2 (L) 8.6 - 10.5 mg/dL Final   02/12/2020 8.4 (L) 8.6 - 10.5 mg/dL Final      PO4 No results found for: CAPO4   Albumin Albumin   Date Value Ref Range Status   02/11/2020 2.7 (L) 2.9 - 4.4 g/dL Final      Magnesium No results found for: MG   Uric Acid No results found for: URICACID        Results Review:     I reviewed the patient's new clinical results.      amLODIPine 10 mg Oral Daily   aspirin 81 mg Oral Daily   carvedilol 12.5 mg Oral BID With Meals   heparin (porcine) 5,000 Units Subcutaneous Q12H   hydrALAZINE 25 mg Oral Q8H   insulin lispro 0-14 Units Subcutaneous 4x Daily With Meals & Nightly   insulin lispro 4 Units Subcutaneous TID With Meals   ipratropium-albuterol 3 mL Nebulization 4x Daily - RT   isosorbide mononitrate 60 mg Oral Q24H   latanoprost 1 drop Right Eye Nightly   nystatin  Topical Q12H   povidone-iodine  Topical Daily   timolol 1 drop  Both Eyes Q12H        RENAL ULTRASOUND:    FINDINGS: The right kidney measures in length from pole to pole 9.4 cm.  No solid cortical mass or renal cortical cyst seen within the right  kidney. No hydronephrosis or nephrolithiasis.     The left kidney is smaller in size measuring approximate 7.3 cm. There  is no solid cortical mass or renal cortical cyst seen within the left  kidney. No hydronephrosis or nephrolithiasis. Bladder is incompletely  distended and grossly unremarkable in appearance.     IMPRESSION:  Asymmetry seen in the size of the kidneys with the right  kidney being larger than the left. No gross abnormality seen within  either kidney.    Renal duplex:    FINDINGS: Right kidney measures 10.2 cm in length without gross  hydronephrosis. Peak systolic velocity at the level of the abdominal  aorta measures 61.6 cm/s. Limited visualization, however, parenchymal  resistive indices measure up to 0.75 which are elevated.     Left kidney not clearly visualized and suboptimal due to bowel gas and  overall body habitus.     IMPRESSION:  Limited evaluation due to several factors as detailed above  with nonvisualization of the left kidney. However within limits of the  study, there is noted increased resistive indices within the right  kidney from expected measuring up to 0.75 concern for renal artery  stenosis at least on the right.    Medication Review:     Assessment/Plan       Type 2 diabetes mellitus with diabetic nephropathy, with long-term current use of insulin (CMS/[facility])    Glaucoma    Debility    ATN (acute tubular necrosis) (CMS/[facility])    Renal artery stenosis, right    1- SALOMÓN - non oliguric - improving. UOP 1.5 lit/24 hrs. Baseline Scr 1.2. Likely Hypertensive crisis induced vs GN.   2- Metabolic acidosis -DKA - resolved  3- Hypertension crisis - improved.   4- Hyperkalemia -resolved.   5- Mild hyponatremia  6- Hyperglycemia - improved  7- Proteinuria - P/C ratio 0.4 DANA(-), ANCA (-),antiGBM(-) SFLC 2.57-  elevated because of  SALOMÓN    Plan:  - Continue with current. Renal function improving.   - Monitor I/O strictly  - Renal diet.   - Renal duplex - left side not visualized and right side with elevated RI - 0.75 and PSV is less than 180..I will avoid contrast study for further workup with SALOMÓN. CO 2 angio not available. Renal function improving. Will monitor.   -  Urine electrophoresis and SIEP pending. SFLC ratio is elevated but inconclusive because of renal insufficiency.     Follow up with [facility]AL in 2 weeks with renal function and UA.       Alcon Kellogg MD  02/14/20  10:42 AM

## 2020-02-14 NOTE — PLAN OF CARE
Problem: Patient Care Overview  Goal: Plan of Care Review  Outcome: Ongoing (interventions implemented as appropriate)  Flowsheets (Taken 2/14/2020 3970)  Progress: improving  Plan of Care Reviewed With: patient  Outcome Summary: pt rested well, VSS, plan to D/C to [place] today with family to transport

## 2020-02-14 NOTE — PLAN OF CARE
Problem: Patient Care Overview  Goal: Plan of Care Review  Flowsheets (Taken 2/14/2020 7392)  Plan of Care Reviewed With: patient; son  Note:   Patient follow up for right heel and left breast fold. Bilateral breast folds skin dry/pink, Nystatin powder in place. Right heel dry-betadine in place-no new concerns-patient preparing for discharge-WOC will sign off.

## 2020-02-14 NOTE — THERAPY TREATMENT NOTE
Patient Name: Varinder Delgado  : 1931    MRN: [id number]                              Today's Date: 2020       Admit Date: 2020    Visit Dx:     ICD-10-CM ICD-9-CM   1. Diabetic ketoacidosis without coma associated with other specified diabetes mellitus (CMS/HCC) E13.10 250.12   2. Hypertensive urgency I16.0 401.9   3. Renal failure, unspecified chronicity N19 586   4. Impaired mobility and ADLs Z74.09 799.89     Patient Active Problem List   Diagnosis   • Essential hypertension   • Mixed hyperlipidemia   • Tobacco abuse   • Coronary artery disease involving native coronary artery of native heart without angina pectoris   • Claudication (CMS/[facility])   • Lower extremity edema   • Type 2 diabetes mellitus with diabetic nephropathy, with long-term current use of insulin (CMS/[facility])   • Glaucoma   • Debility   • ATN (acute tubular necrosis) (CMS/[facility])   • Renal artery stenosis, right     Past Medical History:   Diagnosis Date   • Diabetes mellitus (CMS/[facility])    • Hyperlipidemia    • Hypertension    • MI (myocardial infarction) (CMS/[facility])      Past Surgical History:   Procedure Laterality Date   • CARDIAC CATHETERIZATION N/A 2018    Procedure: Left Heart Cath;  Surgeon: Sreekanth De La Rosa MD;  Location: [facility] CATH INVASIVE LOCATION;  Service: Cardiology   • MOUTH SURGERY       General Information     Row Name 20 1008          PT Evaluation Time/Intention    Document Type  therapy note (daily note)  -LS     Mode of Treatment  physical therapy  -LS     Row Name 20 1008          General Information    Existing Precautions/Restrictions  fall  -LS     Row Name 20 1008          Cognitive Assessment/Intervention- PT/OT    Orientation Status (Cognition)  oriented x 3  -LS       User Key  (r) = Recorded By, (t) = Taken By, (c) = Cosigned By    Initials Name Provider Type    LS Liana De, PT Physical Therapist        Mobility     Row Name 20 1009          Bed Mobility Assessment/Treatment     Supine-Sit [place] (Bed Mobility)  minimum assist (75% patient effort);verbal cues  -LS     Assistive Device (Bed Mobility)  bed rails;head of bed elevated  -LS     Row Name 02/14/20 1009          Transfer Assessment/Treatment    Comment (Transfers)  cues for hand placement  -LS     Row Name 02/14/20 1009          Sit-Stand Transfer    Sit-Stand [place] (Transfers)  minimum assist (75% patient effort);verbal cues  -LS     Assistive Device (Sit-Stand Transfers)  walker, front-wheeled  -LS     Row Name 02/14/20 1009          Gait/Stairs Assessment/Training    Gait/Stairs Assessment/Training  gait/ambulation independence  -LS     [place] Level (Gait)  minimum assist (75% patient effort);verbal cues  -LS     Assistive Device (Gait)  walker, front-wheeled  -LS     Distance in Feet (Gait)  40  -LS     Deviations/Abnormal Patterns (Gait)  bilateral deviations;stride length decreased  -LS     Bilateral Gait Deviations  forward flexed posture;heel strike decreased  -LS     Comment (Gait/Stairs)  distance limited by fatigue. VC's for upright posture and keeping RW close to body.   -LS       User Key  (r) = Recorded By, (t) = Taken By, (c) = Cosigned By    Initials Name Provider Type     Liana De, PT Physical Therapist        Obj/Interventions     Row Name 02/14/20 1010          Therapeutic Exercise    Lower Extremity (Therapeutic Exercise)  gluteal sets;quad sets, bilateral;LAQ (long arc quad), bilateral  -LS     Lower Extremity Range of Motion (Therapeutic Exercise)  ankle dorsiflexion/plantar flexion, bilateral  -     Exercise Type (Therapeutic Exercise)  AROM (active range of motion)  -LS     Position (Therapeutic Exercise)  seated  -LS     Sets/Reps (Therapeutic Exercise)  1/10  -     Row Name 02/14/20 1010          Static Sitting Balance    Level of [place] (Unsupported Sitting, Static Balance)  supervision  -LS     Sitting Position (Unsupported Sitting, Static Balance)  sitting on edge of  bed  -LS     Row Name 02/14/20 1010          Static Standing Balance    Level of [place] (Supported Standing, Static Balance)  contact guard assist  -LS     Assistive Device Utilized (Supported Standing, Static Balance)  walker, rolling  -LS       User Key  (r) = Recorded By, (t) = Taken By, (c) = Cosigned By    Initials Name Provider Type    Liana Escalera, PT Physical Therapist        Goals/Plan    No documentation.       Clinical Impression     Row Name 02/14/20 1010          Pain Scale: Numbers Pre/Post-Treatment    Pain Scale: Numbers, Pretreatment  0/10 - no pain  -LS     Pain Scale: Numbers, Post-Treatment  0/10 - no pain  -LS     Row Name 02/14/20 1010          Plan of Care Review    Plan of Care Reviewed With  patient;family  -     Progress  improving  -     Outcome Summary  Pt demonstrated ability to progress forward ambulation distance to 40 ft, BRYAN [place] use. Cont to be limited by fatigue and balance deficits with mobility, but very motivated to participate. Will cont PT POC.   -LS     Row Name 02/14/20 1010          Vital Signs    Pre Systolic BP Rehab  164  -LS     Pre Treatment Diastolic BP  66  -LS     Pretreatment Heart Rate (beats/min)  68  -LS     Posttreatment Heart Rate (beats/min)  68  -LS     Pre SpO2 (%)  95  -LS     O2 Delivery Pre Treatment  room air  -LS     O2 Delivery Intra Treatment  room air  -LS     Post SpO2 (%)  99  -LS     O2 Delivery Post Treatment  room air  -LS     Pre Patient Position  Supine  -LS     Intra Patient Position  Standing  -LS     Post Patient Position  Sitting  -LS     Row Name 02/14/20 1010          Positioning and Restraints    Pre-Treatment Position  in bed  -LS     Post Treatment Position  chair  -LS     In Chair  notified nsg;reclined;call light within reach;encouraged to call for assist;with other staff;waffle cushion;legs elevated;exit alarm on  -LS       User Key  (r) = Recorded By, (t) = Taken By, (c) = Cosigned By    Initials Name Provider Type     Liana Escalera, PT Physical Therapist        Outcome Measures     Row Name 02/14/20 1012          How much help from another person do you currently need...    Turning from your back to your side while in flat bed without using bedrails?  3  -LS     Moving from lying on back to sitting on the side of a flat bed without bedrails?  3  -LS     Moving to and from a bed to a chair (including a wheelchair)?  3  -LS     Standing up from a chair using your arms (e.g., wheelchair, bedside chair)?  3  -LS     Climbing 3-5 steps with a railing?  2  -LS     To walk in hospital room?  3  -LS     AM-PAC 6 Clicks Score (PT)  17  -LS       User Key  (r) = Recorded By, (t) = Taken By, (c) = Cosigned By    Initials Name Provider Type    Liana Escalera, PT Physical Therapist          PT Recommendation and Plan     Outcome Summary/Treatment Plan (PT)  Anticipated Discharge Disposition (PT): skilled nursing facility  Plan of Care Reviewed With: patient, family  Progress: improving  Outcome Summary: Pt demonstrated ability to progress forward ambulation distance to 40 ft, [place] RW use. Cont to be limited by fatigue and balance deficits with mobility, but very motivated to participate. Will cont PT POC.      Time Calculation:   PT Charges     Row Name 02/14/20 1013             Time Calculation    Start Time  0910  -      PT Received On  02/14/20  -         Time Calculation- PT    Total Timed Code Minutes- PT  23 minute(s)  -         Timed Charges    25053 - PT Therapeutic Exercise Minutes  10  -      39458 - Gait Training Minutes   10  -      62302 - PT Therapeutic Activity Minutes  3  -        User Key  (r) = Recorded By, (t) = Taken By, (c) = Cosigned By    Initials Name Provider Type    Liana Escalera, PT Physical Therapist        Therapy Charges for Today     Code Description Service Date Service Provider Modifiers Qty    28554081672 HC PT THER PROC EA 15 MIN 2/14/2020 Liana De, PT GP 1    [id number]   GAIT TRAINING EA 15 MIN 2/14/2020 Liana De, PT GP 1          PT G-Codes  Outcome Measure Options: AM-PAC 6 Clicks Basic Mobility (PT)  AM-PAC 6 Clicks Score (PT): 17  AM-PAC 6 Clicks Score (OT): 17    Liana De, PT  2/14/2020

## 2020-02-14 NOTE — DISCHARGE SUMMARY
[facility] Medicine Services  DISCHARGE SUMMARY    Patient Name: Varinder Delgado  : 1931  MRN: [id number]    Date of Admission: 2020  8:45 PM  Date of Discharge:  2020  Primary Care Physician: Santiago Kuhn MD    Consults     Date and Time Order Name Status Description    2020 0938 Inpatient Nephrology Consult            Hospital Course     Presenting Problem:   Diabetic ketoacidosis without coma associated with other specified diabetes mellitus (CMS/[facility]) [E13.10]    Active Hospital Problems    Diagnosis  POA   • Renal artery stenosis, right [I70.1]  Yes   • ATN (acute tubular necrosis) (CMS/[facility]) [N17.0]  Yes   • Type 2 diabetes mellitus with diabetic nephropathy, with long-term current use of insulin (CMS/[facility]) [E11.21, Z79.4]  Not Applicable   • Glaucoma [H40.9]  Yes   • Debility [R53.81]  Yes      Resolved Hospital Problems    Diagnosis Date Resolved POA   • **Diabetic acidosis without coma (CMS/[facility]) [E11.10] 2020 Yes   • Diarrhea of presumed infectious origin [R19.7] 2020 Yes   • Essential hypertension [I10] 2020 Yes   • Acute renal failure (ARF) (CMS/[facility]) [N17.9] 2020 Yes   • Hyponatremia [E87.1] 2020 Yes   • Metabolic acidosis, increased anion gap [E87.2] 2020 Yes   • Hypertensive emergency [I16.1] 2020 Yes   • Diabetes mellitus (CMS/[facility]) [E11.9] 2020 Yes          Hospital Course:  Varinder Delgado is a 88 y.o. female  with past medical history of insulin-dependent diabetes mellitus who presents to the hospital with diabetic ketoacidosis and acute renal failure in the setting of recent diarrheal illness that is now resolved.  She was found to have probable ATN due to dehydration from diabetic ketoacidosis.  Nephrology consult team has followed and assisted with management.  She has started to have gradual recovery of her renal function.  She also had hypertensive emergency and required Cardene drip earlier in  hospital stay.  Renal ultrasound is concerning for renal artery stenosis on the right.        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  Glucose still somewhat labile ranging from 105-303 over the past 24 hours  -Continuing lispro with meals and sliding scale insulin  -- we will continue insulin at transfer and defer to facility provider on restarting oral diabetic medication as oral intake improves     Acute renal failure due to ATN:  Likely prolonged prerenal failure from diarrhea caused ATN.  Nephrology following.    -Renal function continues to improve, creatinine 1.6 today  Patient has had some intermittent volume overload that has required Lasix.  -plan for follow-up in [facility] in 2 weeks  -Urine electrophoresis and SIEP remain pending and can be followed up in nephrology clinic as well  -Elevated resistance index on the right kidney, nephrology wishes to avoid, however, a contrasted study for further work-up of her acute kidney injury and CO2 angiogram is not available here.  Will defer further treatment to [facility] as well.     Right renal artery stenosis:  Noted on ultrasound.  Nephrology recommends to avoid hypotension and hold off further work-up or contrast until renal failure further improved.  Follow-up [facility] in 2 weeks     Hyponatremia:  Improved with IV fluids and renal recovery.  -Encouraged p.o. fluid intake      Debility: PT OT.  Recommend rehab at discharge.     Essential hypertension:  Avoid hypotension as per above due to renal failure and renal artery stenosis.  Gradual titration of medications.    Patient has remained clinically stable and will be discharged to rehab today. She will need to follow up with PCP one week. Follow up with Nephrology in 2 weeks.      Discharge Follow Up Recommendations for outpatient labs/diagnostics:   PCP one week   Follow up with [facility] in 2 weeks     Day of Discharge     HPI:   Patient is resting in bed in NAD with family at bedside. Patient slept well and feels good this am. Appetite good. Working with PT. No acute events overnight per nursing. Plan for rehab today.     Review of Systems  Gen- No fevers, chills  CV- No chest pain, palpitations  Resp- No cough, dyspnea  GI- No N/V/D, abd pain        Vital Signs:   Temp:  [98.7 °F (37.1 °C)] 98.7 °F (37.1 °C)  Heart Rate:  [61-70] 68  Resp:  [16-18] 16  BP: (152-164)/(61-66) 164/66     Physical Exam:  Constitutional -no acute distress, non toxic, sitting up in bed  HEENT-NCAT, mucous membranes moist  CV-RRR, S1 S2 normal, no m/r/g  Resp-CTAB, no wheezes, rhonchi or rales, room air 95%  Abd-soft, non-tender, non-distended, normo active bowel sounds, overweight  Ext-No lower extremity cyanosis, clubbing or edema bilaterally  Neuro-alert and oriented, speech clear, moves all extremities   Psych-normal affect   Skin- No rash on exposed UE or LE bilaterally    Pertinent  and/or Most Recent Results     Results from last 7 days   Lab Units 02/13/20  0416 02/12/20  0458 02/11/20  0407 02/10/20  0347 02/09/20  0405 02/09/20  0006 02/08/20  1809  02/07/20  2120   WBC 10*3/mm3 10.75 10.93* 13.02* 14.06* 10.16  --   --   --  15.23*   HEMOGLOBIN g/dL 10.6* 10.8* 11.0* 12.0 11.9*  --   --   --  15.9   HEMATOCRIT % 32.9* 32.4* 32.1* 35.2 35.5  --   --   --  48.2*   PLATELETS 10*3/mm3 251 209 207 217 206  --   --   --  332   SODIUM mmol/L 139 138 139 135* 133* 136 131*   < > 126*   POTASSIUM mmol/L 3.5 3.6 3.1* 3.9 3.9 4.1 4.7   < > 6.9*   CHLORIDE mmol/L 104 103 101 101 100 102 99   < > 91*   CO2 mmol/L 23.0 22.0 23.0 19.0* 18.0* 19.0* 14.0*   < > 15.0*   BUN mg/dL 44* 63* 78* 83* 80* 81* 78*   < > 86*   CREATININE mg/dL 1.63* 2.63* 3.60* 4.71* 5.09* 5.17* 5.35*   < > 6.44*   GLUCOSE mg/dL 96 166* 65 103* 125* 123* 227*   < > 603*   CALCIUM mg/dL 8.2* 8.4* 8.3* 8.2* 7.4* 7.6* 7.4*   < > 8.4*    < > =  values in this interval not displayed.     Results from last 7 days   Lab Units 02/07/20  2300 02/07/20  2120   BILIRUBIN mg/dL 0.3 0.3   ALK PHOS U/L 103 101   ALT (SGPT) U/L 9 14   AST (SGOT) U/L 16 40*           Invalid input(s): TG, LDLCALC, LDLREALC  Results from last 7 days   Lab Units 02/07/20  2300 02/07/20  2120   TSH uIU/mL 2.540  --    HEMOGLOBIN A1C %  --  12.90*   TROPONIN T ng/mL 0.029  --        Brief Urine Lab Results  (Last result in the past 365 days)      Color   Clarity   Blood   Leuk Est   Nitrite   Protein   CREAT   Urine HCG        02/08/20 1222             283.6             Microbiology Results Abnormal     Procedure Component Value - Date/Time    Urine Culture - Urine, Urine, Clean Catch [[id number]]  (Normal) Collected:  02/08/20 0556    Lab Status:  Final result Specimen:  Urine, Clean Catch Updated:  02/09/20 1039     Urine Culture No growth    Eosinophil Smear - Urine, Urine, Clean Catch [[id number]]  (Normal) Collected:  02/08/20 0556    Lab Status:  Final result Specimen:  Urine, Clean Catch Updated:  02/08/20 0919     Eosinophil Smear 0 % EOS/100 Cells     Narrative:       No eosinophil seen          Imaging Results (All)     Procedure Component Value Units Date/Time    US Renal Limited [[id number]] Collected:  02/08/20 1458     Updated:  02/10/20 1825    Narrative:       EXAMINATION: US RENAL LIMITED - 02/08/2020     INDICATION: E13.10-Other specified diabetes mellitus with ketoacidosis  without coma; I16.0-Hypertensive urgency; N19-Unspecified kidney  failure. Renal failure. Renal insufficiency.     TECHNIQUE: Sonographic imaging is obtained of the kidneys in both the  sagittal and transverse planes.     COMPARISON: None.     FINDINGS: The right kidney measures in length from pole to pole 9.4 cm.  No solid cortical mass or renal cortical cyst seen within the right  kidney. No hydronephrosis or nephrolithiasis.     The left kidney is smaller in size measuring approximate 7.3 cm.  There  is no solid cortical mass or renal cortical cyst seen within the left  kidney. No hydronephrosis or nephrolithiasis. Bladder is incompletely  distended and grossly unremarkable in appearance.       Impression:       Asymmetry seen in the size of the kidneys with the right  kidney being larger than the left. No gross abnormality seen within  either kidney.     DICTATED:   02/08/2020  EDITED/ls :   02/09/2020          This report was finalized on 2/10/2020 6:22 PM by Dr. Christelle Mcdonald MD.       XR Chest 1 View [[id number]] Collected:  02/07/20 2231     Updated:  02/07/20 2233    Narrative:       CR Chest 1 Vw    INDICATION:   88-year-old female with generalized weakness nausea and vomiting diabetes and hypertension. Hyperglycemia.     COMPARISON:    7/28/2018    FINDINGS:  Single portable AP view(s) of the chest.  The cardiac silhouette is within normal limits. The vascularity is unremarkable. Low lung volumes. There is some probable atelectasis or scarring in the right perihilar lung. No pneumothorax effusion or dense  consolidation. Improved appearance compared to the prior study.      Impression:         1. No dense consolidation or effusion.    Signer Name: Walter Jones MD   Signed: 2/7/2020 10:31 PM   Workstation Name: [facility]    [facility]          Results for orders placed during the hospital encounter of 02/07/20   Duplex Renal Artery - Bilateral Complete CAR    Narrative EXAMINATION: DUPLEX RENAL ARTERY BILATERAL COMPLETE CAR-      INDICATION: Renovascular hypertension.     TECHNIQUE: Ultrasound vascular renal duplex bilateral.        COMPARISON: None.     FINDINGS: Right kidney measures 10.2 cm in length without gross  hydronephrosis. Peak systolic velocity at the level of the abdominal  aorta measures 61.6 cm/s. Limited visualization, however, parenchymal  resistive indices measure up to 0.75 which are elevated.     Left kidney not clearly visualized and suboptimal  due to bowel gas and  overall body habitus.       Impression Limited evaluation due to several factors as detailed above  with nonvisualization of the left kidney. However within limits of the  study, there is noted increased resistive indices within the right  kidney from expected measuring up to 0.75 concern for renal artery  stenosis at least on the right.     D:  02/10/2020  E:  02/10/2020     This report was finalized on 2/10/2020 10:44 AM by Dr. Jerardo Kay.          Results for orders placed during the hospital encounter of 02/07/20   Duplex Renal Artery - Bilateral Complete CAR    Narrative EXAMINATION: DUPLEX RENAL ARTERY BILATERAL COMPLETE CAR-      INDICATION: Renovascular hypertension.     TECHNIQUE: Ultrasound vascular renal duplex bilateral.        COMPARISON: None.     FINDINGS: Right kidney measures 10.2 cm in length without gross  hydronephrosis. Peak systolic velocity at the level of the abdominal  aorta measures 61.6 cm/s. Limited visualization, however, parenchymal  resistive indices measure up to 0.75 which are elevated.     Left kidney not clearly visualized and suboptimal due to bowel gas and  overall body habitus.       Impression Limited evaluation due to several factors as detailed above  with nonvisualization of the left kidney. However within limits of the  study, there is noted increased resistive indices within the right  kidney from expected measuring up to 0.75 concern for renal artery  stenosis at least on the right.     D:  02/10/2020  E:  02/10/2020     This report was finalized on 2/10/2020 10:44 AM by Dr. Jerardo Kay.          Results for orders placed during the hospital encounter of 10/22/18   Adult Transthoracic Echo Limited W/ Cont if Necessary Per Protocol    Narrative · This was a limited echocardiogram to assess left ventricular ejection   fraction.  · Left ventricular systolic function is normal. Estimated EF = 60%.  · Left ventricular wall thickness is consistent with  moderate concentric   hypertrophy.          Plan for Follow-up of Pending Labs/Results:    Order Current Status    Protein Electrophoresis, 24 Hr Urine - Urine, Clean Catch In process        Discharge Details        Discharge Medications      New Medications      Instructions Start Date   insulin lispro 100 UNIT/ML injection  Commonly known as:  humaLOG   4 Units, Subcutaneous, 3 Times Daily With Meals      insulin lispro 100 UNIT/ML injection  Commonly known as:  humaLOG   0-14 Units, Subcutaneous, 4 Times Daily With Meals & Nightly  Blood glucose 150-199 mg/dL - 3 units   Blood glucose 200-249 mg/dL - 5 units   Blood glucose 250-299 mg/dL - 8 units   Blood glucose 300-349 mg/dL - 10 units   Blood glucose 350-400 mg/dL - 12 units   Blood glucose greater than 400 mg/dL - 14 units and call provider           ipratropium-albuterol 0.5-2.5 mg/3 ml nebulizer  Commonly known as:  DUO-NEB   3 mL, Nebulization, 4 Times Daily - RT      nystatin 663995 UNIT/GM powder  Commonly known as:  MYCOSTATIN   Topical, Every 12 Hours Scheduled      povidone-iodine 10 % external solution  Commonly known as:  BETADINE   Topical, Daily  Apply to right heel fissure    Start Date:  February 15, 2020        Changes to Medications      Instructions Start Date   carvedilol 12.5 MG tablet  Commonly known as:  COREG  What changed:    · medication strength  · See the new instructions.   12.5 mg, Oral, 2 Times Daily With Meals      hydrALAZINE 25 MG tablet  Commonly known as:  APRESOLINE  What changed:    · medication strength  · how much to take   25 mg, Oral, Every 8 Hours Scheduled         Continue These Medications      Instructions Start Date   acetaminophen 500 MG tablet  Commonly known as:  TYLENOL   500 mg, Oral, Every 6 Hours PRN      amLODIPine 10 MG tablet  Commonly known as:  NORVASC   10 mg, Oral, Daily      aspirin 81 MG chewable tablet   81 mg, Oral, Daily      isosorbide mononitrate 60 MG 24 hr tablet  Commonly known as:  IMDUR    60 mg, Oral, Every 24 Hours Scheduled      pitavastatin calcium 2 MG tablet tablet  Commonly known as:  LIVALO   2 mg, Oral, Nightly      timolol 0.5 % ophthalmic gel-forming  Commonly known as:  TIMOPTIC-XR   1 drop, Both Eyes, 2 Times Daily      TRAVATAN Z 0.004 % solution ophthalmic solution  Generic drug:  travoprost (CAROLINE free)   1 drop, Right Eye, Nightly         Stop These Medications    glipizide 5 MG tablet  Commonly known as:  GLUCOTROL     NOVOLOG FLEXPEN 100 UNIT/ML solution pen-injector sc pen  Generic drug:  insulin aspart            Allergies   Allergen Reactions   • Contrast Dye Swelling   • Enalapril Angioedema         Discharge Disposition:  Rehab Facility or Unit (DC - External)    Diet:  Hospital:  Diet Order   Procedures   • Diet Regular; Renal, Consistent Carbohydrate       Activity:  Activity Instructions     Measure Blood Pressure      Measure Weight      Up WIth Assist            Restrictions or Other Recommendations:         CODE STATUS:    Code Status and Medical Interventions:   Ordered at: 02/08/20 0141     Code Status:    CPR     Medical Interventions (Level of Support Prior to Arrest):    Full       Future Appointments   Date Time Provider Department Center   5/18/2020  1:00 PM Sreekanth De La Rosa MD [facility] None       Additional Instructions for the Follow-ups that You Need to Schedule     Discharge Follow-up with PCP   As directed       Currently Documented PCP:    Santiago Kuhn MD    PCP Phone Number:    [phone]     Follow Up Details:  pcp one week         Discharge Follow-up with Specified Provider: follow up with [facility]; 2 Weeks   As directed      To:  follow up with [facility]    Follow Up:  2 Weeks               Time Spent on Discharge:  35 minutes    Electronically signed by MING Elliott, 02/14/20, 8:57 AM.

## 2020-02-14 NOTE — DISCHARGE PLACEMENT REQUEST
"Selina Valladares (88 y.o. Female)     Date of Birth Social Security Number Address Home Phone MRN    08/06/1931  [address] [facility] [address] [phone] [id number]    Ep[employer]op[employer] Marital Status          Cheondoism        Admission Date Admission Type Admitting Provider Attending Provider Department, Room/Bed    2/7/20 Emergency Flo Tanner MD Sloan, Walker E, MD [facility] 4G, S452/1    Discharge Date Discharge Disposition Discharge Destination         Rehab Facility or Unit (DC - External)              Attending Provider:  Flo Tanner MD    Allergies:  Contrast Dye, Enalapril    Isolation:  None   Infection:  None   Code Status:  CPR    Ht:  157.5 cm (62\")   Wt:  70.1 kg (154 lb 8 oz)    Admission Cmt:  None   Principal Problem:  Diabetic acidosis without coma (CMS/HCC) [E11.10]                 Active Insurance as of 2/7/2020     Primary Coverage     Payor Plan Insurance Group Employer/Plan Group    HUMANA MEDICARE REPLACEMENT HUMANA MEDICARE REPLACEMENT [id number]     Payor Plan Address Payor Plan Phone Number Payor Plan Fax Number Effective Dates    [address] [address] [phone]  1/1/2020 - None Entered    [facility] [id number]       Subscriber Name Subscriber Birth Date Member ID       SELINA VALLADARES 8/6/1931 [id number]           Secondary Coverage     Payor Plan Insurance Group Employer/Plan Group    AETNA COMMERCIAL AETNA [id number]     Payor Plan Address Payor Plan Phone Number Payor Plan Fax Number Effective Dates    [address] [phone]  1/1/2018 - None Entered    [facility] [phone]       Subscriber Name Subscriber Birth Date Member ID       SELINA VALLADARES 8/6/1931 [id number]                 Emergency Contacts      (Rel.) Home Phone Work Phone Mobile Phone    Manisha Teague (Relative) [phone] -- --    Morgan Holloway (Son) -- -- [phone]               Discharge Summary      Christiana Peace, APRN at 02/14/20 0857              "         [facility] Medicine Services  DISCHARGE SUMMARY    Patient Name: Varinder Delgado  : 1931  MRN: [id number]    Date of Admission: 2020  8:45 PM  Date of Discharge:  2020  Primary Care Physician: Santiago Kuhn MD    Consults     Date and Time Order Name Status Description    2020 0938 Inpatient Nephrology Consult            Hospital Course     Presenting Problem:   Diabetic ketoacidosis without coma associated with other specified diabetes mellitus (CMS/[facility]) [E13.10]    Active Hospital Problems    Diagnosis  POA   • Renal artery stenosis, right [I70.1]  Yes   • ATN (acute tubular necrosis) (CMS/[facility]) [N17.0]  Yes   • Type 2 diabetes mellitus with diabetic nephropathy, with long-term current use of insulin (CMS/[facility]) [E11.21, Z79.4]  Not Applicable   • Glaucoma [H40.9]  Yes   • Debility [R53.81]  Yes      Resolved Hospital Problems    Diagnosis Date Resolved POA   • **Diabetic acidosis without coma (CMS/[facility]) [E11.10] 2020 Yes   • Diarrhea of presumed infectious origin [R19.7] 2020 Yes   • Essential hypertension [I10] 2020 Yes   • Acute renal failure (ARF) (CMS/[facility]) [N17.9] 2020 Yes   • Hyponatremia [E87.1] 2020 Yes   • Metabolic acidosis, increased anion gap [E87.2] 2020 Yes   • Hypertensive emergency [I16.1] 2020 Yes   • Diabetes mellitus (CMS/[facility]) [E11.9] 2020 Yes          Hospital Course:  Varinder Delgado is a 88 y.o. female  with past medical history of insulin-dependent diabetes mellitus who presents to the hospital with diabetic ketoacidosis and acute renal failure in the setting of recent diarrheal illness that is now resolved.  She was found to have probable ATN due to dehydration from diabetic ketoacidosis.  Nephrology consult team has followed and assisted with management.  She has started to have gradual recovery of her renal function.  She also had hypertensive emergency and required Cardene drip earlier in  hospital stay.  Renal ultrasound is concerning for renal artery stenosis on the right.        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  Glucose still somewhat labile ranging from 105-303 over the past 24 hours  -Continuing lispro with meals and sliding scale insulin  -- we will continue insulin at transfer and defer to facility provider on restarting oral diabetic medication as oral intake improves     Acute renal failure due to ATN:  Likely prolonged prerenal failure from diarrhea caused ATN.  Nephrology following.    -Renal function continues to improve, creatinine 1.6 today  Patient has had some intermittent volume overload that has required Lasix.  -plan for follow-up in [facility] in 2 weeks  -Urine electrophoresis and SIEP remain pending and can be followed up in nephrology clinic as well  -Elevated resistance index on the right kidney, nephrology wishes to avoid, however, a contrasted study for further work-up of her acute kidney injury and CO2 angiogram is not available here.  Will defer further treatment to [facility] as well.     Right renal artery stenosis:  Noted on ultrasound.  Nephrology recommends to avoid hypotension and hold off further work-up or contrast until renal failure further improved.  Follow-up [facility] in 2 weeks     Hyponatremia:  Improved with IV fluids and renal recovery.  -Encouraged p.o. fluid intake      Debility: PT OT.  Recommend rehab at discharge.     Essential hypertension:  Avoid hypotension as per above due to renal failure and renal artery stenosis.  Gradual titration of medications.    Patient has remained clinically stable and will be discharged to rehab today. She will need to follow up with PCP one week. Follow up with Nephrology in 2 weeks.      Discharge Follow Up Recommendations for outpatient labs/diagnostics:   PCP one week   Follow up with [facility] in 2 weeks     Day of Discharge     HPI:   Patient is resting in bed in NAD with family at bedside. Patient slept well and feels good this am. Appetite good. Working with PT. No acute events overnight per nursing. Plan for rehab today.     Review of Systems  Gen- No fevers, chills  CV- No chest pain, palpitations  Resp- No cough, dyspnea  GI- No N/V/D, abd pain        Vital Signs:   Temp:  [98.7 °F (37.1 °C)] 98.7 °F (37.1 °C)  Heart Rate:  [61-70] 68  Resp:  [16-18] 16  BP: (152-164)/(61-66) 164/66     Physical Exam:  Constitutional -no acute distress, non toxic, sitting up in bed  HEENT-NCAT, mucous membranes moist  CV-RRR, S1 S2 normal, no m/r/g  Resp-CTAB, no wheezes, rhonchi or rales, room air 95%  Abd-soft, non-tender, non-distended, normo active bowel sounds, overweight  Ext-No lower extremity cyanosis, clubbing or edema bilaterally  Neuro-alert and oriented, speech clear, moves all extremities   Psych-normal affect   Skin- No rash on exposed UE or LE bilaterally    Pertinent  and/or Most Recent Results     Results from last 7 days   Lab Units 02/13/20  0416 02/12/20  0458 02/11/20  0407 02/10/20  0347 02/09/20  0405 02/09/20  0006 02/08/20  1809  02/07/20  2120   WBC 10*3/mm3 10.75 10.93* 13.02* 14.06* 10.16  --   --   --  15.23*   HEMOGLOBIN g/dL 10.6* 10.8* 11.0* 12.0 11.9*  --   --   --  15.9   HEMATOCRIT % 32.9* 32.4* 32.1* 35.2 35.5  --   --   --  48.2*   PLATELETS 10*3/mm3 251 209 207 217 206  --   --   --  332   SODIUM mmol/L 139 138 139 135* 133* 136 131*   < > 126*   POTASSIUM mmol/L 3.5 3.6 3.1* 3.9 3.9 4.1 4.7   < > 6.9*   CHLORIDE mmol/L 104 103 101 101 100 102 99   < > 91*   CO2 mmol/L 23.0 22.0 23.0 19.0* 18.0* 19.0* 14.0*   < > 15.0*   BUN mg/dL 44* 63* 78* 83* 80* 81* 78*   < > 86*   CREATININE mg/dL 1.63* 2.63* 3.60* 4.71* 5.09* 5.17* 5.35*   < > 6.44*   GLUCOSE mg/dL 96 166* 65 103* 125* 123* 227*   < > 603*   CALCIUM mg/dL 8.2* 8.4* 8.3* 8.2* 7.4* 7.6* 7.4*   < > 8.4*    < > =  values in this interval not displayed.     Results from last 7 days   Lab Units 02/07/20  2300 02/07/20  2120   BILIRUBIN mg/dL 0.3 0.3   ALK PHOS U/L 103 101   ALT (SGPT) U/L 9 14   AST (SGOT) U/L 16 40*           Invalid input(s): TG, LDLCALC, LDLREALC  Results from last 7 days   Lab Units 02/07/20  2300 02/07/20  2120   TSH uIU/mL 2.540  --    HEMOGLOBIN A1C %  --  12.90*   TROPONIN T ng/mL 0.029  --        Brief Urine Lab Results  (Last result in the past 365 days)      Color   Clarity   Blood   Leuk Est   Nitrite   Protein   CREAT   Urine HCG        02/08/20 1222             283.6             Microbiology Results Abnormal     Procedure Component Value - Date/Time    Urine Culture - Urine, Urine, Clean Catch [[id number]]  (Normal) Collected:  02/08/20 0556    Lab Status:  Final result Specimen:  Urine, Clean Catch Updated:  02/09/20 1039     Urine Culture No growth    Eosinophil Smear - Urine, Urine, Clean Catch [[id number]]  (Normal) Collected:  02/08/20 0556    Lab Status:  Final result Specimen:  Urine, Clean Catch Updated:  02/08/20 0919     Eosinophil Smear 0 % EOS/100 Cells     Narrative:       No eosinophil seen          Imaging Results (All)     Procedure Component Value Units Date/Time    US Renal Limited [[id number]] Collected:  02/08/20 1458     Updated:  02/10/20 1825    Narrative:       EXAMINATION: US RENAL LIMITED - 02/08/2020     INDICATION: E13.10-Other specified diabetes mellitus with ketoacidosis  without coma; I16.0-Hypertensive urgency; N19-Unspecified kidney  failure. Renal failure. Renal insufficiency.     TECHNIQUE: Sonographic imaging is obtained of the kidneys in both the  sagittal and transverse planes.     COMPARISON: None.     FINDINGS: The right kidney measures in length from pole to pole 9.4 cm.  No solid cortical mass or renal cortical cyst seen within the right  kidney. No hydronephrosis or nephrolithiasis.     The left kidney is smaller in size measuring approximate 7.3 cm.  There  is no solid cortical mass or renal cortical cyst seen within the left  kidney. No hydronephrosis or nephrolithiasis. Bladder is incompletely  distended and grossly unremarkable in appearance.       Impression:       Asymmetry seen in the size of the kidneys with the right  kidney being larger than the left. No gross abnormality seen within  either kidney.     DICTATED:   02/08/2020  EDITED/ls :   02/09/2020          This report was finalized on 2/10/2020 6:22 PM by Dr. Christelle Mcdonald MD.       XR Chest 1 View [[id number]] Collected:  02/07/20 2231     Updated:  02/07/20 2233    Narrative:       CR Chest 1 Vw    INDICATION:   88-year-old female with generalized weakness nausea and vomiting diabetes and hypertension. Hyperglycemia.     COMPARISON:    7/28/2018    FINDINGS:  Single portable AP view(s) of the chest.  The cardiac silhouette is within normal limits. The vascularity is unremarkable. Low lung volumes. There is some probable atelectasis or scarring in the right perihilar lung. No pneumothorax effusion or dense  consolidation. Improved appearance compared to the prior study.      Impression:         1. No dense consolidation or effusion.    Signer Name: Walter Jones MD   Signed: 2/7/2020 10:31 PM   Workstation Name: [facility]    [facility]          Results for orders placed during the hospital encounter of 02/07/20   Duplex Renal Artery - Bilateral Complete CAR    Narrative EXAMINATION: DUPLEX RENAL ARTERY BILATERAL COMPLETE CAR-      INDICATION: Renovascular hypertension.     TECHNIQUE: Ultrasound vascular renal duplex bilateral.        COMPARISON: None.     FINDINGS: Right kidney measures 10.2 cm in length without gross  hydronephrosis. Peak systolic velocity at the level of the abdominal  aorta measures 61.6 cm/s. Limited visualization, however, parenchymal  resistive indices measure up to 0.75 which are elevated.     Left kidney not clearly visualized and suboptimal  due to bowel gas and  overall body habitus.       Impression Limited evaluation due to several factors as detailed above  with nonvisualization of the left kidney. However within limits of the  study, there is noted increased resistive indices within the right  kidney from expected measuring up to 0.75 concern for renal artery  stenosis at least on the right.     D:  02/10/2020  E:  02/10/2020     This report was finalized on 2/10/2020 10:44 AM by Dr. Jerardo Kay.          Results for orders placed during the hospital encounter of 02/07/20   Duplex Renal Artery - Bilateral Complete CAR    Narrative EXAMINATION: DUPLEX RENAL ARTERY BILATERAL COMPLETE CAR-      INDICATION: Renovascular hypertension.     TECHNIQUE: Ultrasound vascular renal duplex bilateral.        COMPARISON: None.     FINDINGS: Right kidney measures 10.2 cm in length without gross  hydronephrosis. Peak systolic velocity at the level of the abdominal  aorta measures 61.6 cm/s. Limited visualization, however, parenchymal  resistive indices measure up to 0.75 which are elevated.     Left kidney not clearly visualized and suboptimal due to bowel gas and  overall body habitus.       Impression Limited evaluation due to several factors as detailed above  with nonvisualization of the left kidney. However within limits of the  study, there is noted increased resistive indices within the right  kidney from expected measuring up to 0.75 concern for renal artery  stenosis at least on the right.     D:  02/10/2020  E:  02/10/2020     This report was finalized on 2/10/2020 10:44 AM by Dr. Jerardo Kay.          Results for orders placed during the hospital encounter of 10/22/18   Adult Transthoracic Echo Limited W/ Cont if Necessary Per Protocol    Narrative · This was a limited echocardiogram to assess left ventricular ejection   fraction.  · Left ventricular systolic function is normal. Estimated EF = 60%.  · Left ventricular wall thickness is consistent with  moderate concentric   hypertrophy.          Plan for Follow-up of Pending Labs/Results:    Order Current Status    Protein Electrophoresis, 24 Hr Urine - Urine, Clean Catch In process        Discharge Details        Discharge Medications      New Medications      Instructions Start Date   insulin lispro 100 UNIT/ML injection  Commonly known as:  humaLOG   4 Units, Subcutaneous, 3 Times Daily With Meals      insulin lispro 100 UNIT/ML injection  Commonly known as:  humaLOG   0-14 Units, Subcutaneous, 4 Times Daily With Meals & Nightly  Blood glucose 150-199 mg/dL - 3 units   Blood glucose 200-249 mg/dL - 5 units   Blood glucose 250-299 mg/dL - 8 units   Blood glucose 300-349 mg/dL - 10 units   Blood glucose 350-400 mg/dL - 12 units   Blood glucose greater than 400 mg/dL - 14 units and call provider           ipratropium-albuterol 0.5-2.5 mg/3 ml nebulizer  Commonly known as:  DUO-NEB   3 mL, Nebulization, 4 Times Daily - RT      nystatin 714723 UNIT/GM powder  Commonly known as:  MYCOSTATIN   Topical, Every 12 Hours Scheduled      povidone-iodine 10 % external solution  Commonly known as:  BETADINE   Topical, Daily  Apply to right heel fissure    Start Date:  February 15, 2020        Changes to Medications      Instructions Start Date   carvedilol 12.5 MG tablet  Commonly known as:  COREG  What changed:    · medication strength  · See the new instructions.   12.5 mg, Oral, 2 Times Daily With Meals      hydrALAZINE 25 MG tablet  Commonly known as:  APRESOLINE  What changed:    · medication strength  · how much to take   25 mg, Oral, Every 8 Hours Scheduled         Continue These Medications      Instructions Start Date   acetaminophen 500 MG tablet  Commonly known as:  TYLENOL   500 mg, Oral, Every 6 Hours PRN      amLODIPine 10 MG tablet  Commonly known as:  NORVASC   10 mg, Oral, Daily      aspirin 81 MG chewable tablet   81 mg, Oral, Daily      isosorbide mononitrate 60 MG 24 hr tablet  Commonly known as:  IMDUR    60 mg, Oral, Every 24 Hours Scheduled      pitavastatin calcium 2 MG tablet tablet  Commonly known as:  LIVALO   2 mg, Oral, Nightly      timolol 0.5 % ophthalmic gel-forming  Commonly known as:  TIMOPTIC-XR   1 drop, Both Eyes, 2 Times Daily      TRAVATAN Z 0.004 % solution ophthalmic solution  Generic drug:  travoprost (CAROLINE free)   1 drop, Right Eye, Nightly         Stop These Medications    glipizide 5 MG tablet  Commonly known as:  GLUCOTROL     NOVOLOG FLEXPEN 100 UNIT/ML solution pen-injector sc pen  Generic drug:  insulin aspart            Allergies   Allergen Reactions   • Contrast Dye Swelling   • Enalapril Angioedema         Discharge Disposition:  Rehab Facility or Unit (DC - External)    Diet:  Hospital:  Diet Order   Procedures   • Diet Regular; Renal, Consistent Carbohydrate       Activity:  Activity Instructions     Measure Blood Pressure      Measure Weight      Up WIth Assist            Restrictions or Other Recommendations:         CODE STATUS:    Code Status and Medical Interventions:   Ordered at: 02/08/20 0141     Code Status:    CPR     Medical Interventions (Level of Support Prior to Arrest):    Full       Future Appointments   Date Time Provider Department Center   5/18/2020  1:00 PM Sreekanth De La Rosa MD [facility] None       Additional Instructions for the Follow-ups that You Need to Schedule     Discharge Follow-up with PCP   As directed       Currently Documented PCP:    Santiago Kuhn MD    PCP Phone Number:    [phone]     Follow Up Details:  pcp one week         Discharge Follow-up with Specified Provider: follow up with [facility]; 2 Weeks   As directed      To:  follow up with [facility]    Follow Up:  2 Weeks               Time Spent on Discharge:  35 minutes    Electronically signed by MING Elliott, 02/14/20, 8:57 AM.        Electronically signed by Christiana Peace APRN at 02/14/20 0928

## 2020-03-30 RX ORDER — AMLODIPINE BESYLATE 10 MG/1
TABLET ORAL
Qty: 90 TABLET | Refills: 3 | OUTPATIENT
Start: 2020-03-30

## 2020-03-30 RX ORDER — AMLODIPINE BESYLATE 10 MG/1
10 TABLET ORAL DAILY
Qty: 90 TABLET | Refills: 3 | Status: SHIPPED | OUTPATIENT
Start: 2020-03-30 | End: 2021-01-01 | Stop reason: SDUPTHER

## 2020-05-11 ENCOUNTER — TELEPHONE (OUTPATIENT)
Dept: CARDIOLOGY | Facility: CLINIC | Age: 85
End: 2020-05-11

## 2020-05-11 NOTE — TELEPHONE ENCOUNTER
Patient's son, Morgan, called and LVM for return call regarding patients upcoming appt. (#[phone]). Family member is not listed on verbal release. Attempted to call patient, no answer, no voicemail available.

## 2020-05-26 NOTE — PROGRESS NOTES
[facility] [address], [address]  [place], [place], [address] [phone]  [email or web address]           OUTPATIENT CLINIC FOLLOW UP NOTE    Patient Care Team:  Patient Care Team:  Santiago Kuhn MD as PCP - General (Internal Medicine)    Subjective:      Chief Complaint   Patient presents with   • Coronary Artery Disease       HPI:    Varinder Delgado is a 88 y.o. female.  Cardiac problem list:  1. CAD  1. NSTEMI 7/2018 with TOSHA to the LAD.  2. Hypertension  3. Hyperlipidemia  4. Diabetes mellitus  5. Tobacco dependence    The patient presents today for follow-up.    After her last visit it was noted that she had DKA, acute renal insufficiency.  Hospitalized at that time.  Has improved.  Since then, her swelling has significantly improved.  Denies claudication-like symptoms.  Has arthritic-like hip pains when she lies down that improves with walking.  Denies associated chest pain, dyspnea, palpitations.    Review of Systems:  Positive for lower extremity swelling  Negative for exertional chest pain, dyspnea with exertion, orthopnea, PND, palpitations, lightheadedness, syncope.    PFSH:  Patient Active Problem List   Diagnosis   • Essential hypertension   • Mixed hyperlipidemia   • Tobacco abuse   • Coronary artery disease involving native coronary artery of native heart without angina pectoris   • Claudication (CMS/HCC)   • Lower extremity edema   • Type 2 diabetes mellitus with diabetic nephropathy, with long-term current use of insulin (CMS/HCC)   • Glaucoma   • Debility   • ATN (acute tubular necrosis) (CMS/HCC)   • Renal artery stenosis, right         Current Outpatient Medications:   •  acetaminophen (TYLENOL) 500 MG tablet, Take 500 mg by mouth Every 6 (Six) Hours As Needed for Mild Pain ., Disp: , Rfl:   •  amLODIPine (NORVASC) 10 MG tablet, Take 1 tablet by mouth Daily., Disp: 90 tablet, Rfl: 3  •  aspirin 81 MG chewable tablet, Chew 81 mg Daily., Disp: ,  Rfl:   •  carvedilol (COREG) 12.5 MG tablet, Take 1 tablet by mouth 2 (Two) Times a Day With Meals., Disp: , Rfl:   •  hydrALAZINE (APRESOLINE) 25 MG tablet, Take 1 tablet by mouth Every 8 (Eight) Hours. (Patient taking differently: Take 25 mg by mouth Daily.), Disp: , Rfl:   •  insulin lispro (humaLOG) 100 UNIT/ML injection, Inject 4 Units under the skin into the appropriate area as directed 3 (Three) Times a Day With Meals., Disp: , Rfl: 12  •  insulin lispro (humaLOG) 100 UNIT/ML injection, Inject 0-14 Units under the skin into the appropriate area as directed 4 (Four) Times a Day With Meals & at Bedtime., Disp: , Rfl: 12  •  ipratropium-albuterol (DUO-NEB) 0.5-2.5 mg/3 ml nebulizer, Take 3 mL by nebulization 4 (Four) Times a Day., Disp: 360 mL, Rfl:   •  isosorbide mononitrate (IMDUR) 60 MG 24 hr tablet, Take 1 tablet by mouth Daily., Disp: 90 tablet, Rfl: 3  •  nystatin (MYCOSTATIN) 566248 UNIT/GM powder, Apply  topically to the appropriate area as directed Every 12 (Twelve) Hours., Disp: , Rfl:   •  Pitavastatin Calcium 4 MG tablet, Take 1 tablet by mouth Every Night., Disp: 90 tablet, Rfl: 3  •  povidone-iodine (BETADINE) 10 % external solution, Apply  topically to the appropriate area as directed Daily., Disp: , Rfl:   •  timolol (TIMOPTIC-XR) 0.5 % ophthalmic gel-forming, Administer 1 drop to both eyes 2 (Two) Times a Day., Disp: , Rfl:   •  travoprost, CAROLINE free, (TRAVATAN Z) 0.004 % solution ophthalmic solution, Administer 1 drop to the right eye Every Night., Disp: , Rfl:     Allergies   Allergen Reactions   • Contrast Dye Swelling   • Enalapril Angioedema        reports that she quit smoking about 2 months ago. Her smoking use included cigarettes. She started smoking about 22 months ago. She has a 27.50 pack-year smoking history. She has never used smokeless tobacco.    Family History   Problem Relation Age of Onset   • Heart failure Mother    • Liver cancer Father    • Diabetes Brother    • Heart  "failure Brother    • Kidney disease Brother    • No Known Problems Maternal Grandmother    • No Known Problems Maternal Grandfather    • No Known Problems Paternal Grandmother    • No Known Problems Paternal Grandfather    • Pulmonary embolism Sister    • Diabetes Sister          Objective:   Blood pressure 144/60, pulse 70, height 157.5 cm (62\"), weight 67.1 kg (148 lb), SpO2 97 %.  CONSTITUTIONAL: No acute distress, normal affect  RESPIRATORY: Normal effort. Clear to auscultation bilaterally without wheezing or rales  CARDIOVASCULAR: Bilateral carotid bruit.  Regular rate and rhythm with normal S1 and S2. Without murmur, gallop or rub.  PERIPHERAL VASCULAR: Normal radial pulse on the left. There is mild 1+ pitting edema edema bilaterally.     2/3/2020 exam: Nonpalpable pedal pulses bilaterally    Labs:    BUN   Date Value Ref Range Status   02/13/2020 44 (H) 8 - 23 mg/dL Final     Creatinine   Date Value Ref Range Status   02/13/2020 1.63 (H) 0.57 - 1.00 mg/dL Final     Potassium   Date Value Ref Range Status   02/13/2020 3.5 3.5 - 5.2 mmol/L Final     ALT (SGPT)   Date Value Ref Range Status   02/07/2020 9 1 - 33 U/L Final     AST (SGOT)   Date Value Ref Range Status   02/07/2020 16 1 - 32 U/L Final       Lab Results   Component Value Date    CHOL 210 (H) 07/28/2018    CHOL 237 (H) 07/26/2018     Lab Results   Component Value Date    TRIG 63 07/28/2018    TRIG 179 (H) 07/26/2018     Lab Results   Component Value Date    HDL 48 07/28/2018    HDL 40 07/26/2018     No components found for: LDLCALC  No results found for: VLDL  No results found for: LDLHDL    Outside labs 7/2019  Hemoglobin 12.5  Hematocrit 38.2  Platelets 334  BUN 21  Creatinine 1.12  Sodium 141  Potassium 4.1  AST 22  ALT 18  HDL 60  Triglycerides 147  Total cholesterol 182  LDL 93    Diagnostic Data:    Procedures    Renal artery US 2/2020  Limited evaluation due to several factors as detailed above with nonvisualization of the left kidney.  " Right-sided resistive index elevated at 0.75.  RAR was 2.7.  Peak velocity 166cm/s.  Left-sided RAR was 1.9 with a peak velocity of 118 cm/s     TTE 10/2018  · This was a limited echocardiogram to assess left ventricular ejection fraction.  · Left ventricular systolic function is normal. Estimated EF = 60%.  · Left ventricular wall thickness is consistent with moderate concentric hypertrophy.    TTE 7/2018  · Left ventricular systolic function is moderately decreased. Estimated EF appears to be in the range of 31 - 35%.  · Left ventricular wall thickness is consistent with mild-to-moderate concentric hypertrophy.  · There is left ventricular global hypokinesis noted.  · Left ventricular diastolic dysfunction (grade I a) consistent with impaired relaxation.  · Mild mitral valve regurgitation is present  · There is a moderate (1-2cm) pericardial effusion adjacent to the right ventricle and adjacent to the right atrium.    [facility] 7/2018  · There was severe multivessel coronary artery disease involving the LAD and circumflex arteries.  · There is an 80% discrete proximal to mid LAD stenosis that is now status post intervention with a Xience Jolanta 2.5 x 38 mm drug-eluting stent with proximal optimization with a 3.0 mm in diameter noncompliant balloon to high atmosphere pressure.  · There is an 80% discrete apical artery stenosis that may be amenable to future balloon angioplasty if the patient has recurrent progressive symptoms.  · There is an 80% discrete stenosis in the distal AV groove artery just before the takeoff of the left PDA.  The segment may be amenable to future balloon angioplasty of the patient has recurrent progressive symptoms.  · There is an intermediate stenosis of the proximal first obtuse marginal branch as well as severe distal segment disease.  · There is an 80% ostial right common iliac artery stenosis with a peak to peak pullback gradient from the distal aorta of 20 mmHg.    Assessment and Plan:    Varinder was seen today for congestive heart failure.    Diagnoses and all orders for this visit:    Coronary artery disease involving native coronary artery of native heart without angina pectoris  Mixed hyperlipidemia  -CCS 0  -Continue ASA and beta blocker.  -Had myalgias with atorvastatin.  -Increase Livalo to 4mg p.o. Nightly.  -Repeat lipid panel and LFTs at next visit     Chronic systolic heart failure (CMS/HCC)  Lower extremity swelling  -NYHA I, EF has improved back to 60%  -Continue current related medications    Carotid Bruits  -ASA, statin  -Carotid Duplex US    CKD in setting of DKA  -Improved renal function     Possible claudication  -Improved after renal function improved. Has arthritic pains     Renal artery stenosis  -I reviewed the ultrasound results myself.  Has some medical renal disease and mild to moderate right renal artery atherosclerosis but no severe stenosis.  -Continue aspirin, statin    Essential hypertension  -Continue current medications     Tobacco Dependence  -Did not tolerate Chantix.  Reports smoking every other day    - Return in about 6 months (around 11/28/2020) for Next scheduled follow up with Alondra Perez.    Sreekanth De La Rosa MD, MSc, [facility]  Interventional Cardiology  [facility]

## 2020-05-28 ENCOUNTER — OFFICE VISIT (OUTPATIENT)
Dept: CARDIOLOGY | Facility: CLINIC | Age: 85
End: 2020-05-28

## 2020-05-28 VITALS
HEIGHT: 62 IN | SYSTOLIC BLOOD PRESSURE: 144 MMHG | BODY MASS INDEX: 27.23 KG/M2 | WEIGHT: 148 LBS | DIASTOLIC BLOOD PRESSURE: 60 MMHG | OXYGEN SATURATION: 97 % | HEART RATE: 70 BPM

## 2020-05-28 DIAGNOSIS — I10 ESSENTIAL HYPERTENSION: ICD-10-CM

## 2020-05-28 DIAGNOSIS — I25.10 CORONARY ARTERY DISEASE INVOLVING NATIVE CORONARY ARTERY OF NATIVE HEART WITHOUT ANGINA PECTORIS: Primary | ICD-10-CM

## 2020-05-28 DIAGNOSIS — R09.89 BILATERAL CAROTID BRUITS: ICD-10-CM

## 2020-05-28 DIAGNOSIS — I70.1 RENAL ARTERY STENOSIS (HCC): ICD-10-CM

## 2020-05-28 DIAGNOSIS — E78.2 MIXED HYPERLIPIDEMIA: ICD-10-CM

## 2020-05-28 PROCEDURE — 99214 OFFICE O/P EST MOD 30 MIN: CPT | Performed by: INTERNAL MEDICINE

## 2020-06-08 ENCOUNTER — HOSPITAL ENCOUNTER (OUTPATIENT)
Dept: CARDIOLOGY | Facility: HOSPITAL | Age: 85
Discharge: HOME | End: 2020-06-08
Admitting: INTERNAL MEDICINE

## 2020-06-08 VITALS — BODY MASS INDEX: 27.23 KG/M2 | WEIGHT: 148 LBS | HEIGHT: 62 IN

## 2020-06-08 DIAGNOSIS — R09.89 BILATERAL CAROTID BRUITS: ICD-10-CM

## 2020-06-08 LAB
BH CV ECHO MEAS - BSA(HAYCOCK): 2 M^2
BH CV ECHO MEAS - BSA: 1.9 M^2
BH CV ECHO MEAS - BZI_BMI: 34.4 KILOGRAMS/M^2
BH CV ECHO MEAS - BZI_METRIC_HEIGHT: 157.5 CM
BH CV ECHO MEAS - BZI_METRIC_WEIGHT: 85.3 KG
BH CV LEFT CCA HIDDEN LRR: 1 CM/S
BH CV XLRA MEAS LEFT CCA RATIO VEL: 95.1 CM/SEC
BH CV XLRA MEAS LEFT DIST CCA EDV: 18.9 CM/SEC
BH CV XLRA MEAS LEFT DIST CCA PSV: 91.1 CM/SEC
BH CV XLRA MEAS LEFT ICA RATIO VEL: 182 CM/SEC
BH CV XLRA MEAS LEFT ICA/CCA RATIO: 1.9
BH CV XLRA MEAS LEFT MID CCA EDV: 16.5 CM/SEC
BH CV XLRA MEAS LEFT MID CCA PSV: 95.9 CM/SEC
BH CV XLRA MEAS LEFT MID ICA EDV: 25.3 CM/SEC
BH CV XLRA MEAS LEFT MID ICA PSV: 84.7 CM/SEC
BH CV XLRA MEAS LEFT PROX CCA EDV: 26.4 CM/SEC
BH CV XLRA MEAS LEFT PROX CCA PSV: 116.9 CM/SEC
BH CV XLRA MEAS LEFT PROX ECA PSV: 392.9 CM/SEC
BH CV XLRA MEAS LEFT PROX ICA EDV: 25.1 CM/SEC
BH CV XLRA MEAS LEFT PROX ICA PSV: 183.9 CM/SEC
BH CV XLRA MEAS LEFT PROX SCLA PSV: 222.5 CM/SEC
BH CV XLRA MEAS LEFT VERTEBRAL A PSV: 21 CM/SEC
BH CV XLRA MEAS RIGHT CCA RATIO VEL: 71.7 CM/SEC
BH CV XLRA MEAS RIGHT DIST CCA EDV: 12.1 CM/SEC
BH CV XLRA MEAS RIGHT DIST CCA PSV: 52.9 CM/SEC
BH CV XLRA MEAS RIGHT ICA RATIO VEL: 69.8 CM/SEC
BH CV XLRA MEAS RIGHT ICA/CCA RATIO: 0.97
BH CV XLRA MEAS RIGHT MID CCA EDV: 18.2 CM/SEC
BH CV XLRA MEAS RIGHT MID CCA PSV: 72.2 CM/SEC
BH CV XLRA MEAS RIGHT MID ICA EDV: 20.1 CM/SEC
BH CV XLRA MEAS RIGHT MID ICA PSV: 70.4 CM/SEC
BH CV XLRA MEAS RIGHT PROX CCA EDV: 9.4 CM/SEC
BH CV XLRA MEAS RIGHT PROX CCA PSV: 57.9 CM/SEC
BH CV XLRA MEAS RIGHT PROX ECA PSV: 237.1 CM/SEC
BH CV XLRA MEAS RIGHT PROX ICA EDV: 22 CM/SEC
BH CV XLRA MEAS RIGHT PROX ICA PSV: 57.2 CM/SEC
BH CV XLRA MEAS RIGHT PROX SCLA PSV: 144.9 CM/SEC
BH CV XLRA MEAS RIGHT VERTEBRAL A PSV: 37.1 CM/SEC
LEFT ARM BP: NORMAL MMHG
RIGHT ARM BP: NORMAL MMHG

## 2020-06-08 PROCEDURE — 93880 EXTRACRANIAL BILAT STUDY: CPT

## 2020-06-08 PROCEDURE — 93880 EXTRACRANIAL BILAT STUDY: CPT | Performed by: INTERNAL MEDICINE

## 2020-06-09 ENCOUNTER — TELEPHONE (OUTPATIENT)
Dept: CARDIOLOGY | Facility: CLINIC | Age: 85
End: 2020-06-09

## 2020-06-09 NOTE — TELEPHONE ENCOUNTER
Attempted to contact patient to review carotid ultrasound. No voicemail available. Will await return call.

## 2020-06-09 NOTE — TELEPHONE ENCOUNTER
----- Message from Sreekanth De La Rosa MD sent at 6/8/2020  7:33 PM EDT -----  Please let the patient know she has mild to moderate plaque in her carotid arteries.  We will continue her current medical regimen and monitor

## 2020-07-16 RX ORDER — HYDRALAZINE HYDROCHLORIDE 25 MG/1
25 TABLET, FILM COATED ORAL EVERY 8 HOURS SCHEDULED
Qty: 90 TABLET | Refills: 1 | Status: SHIPPED | OUTPATIENT
Start: 2020-07-16 | End: 2020-08-07

## 2020-08-07 RX ORDER — HYDRALAZINE HYDROCHLORIDE 25 MG/1
25 TABLET, FILM COATED ORAL EVERY 8 HOURS SCHEDULED
Qty: 90 TABLET | Refills: 1 | Status: SHIPPED | OUTPATIENT
Start: 2020-08-07 | End: 2020-11-30 | Stop reason: SDUPTHER

## 2020-11-09 ENCOUNTER — APPOINTMENT (OUTPATIENT)
Dept: GENERAL RADIOLOGY | Facility: HOSPITAL | Age: 85
End: 2020-11-09

## 2020-11-09 ENCOUNTER — HOSPITAL ENCOUNTER (EMERGENCY)
Facility: HOSPITAL | Age: 85
Discharge: HOME | End: 2020-11-10
Attending: EMERGENCY MEDICINE | Admitting: EMERGENCY MEDICINE

## 2020-11-09 ENCOUNTER — APPOINTMENT (OUTPATIENT)
Dept: CT IMAGING | Facility: HOSPITAL | Age: 85
End: 2020-11-09

## 2020-11-09 VITALS
DIASTOLIC BLOOD PRESSURE: 68 MMHG | WEIGHT: 150 LBS | SYSTOLIC BLOOD PRESSURE: 166 MMHG | RESPIRATION RATE: 18 BRPM | TEMPERATURE: 97.8 F | HEIGHT: 62 IN | HEART RATE: 66 BPM | OXYGEN SATURATION: 97 % | BODY MASS INDEX: 27.6 KG/M2
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
## 2020-11-09 DIAGNOSIS — S00.03XA SCALP HEMATOMA, INITIAL ENCOUNTER: ICD-10-CM
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
## 2020-11-09 PROCEDURE — 73030 X-RAY EXAM OF SHOULDER: CPT

## 2020-11-09 PROCEDURE — 73502 X-RAY EXAM HIP UNI 2-3 VIEWS: CPT

## 2020-11-09 PROCEDURE — 70450 CT HEAD/BRAIN W/O DYE: CPT

## 2020-11-09 PROCEDURE — 99283 EMERGENCY DEPT VISIT LOW MDM: CPT

## 2020-11-10 NOTE — ED PROVIDER NOTES
Subjective   Patient is a very pleasant 89-year-old female who presents emergency room today for evaluation following a fall.  Patient states that earlier today she was walking from her living room to her kitchen and wearing only socks when she slipped on the wooden mookie and fell, hitting her head.She shares that following the fall she was doing well but then noticed a lump in the back of her head and that as the day progressed she became achy in her right shoulder and right hip.  She states that she was concerned after the fall because she recently had a friend who is 91 years old passed away following a fall from a bleed in her brain.  Patient is not on any blood thinners and denies loss of consciousness.  She shares that she did not have any symptoms preceding the fall and that it was simply a mechanical fall.          Review of Systems  10 point review of systems negative except as stated previously in history of present illness  Past Medical History:   Diagnosis Date   • Diabetes mellitus (CMS/HCC)    • Hyperlipidemia    • Hypertension    • MI (myocardial infarction) (CMS/HCC)        Allergies   Allergen Reactions   • Contrast Dye Swelling   • Enalapril Angioedema       Past Surgical History:   Procedure Laterality Date   • CARDIAC CATHETERIZATION N/A 7/27/2018    Procedure: Left Heart Cath;  Surgeon: Sreekanth De La Rosa MD;  Location: [facility] CATH INVASIVE LOCATION;  Service: Cardiology   • MOUTH SURGERY         Family History   Problem Relation Age of Onset   • Heart failure Mother    • Liver cancer Father    • Diabetes Brother    • Heart failure Brother    • Kidney disease Brother    • No Known Problems Maternal Grandmother    • No Known Problems Maternal Grandfather    • No Known Problems Paternal Grandmother    • No Known Problems Paternal Grandfather    • Pulmonary embolism Sister    • Diabetes Sister        Social History     Socioeconomic History   • Marital status:      Spouse name: Not on file   •  Number of children: Not on file   • Years of education: Not on file   • Highest education level: Not on file   Occupational History   • Occupation: Retired   Tobacco Use   • Smoking status: Former Smoker     Packs/day: 0.50     Years: 55.00     Pack years: 27.50     Types: Cigarettes     Start date: 2018     Quit date: 2020     Years since quittin.7   • Smokeless tobacco: Never Used   Substance and Sexual Activity   • Alcohol use: No   • Drug use: No   • Sexual activity: Defer   Social History Narrative    Caffeine intake:1-2  servings per day    Patientt  Lives alone at her home           Objective   Physical Exam  Vitals signs and nursing note reviewed.   Constitutional:       General: She is not in acute distress.     Appearance: She is well-developed.   HENT:      Head:      Comments: Hematoma to posterior scalp no laceration or evidence of bleeding  Eyes:      Conjunctiva/sclera: Conjunctivae normal.   Neck:      Musculoskeletal: Normal range of motion and neck supple.   Cardiovascular:      Rate and Rhythm: Normal rate and regular rhythm.   Pulmonary:      Effort: Pulmonary effort is normal. No respiratory distress.      Breath sounds: Normal breath sounds.   Abdominal:      General: There is no distension.      Palpations: Abdomen is soft.      Tenderness: There is no abdominal tenderness.   Musculoskeletal: Normal range of motion.      Right shoulder: She exhibits tenderness.      Right hip: She exhibits tenderness.   Skin:     General: Skin is warm and dry.   Neurological:      Mental Status: She is alert and oriented to person, place, and time.   Psychiatric:         Behavior: Behavior normal.         Thought Content: Thought content normal.         Judgment: Judgment normal.         Procedures           ED Course  ED Course as of Nov 10 0457   Tue Nov 10, 2020   0455 Patient presents to ED for evaluation following a mechanical fall.  Neurologically intact and without any acute/emergent  abnormalities appreciated on physical exam.  Scalp hematoma noted without evidence of bleeding or external injuries.  Negative CT scan of the head.  Patient also with complaints of right hip and right shoulder pain.  X-rays without acute fractures or dislocations of hip and pelvis in addition to right shoulder.  Patient will be discharged home with symptomatic care and outpatient follow-up to her primary care as recommended.  Patient is afebrile, nontoxic-appearing and vital signs stable, able to maintain O2 sats of 97% on room air, provided clear return precautions and showed understanding.    [JG]      ED Course User Index  [JG] Walter Alonzo, PA      No results found for this or any previous visit (from the past 24 hour(s)).  Note: In addition to lab results from this visit, the labs listed above may include labs taken at another facility or during a different encounter within the last 24 hours. Please correlate lab times with ED admission and discharge times for further clarification of the services performed during this visit.    CT Head Without Contrast   Preliminary Result   Chronic changes seen within the brain with no acute   intracranial abnormality identified.       D:  11/09/2020   E:  11/09/2020                  XR Hip With or Without Pelvis 2 - 3 View Right   Preliminary Result   Moderate degenerative changes seen of the hips bilaterally   with no evidence of fracture or dislocation.       D:  11/09/2020   E:  11/09/2020              XR Shoulder 2+ View Right   Preliminary Result   Severe degenerative changes seen of the right shoulder with   no evidence of fracture or dislocation.       D:  11/09/2020   E:  11/09/2020                Vitals:    11/09/20 1652 11/09/20 2304 11/09/20 2308   BP: (!) 211/92 166/68    BP Location: Left arm     Patient Position: Sitting     Pulse: 70  66   Resp: 18  18   Temp: 97.8 °F (36.6 °C)     TempSrc: Infrared     SpO2: 99% 98% 97%   Weight: 68 kg (150 lb)     Height:  "157.5 cm (62\")       Medications - No data to display  ECG/EMG Results (last 24 hours)     ** No results found for the last 24 hours. **        No orders to display     DISCHARGE    Patient discharged in stable condition.    Reviewed implications of results, diagnosis, meds, responsibility to follow up, warning signs and symptoms of possible worsening, potential complications and reasons to return to ER.    Patient/Family voiced understanding of above instructions.    Discussed plan for discharge, as there is no emergent indication for admission.  Pt/family is agreeable and understands need for follow up and possible repeat testing.  Pt/family is aware that discharge does not mean that nothing is wrong but that it indicates no emergency is currently present that requires admission and they must continue care with follow-up as given below or with a physician of their choice.     FOLLOW-UP  Santiago Kuhn MD  [address] [id number] [phone]      As needed    [facility] Emergency Department  [address] [address] [phone]  Go to   If symptoms worsen         Medication List      No changes were made to your prescriptions during this visit.                                          MDM  Number of Diagnoses or Management Options  Fall at home, initial encounter: new and requires workup  Scalp hematoma, initial encounter: new and requires workup     Amount and/or Complexity of Data Reviewed  Tests in the radiology section of CPT®: reviewed    Risk of Complications, Morbidity, and/or Mortality  Presenting problems: moderate  Diagnostic procedures: moderate  Management options: moderate    Patient Progress  Patient progress: stable      Final diagnoses:   Fall at home, initial encounter   Scalp hematoma, initial encounter            Walter Alonzo PA  11/10/20 0458    "

## 2020-11-30 ENCOUNTER — OFFICE VISIT (OUTPATIENT)
Dept: CARDIOLOGY | Facility: CLINIC | Age: 85
End: 2020-11-30

## 2020-11-30 VITALS
BODY MASS INDEX: 28.16 KG/M2 | HEART RATE: 67 BPM | DIASTOLIC BLOOD PRESSURE: 68 MMHG | SYSTOLIC BLOOD PRESSURE: 162 MMHG | WEIGHT: 153 LBS | OXYGEN SATURATION: 99 % | HEIGHT: 62 IN

## 2020-11-30 DIAGNOSIS — R09.89 BILATERAL CAROTID BRUITS: ICD-10-CM

## 2020-11-30 DIAGNOSIS — I10 ESSENTIAL HYPERTENSION: ICD-10-CM

## 2020-11-30 DIAGNOSIS — I25.10 CORONARY ARTERY DISEASE INVOLVING NATIVE CORONARY ARTERY OF NATIVE HEART WITHOUT ANGINA PECTORIS: Primary | ICD-10-CM

## 2020-11-30 DIAGNOSIS — I50.22 CHRONIC SYSTOLIC HEART FAILURE (HCC): ICD-10-CM

## 2020-11-30 DIAGNOSIS — E78.2 MIXED HYPERLIPIDEMIA: ICD-10-CM

## 2020-11-30 DIAGNOSIS — I70.1 RENAL ARTERY STENOSIS (HCC): ICD-10-CM

## 2020-11-30 PROCEDURE — 99214 OFFICE O/P EST MOD 30 MIN: CPT | Performed by: NURSE PRACTITIONER

## 2020-11-30 RX ORDER — HYDRALAZINE HYDROCHLORIDE 50 MG/1
50 TABLET, FILM COATED ORAL EVERY 8 HOURS SCHEDULED
Qty: 90 TABLET | Refills: 11 | Status: SHIPPED | OUTPATIENT
Start: 2020-11-30 | End: 2022-01-01

## 2020-11-30 NOTE — PROGRESS NOTES
[facility] [address], [address]  [place], [place], [address] [phone]  WWW.[facility]           OUTPATIENT CLINIC FOLLOW UP NOTE    Patient Care Team:  Patient Care Team:  Santiago Kuhn MD as PCP - General (Internal Medicine)    Subjective:      Chief Complaint   Patient presents with   • Coronary Artery Disease       HPI:    Varinder Delgado is a 89 y.o. female.  Cardiac problem list:  1. CAD  1. NSTEMI 7/2018 with TOSHA to the LAD.  2. Hypertension  3. Hyperlipidemia  4. Diabetes mellitus  5. Tobacco dependence    The patient presents today for follow-up.    Since the patient was last seen she reports that she has been doing well from a cardiac standpoint.  She denies any chest pain, shortness of breath, lower extremity edema, lightheadedness, syncope, or palpitations.  She does report 1 fall.  She is unsure why she fell but denies any lightheadedness, syncope, chest pain, or palpitations prior to falling.    Review of Systems:  Positive for fall  Negative for exertional chest pain, dyspnea with exertion, orthopnea, PND, lower extremity edema, palpitations, lightheadedness, syncope.     PFSH:  Patient Active Problem List   Diagnosis   • Essential hypertension   • Mixed hyperlipidemia   • Tobacco abuse   • Coronary artery disease involving native coronary artery of native heart without angina pectoris   • Claudication (CMS/HCC)   • Lower extremity edema   • Type 2 diabetes mellitus with diabetic nephropathy, with long-term current use of insulin (CMS/HCC)   • Glaucoma   • Debility   • ATN (acute tubular necrosis) (CMS/HCC)   • Renal artery stenosis, right         Current Outpatient Medications:   •  acetaminophen (TYLENOL) 500 MG tablet, Take 500 mg by mouth Every 6 (Six) Hours As Needed for Mild Pain ., Disp: , Rfl:   •  amLODIPine (NORVASC) 10 MG tablet, Take 1 tablet by mouth Daily., Disp: 90 tablet, Rfl: 3  •  aspirin 81 MG chewable tablet, Chew 81 mg  "Daily., Disp: , Rfl:   •  carvedilol (COREG) 12.5 MG tablet, Take 1 tablet by mouth 2 (Two) Times a Day With Meals., Disp: , Rfl:   •  hydrALAZINE (APRESOLINE) 25 MG tablet, TAKE 1 TABLET BY MOUTH EVERY 8 (EIGHT) HOURS., Disp: 90 tablet, Rfl: 1  •  insulin lispro (humaLOG) 100 UNIT/ML injection, Inject 0-14 Units under the skin into the appropriate area as directed 4 (Four) Times a Day With Meals & at Bedtime., Disp: , Rfl: 12  •  ipratropium-albuterol (DUO-NEB) 0.5-2.5 mg/3 ml nebulizer, Take 3 mL by nebulization 4 (Four) Times a Day., Disp: 360 mL, Rfl:   •  isosorbide mononitrate (IMDUR) 60 MG 24 hr tablet, Take 1 tablet by mouth Daily., Disp: 90 tablet, Rfl: 3  •  Pitavastatin Calcium 4 MG tablet, Take 1 tablet by mouth Every Night., Disp: 90 tablet, Rfl: 3  •  povidone-iodine (BETADINE) 10 % external solution, Apply  topically to the appropriate area as directed Daily., Disp: , Rfl:   •  timolol (TIMOPTIC-XR) 0.5 % ophthalmic gel-forming, Administer 1 drop to both eyes 2 (Two) Times a Day., Disp: , Rfl:   •  travoprost, CAROLINE free, (TRAVATAN Z) 0.004 % solution ophthalmic solution, Administer 1 drop to the right eye Every Night., Disp: , Rfl:     Allergies   Allergen Reactions   • Contrast Dye Swelling   • Enalapril Angioedema        reports that she quit smoking about 9 months ago. Her smoking use included cigarettes. She started smoking about 2 years ago. She has a 27.50 pack-year smoking history. She has never used smokeless tobacco.    Family History   Problem Relation Age of Onset   • Heart failure Mother    • Liver cancer Father    • Diabetes Brother    • Heart failure Brother    • Kidney disease Brother    • No Known Problems Maternal Grandmother    • No Known Problems Maternal Grandfather    • No Known Problems Paternal Grandmother    • No Known Problems Paternal Grandfather    • Pulmonary embolism Sister    • Diabetes Sister          Objective:   Blood pressure 162/68, pulse 67, height 157.5 cm (62.01\"), " weight 69.4 kg (153 lb), SpO2 99 %.  CONSTITUTIONAL: No acute distress  RESPIRATORY: Normal effort. Clear to auscultation bilaterally without wheezing or rales  CARDIOVASCULAR: Regular rate and rhythm with normal S1 and S2. Without murmur, gallop or rub.  Bilateral carotid bruits.  PERIPHERAL VASCULAR: Normal radial pulse. There is no lower extremity edema bilaterally.  PSYCH: Normal affect and mood      2/3/2020 exam: Nonpalpable pedal pulses bilaterally    Labs:    BUN   Date Value Ref Range Status   02/13/2020 44 (H) 8 - 23 mg/dL Final     Creatinine   Date Value Ref Range Status   02/13/2020 1.63 (H) 0.57 - 1.00 mg/dL Final     Potassium   Date Value Ref Range Status   02/13/2020 3.5 3.5 - 5.2 mmol/L Final     ALT (SGPT)   Date Value Ref Range Status   02/07/2020 9 1 - 33 U/L Final     AST (SGOT)   Date Value Ref Range Status   02/07/2020 16 1 - 32 U/L Final       Lab Results   Component Value Date    CHOL 210 (H) 07/28/2018    CHOL 237 (H) 07/26/2018     Lab Results   Component Value Date    TRIG 63 07/28/2018    TRIG 179 (H) 07/26/2018     Lab Results   Component Value Date    HDL 48 07/28/2018    HDL 40 07/26/2018     No components found for: LDLCALC  No results found for: VLDL  No results found for: LDLHDL    Outside labs 7/2019  Hemoglobin 12.5, Hematocrit 38.2, Platelets 334, BUN 21, Creatinine 1.12, Sodium 141, Potassium 4.1, AST 22, ALT 18, HDL 60, Triglycerides 147, Total cholesterol 182, LDL 93    Diagnostic Data:    Procedures    Renal artery US 2/2020  Limited evaluation due to several factors as detailed above with nonvisualization of the left kidney.  Right-sided resistive index elevated at 0.75.  RAR was 2.7.  Peak velocity 166cm/s.  Left-sided RAR was 1.9 with a peak velocity of 118 cm/s     TTE 10/2018  · This was a limited echocardiogram to assess left ventricular ejection fraction.  · Left ventricular systolic function is normal. Estimated EF = 60%.  · Left ventricular wall thickness is  consistent with moderate concentric hypertrophy.    TTE 7/2018  · Left ventricular systolic function is moderately decreased. Estimated EF appears to be in the range of 31 - 35%.  · Left ventricular wall thickness is consistent with mild-to-moderate concentric hypertrophy.  · There is left ventricular global hypokinesis noted.  · Left ventricular diastolic dysfunction (grade I a) consistent with impaired relaxation.  · Mild mitral valve regurgitation is present  · There is a moderate (1-2cm) pericardial effusion adjacent to the right ventricle and adjacent to the right atrium.    [facility] 7/2018  · There was severe multivessel coronary artery disease involving the LAD and circumflex arteries.  · There is an 80% discrete proximal to mid LAD stenosis that is now status post intervention with a Xience Jolanta 2.5 x 38 mm drug-eluting stent with proximal optimization with a 3.0 mm in diameter noncompliant balloon to high atmosphere pressure.  · There is an 80% discrete apical artery stenosis that may be amenable to future balloon angioplasty if the patient has recurrent progressive symptoms.  · There is an 80% discrete stenosis in the distal AV groove artery just before the takeoff of the left PDA.  The segment may be amenable to future balloon angioplasty of the patient has recurrent progressive symptoms.  · There is an intermediate stenosis of the proximal first obtuse marginal branch as well as severe distal segment disease.  · There is an 80% ostial right common iliac artery stenosis with a peak to peak pullback gradient from the distal aorta of 20 mmHg.    Assessment and Plan:   Varinder was seen today for congestive heart failure.    Diagnoses and all orders for this visit:    Coronary artery disease involving native coronary artery of native heart without angina pectoris  Mixed hyperlipidemia  -CCS 0  -Had myalgias with atorvastatin.  Tolerating Livalo  -Continue aspirin, statin, beta-blocker, amlodipine  -Repeat lipid  panel and LFTs     Chronic systolic heart failure (CMS/HCC)  Lower extremity swelling  -NYHA I, EF has improved back to 60%  -Continue current related medications    Carotid Bruits  -Mild right, moderate left stenosis per carotid duplex 6/2020  -Continue ASA, statin    CKD in setting of DKA  -Improved renal function      Possible claudication  -Improved after renal function improved. Has arthritic pains     Renal artery stenosis  -Has some medical renal disease and mild to moderate right renal artery atherosclerosis but no severe stenosis.  -Continue aspirin, statin    Essential hypertension  -Has been elevated recently.  -Increase hydralazine back up to 50 mg p.o. 3 times daily.  -Continue other current related medications.     Tobacco Dependence  -Did not tolerate Chantix.        - Return in about 6 months (around 5/30/2021) for Next scheduled follow up with EKG.      Scribed for Dr. De La Rosa by MING Prakash. 11/30/2020  11:55 EST    I, Sreekanth De La Rosa MD, personally performed the services as scribed by the above named individual. I have made any necessary edits and it is both accurate and complete.     Sreekanth De La Rosa MD, MSc, FACC, [facility]  Interventional Cardiology  [facility]

## 2021-01-01 RX ORDER — AMLODIPINE BESYLATE 10 MG/1
10 TABLET ORAL DAILY
Qty: 90 TABLET | Refills: 3 | Status: SHIPPED | OUTPATIENT
Start: 2021-01-01

## 2021-01-01 RX ORDER — ISOSORBIDE MONONITRATE 60 MG/1
60 TABLET, EXTENDED RELEASE ORAL
Qty: 90 TABLET | Refills: 3 | Status: SHIPPED | OUTPATIENT
Start: 2021-01-01

## 2021-01-01 RX ORDER — CARVEDILOL 12.5 MG/1
12.5 TABLET ORAL 2 TIMES DAILY WITH MEALS
Qty: 180 TABLET | Refills: 3 | Status: SHIPPED | OUTPATIENT
Start: 2021-01-01

## 2022-01-01 ENCOUNTER — APPOINTMENT (OUTPATIENT)
Dept: CARDIOLOGY | Facility: HOSPITAL | Age: 87
End: 2022-01-01

## 2022-01-01 ENCOUNTER — APPOINTMENT (OUTPATIENT)
Dept: CT IMAGING | Facility: HOSPITAL | Age: 87
End: 2022-01-01

## 2022-01-01 ENCOUNTER — APPOINTMENT (OUTPATIENT)
Dept: GENERAL RADIOLOGY | Facility: HOSPITAL | Age: 87
End: 2022-01-01

## 2022-01-01 ENCOUNTER — APPOINTMENT (OUTPATIENT)
Dept: NEUROLOGY | Facility: HOSPITAL | Age: 87
End: 2022-01-01

## 2022-01-01 ENCOUNTER — HOSPITAL ENCOUNTER (INPATIENT)
Facility: HOSPITAL | Age: 87
End: 2022-01-01
Attending: INTERNAL MEDICINE | Admitting: INTERNAL MEDICINE

## 2022-01-01 ENCOUNTER — OFFICE VISIT (OUTPATIENT)
Dept: CARDIOLOGY | Facility: CLINIC | Age: 87
End: 2022-01-01

## 2022-01-01 ENCOUNTER — HOSPITAL ENCOUNTER (INPATIENT)
Facility: HOSPITAL | Age: 87
LOS: 5 days | End: 2022-06-27
Attending: EMERGENCY MEDICINE | Admitting: INTERNAL MEDICINE

## 2022-01-01 VITALS
SYSTOLIC BLOOD PRESSURE: 170 MMHG | HEIGHT: 62 IN | BODY MASS INDEX: 25.58 KG/M2 | OXYGEN SATURATION: 97 % | WEIGHT: 139 LBS | DIASTOLIC BLOOD PRESSURE: 70 MMHG | HEART RATE: 75 BPM

## 2022-01-01 VITALS
HEIGHT: 62 IN | RESPIRATION RATE: 20 BRPM | BODY MASS INDEX: 27.38 KG/M2 | OXYGEN SATURATION: 86 % | HEART RATE: 106 BPM | SYSTOLIC BLOOD PRESSURE: 126 MMHG | DIASTOLIC BLOOD PRESSURE: 54 MMHG | WEIGHT: 148.8 LBS | TEMPERATURE: 99.7 F

## 2022-01-01 DIAGNOSIS — R77.8 ELEVATED TROPONIN: ICD-10-CM

## 2022-01-01 DIAGNOSIS — R53.1 GENERALIZED WEAKNESS: ICD-10-CM

## 2022-01-01 DIAGNOSIS — R73.9 HYPERGLYCEMIA: Primary | ICD-10-CM

## 2022-01-01 DIAGNOSIS — R41.0 CONFUSION: ICD-10-CM

## 2022-01-01 DIAGNOSIS — R94.31 T WAVE INVERSION IN EKG: ICD-10-CM

## 2022-01-01 DIAGNOSIS — E78.2 MIXED HYPERLIPIDEMIA: ICD-10-CM

## 2022-01-01 DIAGNOSIS — I73.9 CLAUDICATION: ICD-10-CM

## 2022-01-01 DIAGNOSIS — Z72.0 TOBACCO ABUSE: ICD-10-CM

## 2022-01-01 DIAGNOSIS — I25.10 CORONARY ARTERY DISEASE INVOLVING NATIVE CORONARY ARTERY OF NATIVE HEART WITHOUT ANGINA PECTORIS: Primary | ICD-10-CM

## 2022-01-01 DIAGNOSIS — I10 ESSENTIAL HYPERTENSION: ICD-10-CM

## 2022-01-01 DIAGNOSIS — I70.1 RENAL ARTERY STENOSIS: ICD-10-CM

## 2022-01-01 DIAGNOSIS — N18.9 CHRONIC KIDNEY DISEASE, UNSPECIFIED CKD STAGE: ICD-10-CM

## 2022-01-01 DIAGNOSIS — R55 NEAR SYNCOPE: ICD-10-CM

## 2022-01-01 LAB
ALBUMIN SERPL-MCNC: 3.1 G/DL (ref 3.5–5.2)
ALBUMIN SERPL-MCNC: 3.2 G/DL (ref 3.5–5.2)
ALBUMIN SERPL-MCNC: 3.6 G/DL (ref 3.5–5.2)
ALBUMIN SERPL-MCNC: 3.7 G/DL (ref 3.5–5.2)
ALBUMIN/GLOB SERPL: 0.8 G/DL
ALBUMIN/GLOB SERPL: 0.9 G/DL
ALBUMIN/GLOB SERPL: 0.9 G/DL
ALBUMIN/GLOB SERPL: 1 G/DL
ALP SERPL-CCNC: 103 U/L (ref 39–117)
ALP SERPL-CCNC: 108 U/L (ref 39–117)
ALP SERPL-CCNC: 116 U/L (ref 39–117)
ALP SERPL-CCNC: 130 U/L (ref 39–117)
ALT SERPL W P-5'-P-CCNC: 5 U/L (ref 1–33)
ALT SERPL W P-5'-P-CCNC: 7 U/L (ref 1–33)
ALT SERPL W P-5'-P-CCNC: 7 U/L (ref 1–33)
ALT SERPL W P-5'-P-CCNC: 8 U/L (ref 1–33)
AMPHET+METHAMPHET UR QL: NEGATIVE
AMPHETAMINES UR QL: NEGATIVE
ANION GAP SERPL CALCULATED.3IONS-SCNC: 12 MMOL/L (ref 5–15)
ANION GAP SERPL CALCULATED.3IONS-SCNC: 15 MMOL/L (ref 5–15)
ANION GAP SERPL CALCULATED.3IONS-SCNC: 15 MMOL/L (ref 5–15)
ANION GAP SERPL CALCULATED.3IONS-SCNC: 16 MMOL/L (ref 5–15)
APAP SERPL-MCNC: 13.5 MCG/ML (ref 0–30)
APTT PPP: 27.2 SECONDS (ref 22–39)
ARTERIAL PATENCY WRIST A: ABNORMAL
AST SERPL-CCNC: 13 U/L (ref 1–32)
AST SERPL-CCNC: 15 U/L (ref 1–32)
AST SERPL-CCNC: 21 U/L (ref 1–32)
AST SERPL-CCNC: 23 U/L (ref 1–32)
ATMOSPHERIC PRESS: ABNORMAL MM[HG]
ATMOSPHERIC PRESS: ABNORMAL MM[HG]
BACTERIA SPEC AEROBE CULT: NORMAL
BACTERIA SPEC AEROBE CULT: NORMAL
BARBITURATES UR QL SCN: NEGATIVE
BASE EXCESS BLDA CALC-SCNC: -12.2 MMOL/L (ref 0–2)
BASE EXCESS BLDV CALC-SCNC: -3.1 MMOL/L (ref -2–2)
BASOPHILS # BLD AUTO: 0.02 10*3/MM3 (ref 0–0.2)
BASOPHILS # BLD AUTO: 0.03 10*3/MM3 (ref 0–0.2)
BASOPHILS # BLD AUTO: 0.04 10*3/MM3 (ref 0–0.2)
BASOPHILS # BLD AUTO: 0.05 10*3/MM3 (ref 0–0.2)
BASOPHILS NFR BLD AUTO: 0.2 % (ref 0–1.5)
BASOPHILS NFR BLD AUTO: 0.2 % (ref 0–1.5)
BASOPHILS NFR BLD AUTO: 0.3 % (ref 0–1.5)
BASOPHILS NFR BLD AUTO: 0.3 % (ref 0–1.5)
BDY SITE: ABNORMAL
BDY SITE: ABNORMAL
BENZODIAZ UR QL SCN: NEGATIVE
BH CV ECHO MEAS - AO MAX PG: 9.9 MMHG
BH CV ECHO MEAS - AO MEAN PG: 6 MMHG
BH CV ECHO MEAS - AO ROOT DIAM: 2.8 CM
BH CV ECHO MEAS - AO V2 MAX: 157 CM/SEC
BH CV ECHO MEAS - AO V2 VTI: 31.8 CM
BH CV ECHO MEAS - AVA(I,D): 2.2 CM2
BH CV ECHO MEAS - EDV(CUBED): 29.8 ML
BH CV ECHO MEAS - EDV(MOD-SP2): 41.3 ML
BH CV ECHO MEAS - EDV(MOD-SP4): 57.9 ML
BH CV ECHO MEAS - EF(MOD-BP): 58.9 %
BH CV ECHO MEAS - EF(MOD-SP2): 58.4 %
BH CV ECHO MEAS - EF(MOD-SP4): 57.9 %
BH CV ECHO MEAS - ESV(CUBED): 8 ML
BH CV ECHO MEAS - ESV(MOD-SP2): 17.2 ML
BH CV ECHO MEAS - ESV(MOD-SP4): 24.4 ML
BH CV ECHO MEAS - FS: 35.5 %
BH CV ECHO MEAS - IVS/LVPW: 1.1 CM
BH CV ECHO MEAS - IVSD: 1.1 CM
BH CV ECHO MEAS - LA DIMENSION: 3.5 CM
BH CV ECHO MEAS - LAT PEAK E' VEL: 6.3 CM/SEC
BH CV ECHO MEAS - LV MASS(C)D: 92.8 GRAMS
BH CV ECHO MEAS - LV MAX PG: 5.1 MMHG
BH CV ECHO MEAS - LV MEAN PG: 3 MMHG
BH CV ECHO MEAS - LV V1 MAX: 113 CM/SEC
BH CV ECHO MEAS - LV V1 VTI: 24.7 CM
BH CV ECHO MEAS - LVIDD: 3.1 CM
BH CV ECHO MEAS - LVIDS: 2 CM
BH CV ECHO MEAS - LVOT AREA: 2.8 CM2
BH CV ECHO MEAS - LVOT DIAM: 1.9 CM
BH CV ECHO MEAS - LVPWD: 1 CM
BH CV ECHO MEAS - MED PEAK E' VEL: 4.6 CM/SEC
BH CV ECHO MEAS - MV A MAX VEL: 138 CM/SEC
BH CV ECHO MEAS - MV DEC SLOPE: 285 CM/SEC2
BH CV ECHO MEAS - MV DEC TIME: 0.25 MSEC
BH CV ECHO MEAS - MV E MAX VEL: 85.4 CM/SEC
BH CV ECHO MEAS - MV E/A: 0.62
BH CV ECHO MEAS - MV MAX PG: 8.9 MMHG
BH CV ECHO MEAS - MV MEAN PG: 2 MMHG
BH CV ECHO MEAS - MV P1/2T: 103.8 MSEC
BH CV ECHO MEAS - MV V2 VTI: 33.6 CM
BH CV ECHO MEAS - MVA(P1/2T): 2.12 CM2
BH CV ECHO MEAS - MVA(VTI): 2.08 CM2
BH CV ECHO MEAS - SV(LVOT): 70 ML
BH CV ECHO MEAS - SV(MOD-SP2): 24.1 ML
BH CV ECHO MEAS - SV(MOD-SP4): 33.5 ML
BH CV ECHO MEAS - TAPSE (>1.6): 2.04 CM
BH CV ECHO MEASUREMENTS AVERAGE E/E' RATIO: 15.67
BH CV VAS BP RIGHT ARM: NORMAL MMHG
BH CV XLRA - RV BASE: 3 CM
BH CV XLRA - RV LENGTH: 7.1 CM
BH CV XLRA - RV MID: 2.1 CM
BH CV XLRA - TDI S': 10 CM/SEC
BH CV XLRA MEAS LEFT DIST CCA EDV: 13 CM/SEC
BH CV XLRA MEAS LEFT DIST CCA PSV: 118 CM/SEC
BH CV XLRA MEAS LEFT DIST ICA EDV: 12 CM/SEC
BH CV XLRA MEAS LEFT DIST ICA PSV: 69 CM/SEC
BH CV XLRA MEAS LEFT ICA/CCA RATIO: 4
BH CV XLRA MEAS LEFT MID CCA EDV: 13 CM/SEC
BH CV XLRA MEAS LEFT MID CCA PSV: 103 CM/SEC
BH CV XLRA MEAS LEFT MID ICA EDV: 17 CM/SEC
BH CV XLRA MEAS LEFT MID ICA PSV: 76 CM/SEC
BH CV XLRA MEAS LEFT PROX CCA EDV: 12 CM/SEC
BH CV XLRA MEAS LEFT PROX CCA PSV: 121 CM/SEC
BH CV XLRA MEAS LEFT PROX ECA PSV: 176 CM/SEC
BH CV XLRA MEAS LEFT PROX ICA EDV: 20 CM/SEC
BH CV XLRA MEAS LEFT PROX ICA PSV: 412 CM/SEC
BH CV XLRA MEAS LEFT PROX SCLA PSV: 301 CM/SEC
BH CV XLRA MEAS LEFT VERTEBRAL A EDV: 6 CM/SEC
BH CV XLRA MEAS LEFT VERTEBRAL A PSV: 19 CM/SEC
BH CV XLRA MEAS RIGHT DIST CCA EDV: 9 CM/SEC
BH CV XLRA MEAS RIGHT DIST CCA PSV: 76 CM/SEC
BH CV XLRA MEAS RIGHT DIST ICA EDV: 13 CM/SEC
BH CV XLRA MEAS RIGHT DIST ICA PSV: 55 CM/SEC
BH CV XLRA MEAS RIGHT ICA/CCA RATIO: 2.01
BH CV XLRA MEAS RIGHT MID CCA EDV: 9 CM/SEC
BH CV XLRA MEAS RIGHT MID CCA PSV: 73 CM/SEC
BH CV XLRA MEAS RIGHT MID ICA EDV: 11 CM/SEC
BH CV XLRA MEAS RIGHT MID ICA PSV: 57 CM/SEC
BH CV XLRA MEAS RIGHT PROX CCA EDV: 9 CM/SEC
BH CV XLRA MEAS RIGHT PROX CCA PSV: 72 CM/SEC
BH CV XLRA MEAS RIGHT PROX ECA PSV: 282 CM/SEC
BH CV XLRA MEAS RIGHT PROX ICA EDV: 26 CM/SEC
BH CV XLRA MEAS RIGHT PROX ICA PSV: 147 CM/SEC
BH CV XLRA MEAS RIGHT PROX SCLA PSV: 211 CM/SEC
BH CV XLRA MEAS RIGHT VERTEBRAL A PSV: 14 CM/SEC
BILIRUB SERPL-MCNC: 0.2 MG/DL (ref 0–1.2)
BILIRUB SERPL-MCNC: 0.3 MG/DL (ref 0–1.2)
BILIRUB UR QL STRIP: NEGATIVE
BODY TEMPERATURE: 37 C
BODY TEMPERATURE: 37 C
BUN SERPL-MCNC: 28 MG/DL (ref 8–23)
BUN SERPL-MCNC: 33 MG/DL (ref 8–23)
BUN SERPL-MCNC: 38 MG/DL (ref 8–23)
BUN SERPL-MCNC: 41 MG/DL (ref 8–23)
BUN/CREAT SERPL: 12.5 (ref 7–25)
BUN/CREAT SERPL: 18.5 (ref 7–25)
BUN/CREAT SERPL: 7.6 (ref 7–25)
BUN/CREAT SERPL: 8.1 (ref 7–25)
BUPRENORPHINE SERPL-MCNC: NEGATIVE NG/ML
CALCIUM SPEC-SCNC: 7.9 MG/DL (ref 8.2–9.6)
CALCIUM SPEC-SCNC: 8.2 MG/DL (ref 8.2–9.6)
CALCIUM SPEC-SCNC: 8.4 MG/DL (ref 8.2–9.6)
CALCIUM SPEC-SCNC: 8.9 MG/DL (ref 8.2–9.6)
CANNABINOIDS SERPL QL: NEGATIVE
CHLORIDE SERPL-SCNC: 103 MMOL/L (ref 98–107)
CHLORIDE SERPL-SCNC: 98 MMOL/L (ref 98–107)
CHLORIDE SERPL-SCNC: 99 MMOL/L (ref 98–107)
CHLORIDE SERPL-SCNC: 99 MMOL/L (ref 98–107)
CHOLEST SERPL-MCNC: 273 MG/DL (ref 0–200)
CK MB SERPL-CCNC: 7.4 NG/ML
CK SERPL-CCNC: 94 U/L (ref 20–180)
CK SERPL-CCNC: 98 U/L (ref 20–180)
CLARITY UR: CLEAR
CO2 BLDA-SCNC: 14.6 MMOL/L (ref 22–33)
CO2 BLDA-SCNC: 25.5 MMOL/L (ref 22–33)
CO2 SERPL-SCNC: 14 MMOL/L (ref 22–29)
CO2 SERPL-SCNC: 15 MMOL/L (ref 22–29)
CO2 SERPL-SCNC: 20 MMOL/L (ref 22–29)
CO2 SERPL-SCNC: 21 MMOL/L (ref 22–29)
COCAINE UR QL: NEGATIVE
COHGB MFR BLD: 0.8 % (ref 0–2)
COHGB MFR BLD: 1.3 %
COLOR UR: YELLOW
CREAT SERPL-MCNC: 1.51 MG/DL (ref 0.57–1)
CREAT SERPL-MCNC: 2.64 MG/DL (ref 0.57–1)
CREAT SERPL-MCNC: 4.67 MG/DL (ref 0.57–1)
CREAT SERPL-MCNC: 5.4 MG/DL (ref 0.57–1)
D-LACTATE SERPL-SCNC: 1.9 MMOL/L (ref 0.5–2)
DEPRECATED RDW RBC AUTO: 41.1 FL (ref 37–54)
DEPRECATED RDW RBC AUTO: 42.1 FL (ref 37–54)
DEPRECATED RDW RBC AUTO: 46.9 FL (ref 37–54)
DEPRECATED RDW RBC AUTO: 48.9 FL (ref 37–54)
EGFRCR SERPLBLD CKD-EPI 2021: 16.7 ML/MIN/1.73
EGFRCR SERPLBLD CKD-EPI 2021: 32.7 ML/MIN/1.73
EGFRCR SERPLBLD CKD-EPI 2021: 7.1 ML/MIN/1.73
EGFRCR SERPLBLD CKD-EPI 2021: 8.4 ML/MIN/1.73
EOSINOPHIL # BLD AUTO: 0.01 10*3/MM3 (ref 0–0.4)
EOSINOPHIL # BLD AUTO: 0.01 10*3/MM3 (ref 0–0.4)
EOSINOPHIL # BLD AUTO: 0.06 10*3/MM3 (ref 0–0.4)
EOSINOPHIL # BLD AUTO: 0.13 10*3/MM3 (ref 0–0.4)
EOSINOPHIL NFR BLD AUTO: 0 % (ref 0.3–6.2)
EOSINOPHIL NFR BLD AUTO: 0.1 % (ref 0.3–6.2)
EOSINOPHIL NFR BLD AUTO: 0.5 % (ref 0.3–6.2)
EOSINOPHIL NFR BLD AUTO: 1.8 % (ref 0.3–6.2)
EPAP: 0
EPAP: 0
ERYTHROCYTE [DISTWIDTH] IN BLOOD BY AUTOMATED COUNT: 13.2 % (ref 12.3–15.4)
ERYTHROCYTE [DISTWIDTH] IN BLOOD BY AUTOMATED COUNT: 13.5 % (ref 12.3–15.4)
ERYTHROCYTE [DISTWIDTH] IN BLOOD BY AUTOMATED COUNT: 14.6 % (ref 12.3–15.4)
ERYTHROCYTE [DISTWIDTH] IN BLOOD BY AUTOMATED COUNT: 14.6 % (ref 12.3–15.4)
ETHANOL BLD-MCNC: <10 MG/DL (ref 0–10)
FLUAV SUBTYP SPEC NAA+PROBE: NOT DETECTED
FLUBV RNA ISLT QL NAA+PROBE: NOT DETECTED
GLOBULIN UR ELPH-MCNC: 3.5 GM/DL
GLOBULIN UR ELPH-MCNC: 3.5 GM/DL
GLOBULIN UR ELPH-MCNC: 3.9 GM/DL
GLOBULIN UR ELPH-MCNC: 3.9 GM/DL
GLUCOSE BLDC GLUCOMTR-MCNC: 148 MG/DL (ref 70–130)
GLUCOSE BLDC GLUCOMTR-MCNC: 198 MG/DL (ref 70–130)
GLUCOSE BLDC GLUCOMTR-MCNC: 209 MG/DL (ref 70–130)
GLUCOSE BLDC GLUCOMTR-MCNC: 220 MG/DL (ref 70–130)
GLUCOSE BLDC GLUCOMTR-MCNC: 244 MG/DL (ref 70–130)
GLUCOSE BLDC GLUCOMTR-MCNC: 247 MG/DL (ref 70–130)
GLUCOSE BLDC GLUCOMTR-MCNC: 249 MG/DL (ref 70–130)
GLUCOSE BLDC GLUCOMTR-MCNC: 254 MG/DL (ref 70–130)
GLUCOSE BLDC GLUCOMTR-MCNC: 266 MG/DL (ref 70–130)
GLUCOSE BLDC GLUCOMTR-MCNC: 278 MG/DL (ref 70–130)
GLUCOSE BLDC GLUCOMTR-MCNC: 279 MG/DL (ref 70–130)
GLUCOSE BLDC GLUCOMTR-MCNC: 316 MG/DL (ref 70–130)
GLUCOSE BLDC GLUCOMTR-MCNC: 338 MG/DL (ref 70–130)
GLUCOSE BLDC GLUCOMTR-MCNC: 348 MG/DL (ref 70–130)
GLUCOSE BLDC GLUCOMTR-MCNC: 435 MG/DL (ref 70–130)
GLUCOSE BLDC GLUCOMTR-MCNC: 573 MG/DL (ref 70–130)
GLUCOSE SERPL-MCNC: 246 MG/DL (ref 65–99)
GLUCOSE SERPL-MCNC: 247 MG/DL (ref 65–99)
GLUCOSE SERPL-MCNC: 404 MG/DL (ref 65–99)
GLUCOSE SERPL-MCNC: 526 MG/DL (ref 65–99)
GLUCOSE UR STRIP-MCNC: ABNORMAL MG/DL
HBA1C MFR BLD: 13 % (ref 4.8–5.6)
HCO3 BLDA-SCNC: 13.7 MMOL/L (ref 20–26)
HCO3 BLDV-SCNC: 24 MMOL/L (ref 22–28)
HCT VFR BLD AUTO: 37.1 % (ref 34–46.6)
HCT VFR BLD AUTO: 41.1 % (ref 34–46.6)
HCT VFR BLD AUTO: 41.5 % (ref 34–46.6)
HCT VFR BLD AUTO: 42.7 % (ref 34–46.6)
HCT VFR BLD CALC: 39.1 % (ref 38–51)
HDLC SERPL-MCNC: 49 MG/DL (ref 40–60)
HGB BLD-MCNC: 12.2 G/DL (ref 12–15.9)
HGB BLD-MCNC: 13 G/DL (ref 12–15.9)
HGB BLD-MCNC: 14.1 G/DL (ref 12–15.9)
HGB BLD-MCNC: 14.2 G/DL (ref 12–15.9)
HGB BLDA-MCNC: 12.7 G/DL (ref 14–18)
HGB BLDA-MCNC: 16 G/DL (ref 14–18)
HGB UR QL STRIP.AUTO: NEGATIVE
HOLD SPECIMEN: NORMAL
IMM GRANULOCYTES # BLD AUTO: 0.04 10*3/MM3 (ref 0–0.05)
IMM GRANULOCYTES # BLD AUTO: 0.04 10*3/MM3 (ref 0–0.05)
IMM GRANULOCYTES # BLD AUTO: 0.08 10*3/MM3 (ref 0–0.05)
IMM GRANULOCYTES # BLD AUTO: 0.16 10*3/MM3 (ref 0–0.05)
IMM GRANULOCYTES NFR BLD AUTO: 0.3 % (ref 0–0.5)
IMM GRANULOCYTES NFR BLD AUTO: 0.4 % (ref 0–0.5)
IMM GRANULOCYTES NFR BLD AUTO: 0.5 % (ref 0–0.5)
IMM GRANULOCYTES NFR BLD AUTO: 0.7 % (ref 0–0.5)
INHALED O2 CONCENTRATION: 21 %
INHALED O2 CONCENTRATION: 80 %
IPAP: 0
IPAP: 0
KETONES UR QL STRIP: NEGATIVE
LDLC SERPL CALC-MCNC: 189 MG/DL (ref 0–100)
LDLC/HDLC SERPL: 3.82 {RATIO}
LEFT ATRIUM VOLUME INDEX: 18.2 ML/M2
LEUKOCYTE ESTERASE UR QL STRIP.AUTO: NEGATIVE
LYMPHOCYTES # BLD AUTO: 1.31 10*3/MM3 (ref 0.7–3.1)
LYMPHOCYTES # BLD AUTO: 1.68 10*3/MM3 (ref 0.7–3.1)
LYMPHOCYTES # BLD AUTO: 1.88 10*3/MM3 (ref 0.7–3.1)
LYMPHOCYTES # BLD AUTO: 2.02 10*3/MM3 (ref 0.7–3.1)
LYMPHOCYTES NFR BLD AUTO: 10.3 % (ref 19.6–45.3)
LYMPHOCYTES NFR BLD AUTO: 16.3 % (ref 19.6–45.3)
LYMPHOCYTES NFR BLD AUTO: 23 % (ref 19.6–45.3)
LYMPHOCYTES NFR BLD AUTO: 5.8 % (ref 19.6–45.3)
MAGNESIUM SERPL-MCNC: 1.9 MG/DL (ref 1.6–2.4)
MAGNESIUM SERPL-MCNC: 2.2 MG/DL (ref 1.6–2.4)
MAGNESIUM SERPL-MCNC: 2.3 MG/DL (ref 1.6–2.4)
MAGNESIUM SERPL-MCNC: 2.3 MG/DL (ref 1.6–2.4)
MAXIMAL PREDICTED HEART RATE: 130 BPM
MAXIMAL PREDICTED HEART RATE: 130 BPM
MCH RBC QN AUTO: 28.8 PG (ref 26.6–33)
MCH RBC QN AUTO: 29 PG (ref 26.6–33)
MCHC RBC AUTO-ENTMCNC: 31.3 G/DL (ref 31.5–35.7)
MCHC RBC AUTO-ENTMCNC: 32.9 G/DL (ref 31.5–35.7)
MCHC RBC AUTO-ENTMCNC: 33.3 G/DL (ref 31.5–35.7)
MCHC RBC AUTO-ENTMCNC: 34.3 G/DL (ref 31.5–35.7)
MCV RBC AUTO: 83.9 FL (ref 79–97)
MCV RBC AUTO: 87.3 FL (ref 79–97)
MCV RBC AUTO: 88.1 FL (ref 79–97)
MCV RBC AUTO: 92.6 FL (ref 79–97)
METHADONE UR QL SCN: NEGATIVE
METHGB BLD QL: 0.2 %
METHGB BLD QL: 0.4 % (ref 0–1.5)
MODALITY: ABNORMAL
MODALITY: ABNORMAL
MONOCYTES # BLD AUTO: 0.44 10*3/MM3 (ref 0.1–0.9)
MONOCYTES # BLD AUTO: 0.58 10*3/MM3 (ref 0.1–0.9)
MONOCYTES # BLD AUTO: 1.02 10*3/MM3 (ref 0.1–0.9)
MONOCYTES # BLD AUTO: 1.13 10*3/MM3 (ref 0.1–0.9)
MONOCYTES NFR BLD AUTO: 4.7 % (ref 5–12)
MONOCYTES NFR BLD AUTO: 5 % (ref 5–12)
MONOCYTES NFR BLD AUTO: 5.6 % (ref 5–12)
MONOCYTES NFR BLD AUTO: 6 % (ref 5–12)
NEUTROPHILS NFR BLD AUTO: 15.13 10*3/MM3 (ref 1.7–7)
NEUTROPHILS NFR BLD AUTO: 19.92 10*3/MM3 (ref 1.7–7)
NEUTROPHILS NFR BLD AUTO: 5.01 10*3/MM3 (ref 1.7–7)
NEUTROPHILS NFR BLD AUTO: 68.4 % (ref 42.7–76)
NEUTROPHILS NFR BLD AUTO: 78 % (ref 42.7–76)
NEUTROPHILS NFR BLD AUTO: 83.3 % (ref 42.7–76)
NEUTROPHILS NFR BLD AUTO: 88.3 % (ref 42.7–76)
NEUTROPHILS NFR BLD AUTO: 9.65 10*3/MM3 (ref 1.7–7)
NITRITE UR QL STRIP: NEGATIVE
NOTE: ABNORMAL
NOTE: ABNORMAL
NRBC BLD AUTO-RTO: 0 /100 WBC (ref 0–0.2)
NRBC BLD AUTO-RTO: 0.1 /100 WBC (ref 0–0.2)
NT-PROBNP SERPL-MCNC: 4935 PG/ML (ref 0–1800)
NT-PROBNP SERPL-MCNC: ABNORMAL PG/ML (ref 0–1800)
OPIATES UR QL: NEGATIVE
OXYCODONE UR QL SCN: NEGATIVE
OXYHGB MFR BLDV: 43.7 %
OXYHGB MFR BLDV: 95.2 % (ref 94–99)
PAW @ PEAK INSP FLOW SETTING VENT: 0 CMH2O
PAW @ PEAK INSP FLOW SETTING VENT: 0 CMH2O
PCO2 BLDA: 30.9 MM HG (ref 35–45)
PCO2 BLDV: 49.5 MM HG (ref 41–51)
PCO2 TEMP ADJ BLD: 30.9 MM HG (ref 35–45)
PCP UR QL SCN: NEGATIVE
PH BLDA: 7.25 PH UNITS (ref 7.35–7.45)
PH BLDV: 7.29 PH UNITS (ref 7.31–7.41)
PH UR STRIP.AUTO: <=5 [PH] (ref 5–8)
PH, TEMP CORRECTED: 7.25 PH UNITS
PLATELET # BLD AUTO: 236 10*3/MM3 (ref 140–450)
PLATELET # BLD AUTO: 275 10*3/MM3 (ref 140–450)
PLATELET # BLD AUTO: 293 10*3/MM3 (ref 140–450)
PLATELET # BLD AUTO: 318 10*3/MM3 (ref 140–450)
PMV BLD AUTO: 11 FL (ref 6–12)
PMV BLD AUTO: 11.2 FL (ref 6–12)
PMV BLD AUTO: 11.5 FL (ref 6–12)
PMV BLD AUTO: 11.7 FL (ref 6–12)
PO2 BLDA: 87.3 MM HG (ref 83–108)
PO2 BLDV: 26.6 MM HG (ref 27–53)
PO2 TEMP ADJ BLD: 87.3 MM HG (ref 83–108)
POTASSIUM SERPL-SCNC: 3.9 MMOL/L (ref 3.5–5.2)
POTASSIUM SERPL-SCNC: 4.1 MMOL/L (ref 3.5–5.2)
PROCALCITONIN SERPL-MCNC: 0.07 NG/ML (ref 0–0.25)
PROPOXYPH UR QL: NEGATIVE
PROT SERPL-MCNC: 6.6 G/DL (ref 6–8.5)
PROT SERPL-MCNC: 7.1 G/DL (ref 6–8.5)
PROT SERPL-MCNC: 7.1 G/DL (ref 6–8.5)
PROT SERPL-MCNC: 7.6 G/DL (ref 6–8.5)
PROT UR QL STRIP: NEGATIVE
QT INTERVAL: 366 MS
QT INTERVAL: 502 MS
QT INTERVAL: 510 MS
QTC INTERVAL: 437 MS
QTC INTERVAL: 487 MS
QTC INTERVAL: 496 MS
RBC # BLD AUTO: 4.21 10*6/MM3 (ref 3.77–5.28)
RBC # BLD AUTO: 4.48 10*6/MM3 (ref 3.77–5.28)
RBC # BLD AUTO: 4.89 10*6/MM3 (ref 3.77–5.28)
RBC # BLD AUTO: 4.9 10*6/MM3 (ref 3.77–5.28)
SALICYLATES SERPL-MCNC: <0.3 MG/DL
SARS-COV-2 RNA PNL SPEC NAA+PROBE: NOT DETECTED
SODIUM SERPL-SCNC: 129 MMOL/L (ref 136–145)
SODIUM SERPL-SCNC: 131 MMOL/L (ref 136–145)
SODIUM SERPL-SCNC: 133 MMOL/L (ref 136–145)
SODIUM SERPL-SCNC: 134 MMOL/L (ref 136–145)
SP GR UR STRIP: >=1.03 (ref 1–1.03)
STRESS TARGET HR: 111 BPM
STRESS TARGET HR: 111 BPM
T4 FREE SERPL-MCNC: 1.14 NG/DL (ref 0.93–1.7)
TOTAL RATE: 0 BREATHS/MINUTE
TOTAL RATE: 0 BREATHS/MINUTE
TRICYCLICS UR QL SCN: NEGATIVE
TRIGL SERPL-MCNC: 184 MG/DL (ref 0–150)
TROPONIN T SERPL-MCNC: 0.13 NG/ML (ref 0–0.03)
TROPONIN T SERPL-MCNC: 0.16 NG/ML (ref 0–0.03)
TROPONIN T SERPL-MCNC: 0.17 NG/ML (ref 0–0.03)
TROPONIN T SERPL-MCNC: 0.19 NG/ML (ref 0–0.03)
TROPONIN T SERPL-MCNC: 0.38 NG/ML (ref 0–0.03)
TROPONIN T SERPL-MCNC: 0.39 NG/ML (ref 0–0.03)
TSH SERPL DL<=0.05 MIU/L-ACNC: 7.93 UIU/ML (ref 0.27–4.2)
UROBILINOGEN UR QL STRIP: ABNORMAL
VLDLC SERPL-MCNC: 35 MG/DL (ref 5–40)
WBC NRBC COR # BLD: 12.38 10*3/MM3 (ref 3.4–10.8)
WBC NRBC COR # BLD: 18.17 10*3/MM3 (ref 3.4–10.8)
WBC NRBC COR # BLD: 22.57 10*3/MM3 (ref 3.4–10.8)
WBC NRBC COR # BLD: 7.32 10*3/MM3 (ref 3.4–10.8)
WHOLE BLOOD HOLD COAG: NORMAL
WHOLE BLOOD HOLD SPECIMEN: NORMAL

## 2022-01-01 PROCEDURE — 80306 DRUG TEST PRSMV INSTRMNT: CPT | Performed by: EMERGENCY MEDICINE

## 2022-01-01 PROCEDURE — 83880 ASSAY OF NATRIURETIC PEPTIDE: CPT | Performed by: NURSE PRACTITIONER

## 2022-01-01 PROCEDURE — 99223 1ST HOSP IP/OBS HIGH 75: CPT | Performed by: INTERNAL MEDICINE

## 2022-01-01 PROCEDURE — 63710000001 INSULIN LISPRO (HUMAN) PER 5 UNITS: Performed by: INTERNAL MEDICINE

## 2022-01-01 PROCEDURE — 85730 THROMBOPLASTIN TIME PARTIAL: CPT | Performed by: EMERGENCY MEDICINE

## 2022-01-01 PROCEDURE — 93005 ELECTROCARDIOGRAM TRACING: CPT | Performed by: INTERNAL MEDICINE

## 2022-01-01 PROCEDURE — 94799 UNLISTED PULMONARY SVC/PX: CPT

## 2022-01-01 PROCEDURE — 84484 ASSAY OF TROPONIN QUANT: CPT | Performed by: EMERGENCY MEDICINE

## 2022-01-01 PROCEDURE — 82962 GLUCOSE BLOOD TEST: CPT

## 2022-01-01 PROCEDURE — 63710000001 INSULIN DETEMIR PER 5 UNITS: Performed by: INTERNAL MEDICINE

## 2022-01-01 PROCEDURE — 83050 HGB METHEMOGLOBIN QUAN: CPT

## 2022-01-01 PROCEDURE — 93005 ELECTROCARDIOGRAM TRACING: CPT | Performed by: EMERGENCY MEDICINE

## 2022-01-01 PROCEDURE — 85025 COMPLETE CBC W/AUTO DIFF WBC: CPT | Performed by: INTERNAL MEDICINE

## 2022-01-01 PROCEDURE — 94664 DEMO&/EVAL PT USE INHALER: CPT

## 2022-01-01 PROCEDURE — 99285 EMERGENCY DEPT VISIT HI MDM: CPT

## 2022-01-01 PROCEDURE — 99221 1ST HOSP IP/OBS SF/LOW 40: CPT | Performed by: STUDENT IN AN ORGANIZED HEALTH CARE EDUCATION/TRAINING PROGRAM

## 2022-01-01 PROCEDURE — 99232 SBSQ HOSP IP/OBS MODERATE 35: CPT | Performed by: INTERNAL MEDICINE

## 2022-01-01 PROCEDURE — 84439 ASSAY OF FREE THYROXINE: CPT | Performed by: EMERGENCY MEDICINE

## 2022-01-01 PROCEDURE — 74176 CT ABD & PELVIS W/O CONTRAST: CPT

## 2022-01-01 PROCEDURE — 80179 DRUG ASSAY SALICYLATE: CPT | Performed by: EMERGENCY MEDICINE

## 2022-01-01 PROCEDURE — 83605 ASSAY OF LACTIC ACID: CPT | Performed by: EMERGENCY MEDICINE

## 2022-01-01 PROCEDURE — 93306 TTE W/DOPPLER COMPLETE: CPT | Performed by: INTERNAL MEDICINE

## 2022-01-01 PROCEDURE — 63710000001 INSULIN REGULAR HUMAN PER 5 UNITS: Performed by: EMERGENCY MEDICINE

## 2022-01-01 PROCEDURE — 71045 X-RAY EXAM CHEST 1 VIEW: CPT

## 2022-01-01 PROCEDURE — 94762 N-INVAS EAR/PLS OXIMTRY CONT: CPT

## 2022-01-01 PROCEDURE — 85025 COMPLETE CBC W/AUTO DIFF WBC: CPT | Performed by: NURSE PRACTITIONER

## 2022-01-01 PROCEDURE — 80053 COMPREHEN METABOLIC PANEL: CPT | Performed by: INTERNAL MEDICINE

## 2022-01-01 PROCEDURE — 99214 OFFICE O/P EST MOD 30 MIN: CPT | Performed by: INTERNAL MEDICINE

## 2022-01-01 PROCEDURE — 85025 COMPLETE CBC W/AUTO DIFF WBC: CPT | Performed by: EMERGENCY MEDICINE

## 2022-01-01 PROCEDURE — 25010000002 LORAZEPAM PER 2 MG: Performed by: INTERNAL MEDICINE

## 2022-01-01 PROCEDURE — 84484 ASSAY OF TROPONIN QUANT: CPT | Performed by: NURSE PRACTITIONER

## 2022-01-01 PROCEDURE — 80061 LIPID PANEL: CPT | Performed by: INTERNAL MEDICINE

## 2022-01-01 PROCEDURE — 82805 BLOOD GASES W/O2 SATURATION: CPT

## 2022-01-01 PROCEDURE — 82077 ASSAY SPEC XCP UR&BREATH IA: CPT | Performed by: EMERGENCY MEDICINE

## 2022-01-01 PROCEDURE — 94660 CPAP INITIATION&MGMT: CPT

## 2022-01-01 PROCEDURE — 83735 ASSAY OF MAGNESIUM: CPT | Performed by: INTERNAL MEDICINE

## 2022-01-01 PROCEDURE — 99223 1ST HOSP IP/OBS HIGH 75: CPT | Performed by: PSYCHIATRY & NEUROLOGY

## 2022-01-01 PROCEDURE — 84443 ASSAY THYROID STIM HORMONE: CPT | Performed by: EMERGENCY MEDICINE

## 2022-01-01 PROCEDURE — 25010000002 FUROSEMIDE PER 20 MG: Performed by: NURSE PRACTITIONER

## 2022-01-01 PROCEDURE — 99222 1ST HOSP IP/OBS MODERATE 55: CPT | Performed by: INTERNAL MEDICINE

## 2022-01-01 PROCEDURE — 25010000002 MORPHINE PER 10 MG: Performed by: INTERNAL MEDICINE

## 2022-01-01 PROCEDURE — 82553 CREATINE MB FRACTION: CPT | Performed by: INTERNAL MEDICINE

## 2022-01-01 PROCEDURE — 87040 BLOOD CULTURE FOR BACTERIA: CPT | Performed by: EMERGENCY MEDICINE

## 2022-01-01 PROCEDURE — 63710000001 DIPHENHYDRAMINE PER 50 MG: Performed by: EMERGENCY MEDICINE

## 2022-01-01 PROCEDURE — 99232 SBSQ HOSP IP/OBS MODERATE 35: CPT | Performed by: PSYCHIATRY & NEUROLOGY

## 2022-01-01 PROCEDURE — 84484 ASSAY OF TROPONIN QUANT: CPT | Performed by: INTERNAL MEDICINE

## 2022-01-01 PROCEDURE — 99231 SBSQ HOSP IP/OBS SF/LOW 25: CPT | Performed by: INTERNAL MEDICINE

## 2022-01-01 PROCEDURE — 82375 ASSAY CARBOXYHB QUANT: CPT

## 2022-01-01 PROCEDURE — 84484 ASSAY OF TROPONIN QUANT: CPT | Performed by: HOSPITALIST

## 2022-01-01 PROCEDURE — 97161 PT EVAL LOW COMPLEX 20 MIN: CPT

## 2022-01-01 PROCEDURE — 93010 ELECTROCARDIOGRAM REPORT: CPT | Performed by: INTERNAL MEDICINE

## 2022-01-01 PROCEDURE — 95816 EEG AWAKE AND DROWSY: CPT

## 2022-01-01 PROCEDURE — 36600 WITHDRAWAL OF ARTERIAL BLOOD: CPT

## 2022-01-01 PROCEDURE — 93306 TTE W/DOPPLER COMPLETE: CPT

## 2022-01-01 PROCEDURE — 93880 EXTRACRANIAL BILAT STUDY: CPT | Performed by: INTERNAL MEDICINE

## 2022-01-01 PROCEDURE — 83880 ASSAY OF NATRIURETIC PEPTIDE: CPT | Performed by: EMERGENCY MEDICINE

## 2022-01-01 PROCEDURE — 25010000002 ONDANSETRON PER 1 MG: Performed by: EMERGENCY MEDICINE

## 2022-01-01 PROCEDURE — P9612 CATHETERIZE FOR URINE SPEC: HCPCS

## 2022-01-01 PROCEDURE — 80143 DRUG ASSAY ACETAMINOPHEN: CPT | Performed by: EMERGENCY MEDICINE

## 2022-01-01 PROCEDURE — 84145 PROCALCITONIN (PCT): CPT | Performed by: EMERGENCY MEDICINE

## 2022-01-01 PROCEDURE — 99233 SBSQ HOSP IP/OBS HIGH 50: CPT | Performed by: INTERNAL MEDICINE

## 2022-01-01 PROCEDURE — 87636 SARSCOV2 & INF A&B AMP PRB: CPT | Performed by: INTERNAL MEDICINE

## 2022-01-01 PROCEDURE — 81003 URINALYSIS AUTO W/O SCOPE: CPT | Performed by: EMERGENCY MEDICINE

## 2022-01-01 PROCEDURE — 83735 ASSAY OF MAGNESIUM: CPT | Performed by: NURSE PRACTITIONER

## 2022-01-01 PROCEDURE — 99232 SBSQ HOSP IP/OBS MODERATE 35: CPT | Performed by: HOSPITALIST

## 2022-01-01 PROCEDURE — 82550 ASSAY OF CK (CPK): CPT | Performed by: INTERNAL MEDICINE

## 2022-01-01 PROCEDURE — 99232 SBSQ HOSP IP/OBS MODERATE 35: CPT | Performed by: CLINICAL NURSE SPECIALIST

## 2022-01-01 PROCEDURE — 80053 COMPREHEN METABOLIC PANEL: CPT | Performed by: EMERGENCY MEDICINE

## 2022-01-01 PROCEDURE — 70450 CT HEAD/BRAIN W/O DYE: CPT

## 2022-01-01 PROCEDURE — 83036 HEMOGLOBIN GLYCOSYLATED A1C: CPT | Performed by: INTERNAL MEDICINE

## 2022-01-01 PROCEDURE — 93880 EXTRACRANIAL BILAT STUDY: CPT

## 2022-01-01 PROCEDURE — 83735 ASSAY OF MAGNESIUM: CPT | Performed by: EMERGENCY MEDICINE

## 2022-01-01 PROCEDURE — 82550 ASSAY OF CK (CPK): CPT | Performed by: EMERGENCY MEDICINE

## 2022-01-01 PROCEDURE — 94640 AIRWAY INHALATION TREATMENT: CPT

## 2022-01-01 RX ORDER — ONDANSETRON 2 MG/ML
4 INJECTION INTRAMUSCULAR; INTRAVENOUS ONCE
Status: COMPLETED | OUTPATIENT
Start: 2022-01-01 | End: 2022-01-01

## 2022-01-01 RX ORDER — CARVEDILOL 6.25 MG/1
12.5 TABLET ORAL 2 TIMES DAILY WITH MEALS
Status: CANCELLED | OUTPATIENT
Start: 2022-01-01

## 2022-01-01 RX ORDER — PITAVASTATIN CALCIUM 4.18 MG/1
4 TABLET, FILM COATED ORAL NIGHTLY
Qty: 90 TABLET | Refills: 3 | Status: SHIPPED | OUTPATIENT
Start: 2022-01-01

## 2022-01-01 RX ORDER — METOPROLOL TARTRATE 5 MG/5ML
INJECTION INTRAVENOUS
Status: COMPLETED | OUTPATIENT
Start: 2022-01-01 | End: 2022-01-01

## 2022-01-01 RX ORDER — INSULIN LISPRO 100 [IU]/ML
0-7 INJECTION, SOLUTION INTRAVENOUS; SUBCUTANEOUS
Status: DISCONTINUED | OUTPATIENT
Start: 2022-01-01 | End: 2022-01-01 | Stop reason: HOSPADM

## 2022-01-01 RX ORDER — AMLODIPINE BESYLATE 10 MG/1
10 TABLET ORAL DAILY
Status: DISCONTINUED | OUTPATIENT
Start: 2022-01-01 | End: 2022-01-01 | Stop reason: HOSPADM

## 2022-01-01 RX ORDER — MORPHINE SULFATE 2 MG/ML
2 INJECTION, SOLUTION INTRAMUSCULAR; INTRAVENOUS
Status: DISCONTINUED | OUTPATIENT
Start: 2022-01-01 | End: 2022-01-01 | Stop reason: HOSPADM

## 2022-01-01 RX ORDER — SODIUM CHLORIDE 0.9 % (FLUSH) 0.9 %
10 SYRINGE (ML) INJECTION AS NEEDED
Status: DISCONTINUED | OUTPATIENT
Start: 2022-01-01 | End: 2022-01-01 | Stop reason: HOSPADM

## 2022-01-01 RX ORDER — HYDRALAZINE HYDROCHLORIDE 50 MG/1
50 TABLET, FILM COATED ORAL EVERY 8 HOURS SCHEDULED
Qty: 270 TABLET | Refills: 3 | Status: SHIPPED | OUTPATIENT
Start: 2022-01-01

## 2022-01-01 RX ORDER — ROSUVASTATIN CALCIUM 20 MG/1
10 TABLET, COATED ORAL NIGHTLY
Status: DISCONTINUED | OUTPATIENT
Start: 2022-01-01 | End: 2022-01-01 | Stop reason: HOSPADM

## 2022-01-01 RX ORDER — MORPHINE SULFATE 2 MG/ML
1 INJECTION, SOLUTION INTRAMUSCULAR; INTRAVENOUS
Status: DISCONTINUED | OUTPATIENT
Start: 2022-01-01 | End: 2022-01-01

## 2022-01-01 RX ORDER — IPRATROPIUM BROMIDE AND ALBUTEROL SULFATE 2.5; .5 MG/3ML; MG/3ML
3 SOLUTION RESPIRATORY (INHALATION)
Status: DISCONTINUED | OUTPATIENT
Start: 2022-01-01 | End: 2022-01-01 | Stop reason: HOSPADM

## 2022-01-01 RX ORDER — CLOPIDOGREL BISULFATE 75 MG/1
300 TABLET ORAL ONCE
Status: COMPLETED | OUTPATIENT
Start: 2022-01-01 | End: 2022-01-01

## 2022-01-01 RX ORDER — GLYCOPYRROLATE 0.2 MG/ML
0.1 INJECTION INTRAMUSCULAR; INTRAVENOUS EVERY 4 HOURS PRN
Status: DISCONTINUED | OUTPATIENT
Start: 2022-01-01 | End: 2022-01-01 | Stop reason: HOSPADM

## 2022-01-01 RX ORDER — ASPIRIN 81 MG/1
81 TABLET, CHEWABLE ORAL DAILY
Status: DISCONTINUED | OUTPATIENT
Start: 2022-01-01 | End: 2022-01-01 | Stop reason: HOSPADM

## 2022-01-01 RX ORDER — LATANOPROST 50 UG/ML
1 SOLUTION/ DROPS OPHTHALMIC NIGHTLY
Status: DISCONTINUED | OUTPATIENT
Start: 2022-01-01 | End: 2022-01-01 | Stop reason: HOSPADM

## 2022-01-01 RX ORDER — MORPHINE SULFATE 2 MG/ML
1 INJECTION, SOLUTION INTRAMUSCULAR; INTRAVENOUS ONCE
Status: COMPLETED | OUTPATIENT
Start: 2022-01-01 | End: 2022-01-01

## 2022-01-01 RX ORDER — LABETALOL HYDROCHLORIDE 5 MG/ML
10 INJECTION, SOLUTION INTRAVENOUS EVERY 6 HOURS PRN
Status: DISCONTINUED | OUTPATIENT
Start: 2022-01-01 | End: 2022-01-01 | Stop reason: HOSPADM

## 2022-01-01 RX ORDER — HYDROCODONE BITARTRATE AND ACETAMINOPHEN 5; 325 MG/1; MG/1
1 TABLET ORAL EVERY 4 HOURS PRN
Status: DISCONTINUED | OUTPATIENT
Start: 2022-01-01 | End: 2022-01-01 | Stop reason: HOSPADM

## 2022-01-01 RX ORDER — ACETAMINOPHEN 325 MG/1
650 TABLET ORAL EVERY 4 HOURS PRN
Status: DISCONTINUED | OUTPATIENT
Start: 2022-01-01 | End: 2022-01-01 | Stop reason: HOSPADM

## 2022-01-01 RX ORDER — FUROSEMIDE 10 MG/ML
40 INJECTION INTRAMUSCULAR; INTRAVENOUS ONCE
Status: COMPLETED | OUTPATIENT
Start: 2022-01-01 | End: 2022-01-01

## 2022-01-01 RX ORDER — FUROSEMIDE 10 MG/ML
INJECTION INTRAMUSCULAR; INTRAVENOUS
Status: COMPLETED | OUTPATIENT
Start: 2022-01-01 | End: 2022-01-01

## 2022-01-01 RX ORDER — GLIPIZIDE 5 MG/1
5 TABLET ORAL
Status: DISCONTINUED | OUTPATIENT
Start: 2022-01-01 | End: 2022-01-01

## 2022-01-01 RX ORDER — PANTOPRAZOLE SODIUM 40 MG/1
40 TABLET, DELAYED RELEASE ORAL
Status: DISCONTINUED | OUTPATIENT
Start: 2022-01-01 | End: 2022-01-01 | Stop reason: HOSPADM

## 2022-01-01 RX ORDER — LORAZEPAM 2 MG/ML
0.25 INJECTION INTRAMUSCULAR EVERY 4 HOURS PRN
Status: DISCONTINUED | OUTPATIENT
Start: 2022-01-01 | End: 2022-01-01 | Stop reason: HOSPADM

## 2022-01-01 RX ORDER — METOPROLOL TARTRATE 5 MG/5ML
5 INJECTION INTRAVENOUS ONCE
Status: COMPLETED | OUTPATIENT
Start: 2022-01-01 | End: 2022-01-01

## 2022-01-01 RX ORDER — CLOPIDOGREL BISULFATE 75 MG/1
75 TABLET ORAL DAILY
Status: DISCONTINUED | OUTPATIENT
Start: 2022-01-01 | End: 2022-01-01 | Stop reason: HOSPADM

## 2022-01-01 RX ORDER — DEXTROSE MONOHYDRATE 25 G/50ML
25 INJECTION, SOLUTION INTRAVENOUS
Status: DISCONTINUED | OUTPATIENT
Start: 2022-01-01 | End: 2022-01-01 | Stop reason: HOSPADM

## 2022-01-01 RX ORDER — METOLAZONE 5 MG/1
10 TABLET ORAL DAILY
Status: ACTIVE | OUTPATIENT
Start: 2022-01-01 | End: 2022-01-01

## 2022-01-01 RX ORDER — SODIUM CHLORIDE 9 MG/ML
75 INJECTION, SOLUTION INTRAVENOUS CONTINUOUS
Status: DISCONTINUED | OUTPATIENT
Start: 2022-01-01 | End: 2022-01-01

## 2022-01-01 RX ORDER — DIPHENHYDRAMINE HCL 25 MG
25 CAPSULE ORAL ONCE
Status: COMPLETED | OUTPATIENT
Start: 2022-01-01 | End: 2022-01-01

## 2022-01-01 RX ORDER — ATORVASTATIN CALCIUM 40 MG/1
40 TABLET, FILM COATED ORAL DAILY
Status: DISCONTINUED | OUTPATIENT
Start: 2022-01-01 | End: 2022-01-01

## 2022-01-01 RX ORDER — ISOSORBIDE MONONITRATE 60 MG/1
60 TABLET, EXTENDED RELEASE ORAL
Status: DISCONTINUED | OUTPATIENT
Start: 2022-01-01 | End: 2022-01-01 | Stop reason: HOSPADM

## 2022-01-01 RX ORDER — HYDRALAZINE HYDROCHLORIDE 50 MG/1
50 TABLET, FILM COATED ORAL EVERY 8 HOURS SCHEDULED
Status: DISCONTINUED | OUTPATIENT
Start: 2022-01-01 | End: 2022-01-01

## 2022-01-01 RX ORDER — POTASSIUM CHLORIDE 750 MG/1
20 CAPSULE, EXTENDED RELEASE ORAL ONCE
Status: COMPLETED | OUTPATIENT
Start: 2022-01-01 | End: 2022-01-01

## 2022-01-01 RX ORDER — DORZOLAMIDE HYDROCHLORIDE AND TIMOLOL MALEATE 20; 5 MG/ML; MG/ML
1 SOLUTION/ DROPS OPHTHALMIC 2 TIMES DAILY
COMMUNITY
Start: 2021-01-01

## 2022-01-01 RX ORDER — NICOTINE POLACRILEX 4 MG
15 LOZENGE BUCCAL
Status: DISCONTINUED | OUTPATIENT
Start: 2022-01-01 | End: 2022-01-01 | Stop reason: HOSPADM

## 2022-01-01 RX ORDER — ASPIRIN 325 MG
325 TABLET ORAL ONCE
Status: COMPLETED | OUTPATIENT
Start: 2022-01-01 | End: 2022-01-01

## 2022-01-01 RX ORDER — GLIPIZIDE 5 MG/1
1 TABLET ORAL 2 TIMES DAILY
COMMUNITY

## 2022-01-01 RX ORDER — SODIUM CHLORIDE 0.9 % (FLUSH) 0.9 %
10 SYRINGE (ML) INJECTION EVERY 12 HOURS SCHEDULED
Status: DISCONTINUED | OUTPATIENT
Start: 2022-01-01 | End: 2022-01-01 | Stop reason: HOSPADM

## 2022-01-01 RX ORDER — LORAZEPAM 0.5 MG/1
0.25 TABLET ORAL EVERY 6 HOURS PRN
Status: DISCONTINUED | OUTPATIENT
Start: 2022-01-01 | End: 2022-01-01

## 2022-01-01 RX ADMIN — CLOPIDOGREL BISULFATE 300 MG: 75 TABLET ORAL at 16:00

## 2022-01-01 RX ADMIN — IPRATROPIUM BROMIDE AND ALBUTEROL SULFATE 3 ML: .5; 3 SOLUTION RESPIRATORY (INHALATION) at 07:39

## 2022-01-01 RX ADMIN — MORPHINE SULFATE 1 MG: 2 INJECTION, SOLUTION INTRAMUSCULAR; INTRAVENOUS at 11:33

## 2022-01-01 RX ADMIN — MORPHINE SULFATE 2 MG: 2 INJECTION, SOLUTION INTRAMUSCULAR; INTRAVENOUS at 13:43

## 2022-01-01 RX ADMIN — NICARDIPINE HYDROCHLORIDE 10 MG/HR: 25 INJECTION, SOLUTION INTRAVENOUS at 22:28

## 2022-01-01 RX ADMIN — Medication 10 ML: at 09:53

## 2022-01-01 RX ADMIN — ROSUVASTATIN 10 MG: 20 TABLET, FILM COATED ORAL at 20:00

## 2022-01-01 RX ADMIN — MORPHINE SULFATE 1 MG: 2 INJECTION, SOLUTION INTRAMUSCULAR; INTRAVENOUS at 18:58

## 2022-01-01 RX ADMIN — ROSUVASTATIN 10 MG: 20 TABLET, FILM COATED ORAL at 20:45

## 2022-01-01 RX ADMIN — MORPHINE SULFATE 2 MG: 2 INJECTION, SOLUTION INTRAMUSCULAR; INTRAVENOUS at 00:17

## 2022-01-01 RX ADMIN — IPRATROPIUM BROMIDE AND ALBUTEROL SULFATE 3 ML: .5; 3 SOLUTION RESPIRATORY (INHALATION) at 16:12

## 2022-01-01 RX ADMIN — GLYCOPYRROLATE 0.1 MG: 0.2 INJECTION INTRAMUSCULAR; INTRAVENOUS at 17:59

## 2022-01-01 RX ADMIN — INSULIN LISPRO 2 UNITS: 100 INJECTION, SOLUTION INTRAVENOUS; SUBCUTANEOUS at 18:32

## 2022-01-01 RX ADMIN — INSULIN LISPRO 5 UNITS: 100 INJECTION, SOLUTION INTRAVENOUS; SUBCUTANEOUS at 11:48

## 2022-01-01 RX ADMIN — SODIUM CHLORIDE 500 ML: 9 INJECTION, SOLUTION INTRAVENOUS at 20:00

## 2022-01-01 RX ADMIN — Medication 10 ML: at 20:46

## 2022-01-01 RX ADMIN — LABETALOL HYDROCHLORIDE 10 MG: 5 INJECTION, SOLUTION INTRAVENOUS at 00:39

## 2022-01-01 RX ADMIN — PANTOPRAZOLE SODIUM 40 MG: 40 TABLET, DELAYED RELEASE ORAL at 06:14

## 2022-01-01 RX ADMIN — IPRATROPIUM BROMIDE AND ALBUTEROL SULFATE 3 ML: .5; 3 SOLUTION RESPIRATORY (INHALATION) at 10:39

## 2022-01-01 RX ADMIN — HYDRALAZINE HYDROCHLORIDE 75 MG: 50 TABLET, FILM COATED ORAL at 06:14

## 2022-01-01 RX ADMIN — LATANOPROST 1 DROP: 50 SOLUTION OPHTHALMIC at 20:46

## 2022-01-01 RX ADMIN — AMLODIPINE BESYLATE 10 MG: 10 TABLET ORAL at 08:18

## 2022-01-01 RX ADMIN — HYDRALAZINE HYDROCHLORIDE 50 MG: 50 TABLET, FILM COATED ORAL at 06:26

## 2022-01-01 RX ADMIN — NICARDIPINE HYDROCHLORIDE 5 MG/HR: 25 INJECTION, SOLUTION INTRAVENOUS at 03:16

## 2022-01-01 RX ADMIN — MORPHINE SULFATE 2 MG: 2 INJECTION, SOLUTION INTRAMUSCULAR; INTRAVENOUS at 04:09

## 2022-01-01 RX ADMIN — NICARDIPINE HYDROCHLORIDE 10 MG/HR: 25 INJECTION, SOLUTION INTRAVENOUS at 10:27

## 2022-01-01 RX ADMIN — ASPIRIN 81 MG CHEWABLE TABLET 81 MG: 81 TABLET CHEWABLE at 08:18

## 2022-01-01 RX ADMIN — HYDRALAZINE HYDROCHLORIDE 75 MG: 50 TABLET, FILM COATED ORAL at 20:46

## 2022-01-01 RX ADMIN — IPRATROPIUM BROMIDE AND ALBUTEROL SULFATE 3 ML: .5; 3 SOLUTION RESPIRATORY (INHALATION) at 17:09

## 2022-01-01 RX ADMIN — Medication 10 ML: at 21:08

## 2022-01-01 RX ADMIN — NICARDIPINE HYDROCHLORIDE 10 MG/HR: 25 INJECTION, SOLUTION INTRAVENOUS at 08:17

## 2022-01-01 RX ADMIN — INSULIN DETEMIR 10 UNITS: 100 INJECTION, SOLUTION SUBCUTANEOUS at 08:18

## 2022-01-01 RX ADMIN — FUROSEMIDE 40 MG: 10 INJECTION INTRAMUSCULAR; INTRAVENOUS at 02:50

## 2022-01-01 RX ADMIN — HYDRALAZINE HYDROCHLORIDE 50 MG: 50 TABLET, FILM COATED ORAL at 16:00

## 2022-01-01 RX ADMIN — ASPIRIN 325 MG ORAL TABLET 325 MG: 325 PILL ORAL at 19:53

## 2022-01-01 RX ADMIN — IPRATROPIUM BROMIDE AND ALBUTEROL SULFATE 3 ML: .5; 3 SOLUTION RESPIRATORY (INHALATION) at 11:43

## 2022-01-01 RX ADMIN — HYDRALAZINE HYDROCHLORIDE 50 MG: 50 TABLET, FILM COATED ORAL at 06:16

## 2022-01-01 RX ADMIN — METOPROLOL TARTRATE 5 MG: 1 INJECTION, SOLUTION INTRAVENOUS at 02:12

## 2022-01-01 RX ADMIN — NICARDIPINE HYDROCHLORIDE 10 MG/HR: 25 INJECTION, SOLUTION INTRAVENOUS at 17:37

## 2022-01-01 RX ADMIN — SODIUM CHLORIDE 100 ML/HR: 9 INJECTION, SOLUTION INTRAVENOUS at 21:14

## 2022-01-01 RX ADMIN — HYDRALAZINE HYDROCHLORIDE 75 MG: 50 TABLET, FILM COATED ORAL at 20:38

## 2022-01-01 RX ADMIN — Medication 10 ML: at 20:40

## 2022-01-01 RX ADMIN — ISOSORBIDE MONONITRATE 60 MG: 60 TABLET, EXTENDED RELEASE ORAL at 08:18

## 2022-01-01 RX ADMIN — INSULIN LISPRO 4 UNITS: 100 INJECTION, SOLUTION INTRAVENOUS; SUBCUTANEOUS at 09:12

## 2022-01-01 RX ADMIN — IPRATROPIUM BROMIDE AND ALBUTEROL SULFATE 3 ML: .5; 3 SOLUTION RESPIRATORY (INHALATION) at 20:13

## 2022-01-01 RX ADMIN — NICARDIPINE HYDROCHLORIDE 5 MG/HR: 25 INJECTION, SOLUTION INTRAVENOUS at 13:00

## 2022-01-01 RX ADMIN — IPRATROPIUM BROMIDE AND ALBUTEROL SULFATE 3 ML: .5; 3 SOLUTION RESPIRATORY (INHALATION) at 12:49

## 2022-01-01 RX ADMIN — LORAZEPAM 0.25 MG: 2 INJECTION, SOLUTION INTRAMUSCULAR; INTRAVENOUS at 05:39

## 2022-01-01 RX ADMIN — GLYCOPYRROLATE 0.1 MG: 0.2 INJECTION INTRAMUSCULAR; INTRAVENOUS at 01:21

## 2022-01-01 RX ADMIN — DIPHENHYDRAMINE HYDROCHLORIDE 25 MG: 25 CAPSULE ORAL at 19:53

## 2022-01-01 RX ADMIN — IPRATROPIUM BROMIDE AND ALBUTEROL SULFATE 3 ML: .5; 3 SOLUTION RESPIRATORY (INHALATION) at 14:53

## 2022-01-01 RX ADMIN — GLYCOPYRROLATE 0.1 MG: 0.2 INJECTION INTRAMUSCULAR; INTRAVENOUS at 09:21

## 2022-01-01 RX ADMIN — HYDRALAZINE HYDROCHLORIDE 75 MG: 50 TABLET, FILM COATED ORAL at 15:18

## 2022-01-01 RX ADMIN — HYDRALAZINE HYDROCHLORIDE 50 MG: 50 TABLET, FILM COATED ORAL at 20:00

## 2022-01-01 RX ADMIN — IPRATROPIUM BROMIDE AND ALBUTEROL SULFATE 3 ML: .5; 3 SOLUTION RESPIRATORY (INHALATION) at 15:49

## 2022-01-01 RX ADMIN — BUMETANIDE 1 MG/HR: 0.25 INJECTION, SOLUTION INTRAMUSCULAR; INTRAVENOUS at 13:42

## 2022-01-01 RX ADMIN — MORPHINE SULFATE 2 MG: 2 INJECTION, SOLUTION INTRAMUSCULAR; INTRAVENOUS at 12:12

## 2022-01-01 RX ADMIN — IPRATROPIUM BROMIDE AND ALBUTEROL SULFATE 3 ML: .5; 3 SOLUTION RESPIRATORY (INHALATION) at 07:35

## 2022-01-01 RX ADMIN — INSULIN DETEMIR 10 UNITS: 100 INJECTION, SOLUTION SUBCUTANEOUS at 08:17

## 2022-01-01 RX ADMIN — ATORVASTATIN CALCIUM 40 MG: 40 TABLET, FILM COATED ORAL at 08:18

## 2022-01-01 RX ADMIN — ASPIRIN 81 MG CHEWABLE TABLET 81 MG: 81 TABLET CHEWABLE at 09:12

## 2022-01-01 RX ADMIN — MORPHINE SULFATE 1 MG: 2 INJECTION, SOLUTION INTRAMUSCULAR; INTRAVENOUS at 08:16

## 2022-01-01 RX ADMIN — NICARDIPINE HYDROCHLORIDE 5 MG/HR: 25 INJECTION, SOLUTION INTRAVENOUS at 01:54

## 2022-01-01 RX ADMIN — PANTOPRAZOLE SODIUM 40 MG: 40 TABLET, DELAYED RELEASE ORAL at 06:16

## 2022-01-01 RX ADMIN — POTASSIUM CHLORIDE 20 MEQ: 750 CAPSULE, EXTENDED RELEASE ORAL at 19:53

## 2022-01-01 RX ADMIN — IPRATROPIUM BROMIDE AND ALBUTEROL SULFATE 3 ML: .5; 3 SOLUTION RESPIRATORY (INHALATION) at 21:07

## 2022-01-01 RX ADMIN — INSULIN LISPRO 3 UNITS: 100 INJECTION, SOLUTION INTRAVENOUS; SUBCUTANEOUS at 17:11

## 2022-01-01 RX ADMIN — INSULIN LISPRO 5 UNITS: 100 INJECTION, SOLUTION INTRAVENOUS; SUBCUTANEOUS at 08:18

## 2022-01-01 RX ADMIN — MORPHINE SULFATE 1 MG: 2 INJECTION, SOLUTION INTRAMUSCULAR; INTRAVENOUS at 07:48

## 2022-01-01 RX ADMIN — NICARDIPINE HYDROCHLORIDE 10 MG/HR: 25 INJECTION, SOLUTION INTRAVENOUS at 13:27

## 2022-01-01 RX ADMIN — SODIUM CHLORIDE 75 ML/HR: 9 INJECTION, SOLUTION INTRAVENOUS at 10:02

## 2022-01-01 RX ADMIN — NICARDIPINE HYDROCHLORIDE 10 MG/HR: 25 INJECTION, SOLUTION INTRAVENOUS at 20:20

## 2022-01-01 RX ADMIN — INSULIN LISPRO 4 UNITS: 100 INJECTION, SOLUTION INTRAVENOUS; SUBCUTANEOUS at 11:52

## 2022-01-01 RX ADMIN — NICARDIPINE HYDROCHLORIDE 5 MG/HR: 25 INJECTION, SOLUTION INTRAVENOUS at 21:59

## 2022-01-01 RX ADMIN — IPRATROPIUM BROMIDE AND ALBUTEROL SULFATE 3 ML: .5; 3 SOLUTION RESPIRATORY (INHALATION) at 08:47

## 2022-01-01 RX ADMIN — NICARDIPINE HYDROCHLORIDE 5 MG/HR: 25 INJECTION, SOLUTION INTRAVENOUS at 07:42

## 2022-01-01 RX ADMIN — INSULIN DETEMIR 10 UNITS: 100 INJECTION, SOLUTION SUBCUTANEOUS at 09:13

## 2022-01-01 RX ADMIN — IPRATROPIUM BROMIDE AND ALBUTEROL SULFATE 3 ML: .5; 3 SOLUTION RESPIRATORY (INHALATION) at 20:42

## 2022-01-01 RX ADMIN — IPRATROPIUM BROMIDE AND ALBUTEROL SULFATE 3 ML: .5; 3 SOLUTION RESPIRATORY (INHALATION) at 07:59

## 2022-01-01 RX ADMIN — ONDANSETRON 4 MG: 2 INJECTION INTRAMUSCULAR; INTRAVENOUS at 20:27

## 2022-01-01 RX ADMIN — LABETALOL HYDROCHLORIDE 10 MG: 5 INJECTION, SOLUTION INTRAVENOUS at 09:54

## 2022-01-01 RX ADMIN — MORPHINE SULFATE 1 MG: 2 INJECTION, SOLUTION INTRAMUSCULAR; INTRAVENOUS at 09:36

## 2022-01-01 RX ADMIN — Medication 10 ML: at 21:14

## 2022-01-01 RX ADMIN — INSULIN LISPRO 4 UNITS: 100 INJECTION, SOLUTION INTRAVENOUS; SUBCUTANEOUS at 12:22

## 2022-01-01 RX ADMIN — INSULIN HUMAN 7 UNITS: 100 INJECTION, SOLUTION PARENTERAL at 19:59

## 2022-01-01 RX ADMIN — IPRATROPIUM BROMIDE AND ALBUTEROL SULFATE 3 ML: .5; 3 SOLUTION RESPIRATORY (INHALATION) at 19:24

## 2022-01-01 RX ADMIN — ISOSORBIDE MONONITRATE 60 MG: 60 TABLET, EXTENDED RELEASE ORAL at 09:12

## 2022-01-01 RX ADMIN — METOPROLOL TARTRATE 5 MG: 5 INJECTION INTRAVENOUS at 02:50

## 2022-01-01 RX ADMIN — IPRATROPIUM BROMIDE AND ALBUTEROL SULFATE 3 ML: .5; 3 SOLUTION RESPIRATORY (INHALATION) at 13:13

## 2022-01-01 RX ADMIN — MORPHINE SULFATE 2 MG: 2 INJECTION, SOLUTION INTRAMUSCULAR; INTRAVENOUS at 18:48

## 2022-01-01 RX ADMIN — AMLODIPINE BESYLATE 10 MG: 10 TABLET ORAL at 09:12

## 2022-01-01 RX ADMIN — INSULIN LISPRO 3 UNITS: 100 INJECTION, SOLUTION INTRAVENOUS; SUBCUTANEOUS at 07:58

## 2022-01-01 RX ADMIN — LATANOPROST 1 DROP: 50 SOLUTION OPHTHALMIC at 20:39

## 2022-01-01 RX ADMIN — LATANOPROST 1 DROP: 50 SOLUTION OPHTHALMIC at 20:21

## 2022-01-01 RX ADMIN — ROSUVASTATIN 10 MG: 20 TABLET, FILM COATED ORAL at 20:38

## 2022-01-01 RX ADMIN — FUROSEMIDE 40 MG: 10 INJECTION, SOLUTION INTRAMUSCULAR; INTRAVENOUS at 02:10

## 2022-01-01 RX ADMIN — MORPHINE SULFATE 1 MG: 2 INJECTION, SOLUTION INTRAMUSCULAR; INTRAVENOUS at 15:38

## 2022-01-01 RX ADMIN — LABETALOL HYDROCHLORIDE 10 MG: 5 INJECTION, SOLUTION INTRAVENOUS at 00:16

## 2022-01-01 RX ADMIN — CLOPIDOGREL BISULFATE 75 MG: 75 TABLET ORAL at 09:12

## 2022-01-01 RX ADMIN — GLIPIZIDE 5 MG: 5 TABLET ORAL at 08:18

## 2022-04-04 NOTE — PROGRESS NOTES
[facility]   [address], [address]  [place], [place], [address]    [phone]  WWW.[facility]           OUTPATIENT CLINIC FOLLOW UP NOTE    Patient Care Team:  Patient Care Team:  Santiago Kuhn MD as PCP - General (Internal Medicine)    Subjective:      Chief Complaint   Patient presents with   • Coronary artery disease involving native coronary artery of     HPI:    Varinder Delgado is a 89 y.o. female.  Cardiac focused, problem list:  1. CAD  1. NSTEMI 7/2018 with TOSHA to the LAD.  2. Hypertension  3. Hyperlipidemia  4. Diabetes mellitus  5. Tobacco dependence  6. CKD followed by [facility]   7. Claudication     Mrs. Delgado presents for follow up today after a 1.5 year hiatus. She denies any cardiac symptoms or concerns since her last visit. She reports home BPs are 140-150s mmHg systolic however she has not checked them in the past 3-4 weeks. She reports bilateral hip pain and leg pain with ambulation, however she thinks it is related to arthritis. She does not have any pain at rest or laying down, however it occurs when she initially stands up and then if she ambulates. This pain has been present for some time, and is stable. She is able to do all her usual activities without any limitations. She is scheduled to see her nephrologist in May. She is still smoking occasionally. She stopped taking Livalo due to cost concerns.     Review of Systems:  As noted above in the HPI     PFSH:  Patient Active Problem List   Diagnosis   • Essential hypertension   • Mixed hyperlipidemia   • Tobacco abuse   • Coronary artery disease involving native coronary artery of native heart without angina pectoris   • Claudication ([facility])   • Lower extremity edema   • Type 2 diabetes mellitus with diabetic nephropathy, with long-term current use of insulin ([facility])   • Glaucoma   • Debility   • ATN (acute tubular necrosis) ([facility])   • Renal artery stenosis, right       Current Outpatient Medications:  "  •  acetaminophen (TYLENOL) 500 MG tablet, Take 500 mg by mouth Every 6 (Six) Hours As Needed for Mild Pain ., Disp: , Rfl:   •  amLODIPine (NORVASC) 10 MG tablet, Take 1 tablet by mouth Daily., Disp: 90 tablet, Rfl: 3  •  aspirin 81 MG chewable tablet, Chew 81 mg Daily., Disp: , Rfl:   •  carvedilol (COREG) 12.5 MG tablet, Take 1 tablet by mouth 2 (Two) Times a Day With Meals., Disp: 180 tablet, Rfl: 3  •  dorzolamide-timolol (COSOPT) 22.3-6.8 MG/ML ophthalmic solution, Administer 1 drop to both eyes 2 (Two) Times a Day., Disp: , Rfl:   •  glipizide (GLUCOTROL) 5 MG tablet, Take 1 tablet by mouth 2 (Two) Times a Day., Disp: , Rfl:   •  hydrALAZINE (APRESOLINE) 50 MG tablet, TAKE 1 TABLET BY MOUTH EVERY 8 (EIGHT) HOURS., Disp: 270 tablet, Rfl: 3  •  insulin lispro (humaLOG) 100 UNIT/ML injection, Inject  under the skin into the appropriate area as directed As Needed. Sliding scale, Disp: , Rfl:   •  isosorbide mononitrate (IMDUR) 60 MG 24 hr tablet, Take 1 tablet by mouth Daily., Disp: 90 tablet, Rfl: 3  •  travoprost, BAK free, (TRAVATAN) 0.004 % solution ophthalmic solution, Administer 1 drop to the right eye Every Night., Disp: , Rfl:   •  ipratropium-albuterol (DUO-NEB) 0.5-2.5 mg/3 ml nebulizer, Take 3 mL by nebulization 4 (Four) Times a Day., Disp: 360 mL, Rfl:     Allergies   Allergen Reactions   • Contrast Dye Swelling   • Enalapril Angioedema   • Aspirin Itching   • Enalapril Maleate Angioedema        reports that she quit smoking about 2 years ago. Her smoking use included cigarettes. She started smoking about 3 years ago. She has a 27.50 pack-year smoking history. She has never used smokeless tobacco.      Objective:   Physical exam:  /70 (BP Location: Left arm, Patient Position: Sitting, Cuff Size: Adult)   Pulse 75   Ht 157.5 cm (62\")   Wt 63 kg (139 lb)   SpO2 97%   BMI 25.42 kg/m²   CONSTITUTIONAL: No acute distress  RESPIRATORY: Normal effort. Clear to auscultation bilaterally without " wheezing or rales  CARDIOVASCULAR: Bilateral carotid bruits.  Regular rate and rhythm with normal S1 and S2. Without murmur, gallop or rub. Normal radial pulse. There is no lower extremity edema bilaterally.    2/3/2020 exam: Nonpalpable pedal pulses right foot, 1+ DP and PT left foot   4/2022 exam: Nonpalpable right pedal pulses, faint left PT pulse, no wounds    Labs:  BUN   Date Value Ref Range Status   02/13/2020 44 (H) 8 - 23 mg/dL Final     Creatinine   Date Value Ref Range Status   02/13/2020 1.63 (H) 0.57 - 1.00 mg/dL Final     Potassium   Date Value Ref Range Status   02/13/2020 3.5 3.5 - 5.2 mmol/L Final     ALT (SGPT)   Date Value Ref Range Status   07/16/2021 8 0 - 40 U/L Final     AST (SGOT)   Date Value Ref Range Status   07/16/2021 19 0 - 45 U/L Final       Lab Results   Component Value Date    CHOL 210 (H) 07/28/2018     Lab Results   Component Value Date    TRIG 63 07/28/2018     Lab Results   Component Value Date    HDL 48 07/28/2018     Lab Results   Component Value Date     (H) 02/03/2020     No components found for: LDLDIRECTC    Diagnostic Data:    Procedures    Results for orders placed during the hospital encounter of 10/22/18    Adult Transthoracic Echo Limited W/ Cont if Necessary Per Protocol    Interpretation Summary  · This was a limited echocardiogram to assess left ventricular ejection fraction.  · Left ventricular systolic function is normal. Estimated EF = 60%.  · Left ventricular wall thickness is consistent with moderate concentric hypertrophy.    Advance Care Planning   ACP discussion was declined by the patient. Patient does not have an advance directive, declines further assistance.       Assessment and Plan:     Coronary artery disease involving native coronary artery of native heart without angina pectoris  Mixed hyperlipidemia  -CCS 0  -Had myalgias with atorvastatin.  Tolerated Livalo but stopped taking it due to cost concerns  -Continue aspirin, beta-blocker,  amlodipine  -Recommend resuming Livalo due to risk reduction and will work on prior authorization     Chronic systolic heart failure  Lower extremity swelling  -NYHA I, EF has improved back to 60%  -Continue current related medications     Carotid Bruits  -Mild right, moderate left stenosis per carotid duplex 6/2020  -Asymptomatic    -Continue ASA, statin     CKD III  Renal artery stenosis  Essential hypertension  - BPs are elevated today, however she reports home readings of 140-150s mmHg  - She follows with nephrology associates and has an upcoming appointment with their office next month   - Has some medical renal disease and mild to moderate right renal artery atherosclerosis but no severe stenosis.  - Continue aspirin, statin    Possible claudication  -Currently stable without any evidence of threatened tissue loss and no limiting symptoms at the current time  -Also has arthritic pains  -Will treat conservatively for now, however she will call us should this pain worsen   -Continue aspirin, statin  -If symptoms worsen, would consider arterial duplex ultrasound followed by Pletal therapy     Tobacco Dependence  -Did not tolerate Chantix.  -Continues to smoke, encouraged cessation        - Return in about 1 year (around 4/4/2023).      Scribed for Sreekanth De La Rosa MD by Kaycee Robles PA-C. 4/4/2022  17:05 EDT    I, Sreekanth De La Rosa MD, personally performed the services as scribed by the above named individual. I have made any necessary edits and it is both accurate and complete.     Sreekanth De La Rosa MD, MSc, FACC, [facility]  Interventional Cardiology  [facility]

## 2022-06-22 PROBLEM — E83.52 HYPERCALCEMIA: Status: ACTIVE | Noted: 2022-01-01

## 2022-07-02 NOTE — DISCHARGE SUMMARY
[facility]  DISCHARGE SUMMARY    Patient Name: Varinder Delgado  : 1931  MRN: [id number]    Date of Admission: 2022  5:34 PM  Date of Discharge: 2022  Primary Care Physician: Bing Collins MD    Consults     Date and Time Order Name Status Description    2022  6:28 AM Inpatient Nephrology Consult Completed     2022 11:05 AM Inpatient Urology Consult Completed     2022 12:34 AM Inpatient Cardiology Consult Completed     2022  5:36 PM Inpatient Neurology Consult Stroke Completed           Hospital Course     Presenting Problem:   Hypercalcemia [E83.52]  Hypercalcemia [E83.52]    Active Hospital Problems    Diagnosis  POA   • Hypercalcemia [E83.52]  Yes      Resolved Hospital Problems   No resolved problems to display.          89F with near-syncope (likely multifactorial) also hyperglycemia and elevated troponin.     Acute hypoxic respiratory failure  -Required BiPAP overnight, suspect secondary to heart failure exacerbation. Continue bipap.   -Chest x-ray obtained showed CHF  - continue prn Morphine and ativan.  Will give increased dose of morphine as 1 mg not cutting it.  Discussed with nursing staff  -No improvement in output with Bumex drip  -Patient continued to have respiratory decline requiring BiPAP.  Eventually she passed away from her respiratory failure.  -Hospice was consulted but patient passed away prior to electing to pursue hospice     Acute renal failure on Chronic kidney disease  -  SALOMÓN on CKD.  -Oliguric  -No improvement with Bumex drip  - Patient decided against dialysis          Day of Discharge       Pertinent  and/or Most Recent Results     LAB RESULTS:                              Brief Urine Lab Results  (Last result in the past 365 days)      Color   Clarity   Blood   Leuk Est   Nitrite   Protein   CREAT   Urine HCG        22 1831 Yellow   Clear   Negative   Negative   Negative   Negative                Microbiology Results (last 10 days)     Procedure Component Value - Date/Time    COVID PRE-OP / PRE-PROCEDURE SCREENING ORDER (NO ISOLATION) - Swab, Nasopharynx [[id number]]  (Normal) Collected: 06/22/22 2328    Lab Status: Final result Specimen: Swab from Nasopharynx Updated: 06/23/22 0008    Narrative:      The following orders were created for panel order COVID PRE-OP / PRE-PROCEDURE SCREENING ORDER (NO ISOLATION) - Swab, Nasopharynx.  Procedure                               Abnormality         Status                     ---------                               -----------         ------                     COVID-19 and FLU A/B PCR...[[id number]]  Normal              Final result                 Please view results for these tests on the individual orders.    COVID-19 and FLU A/B PCR - Swab, Nasopharynx [[id number]]  (Normal) Collected: 06/22/22 2328    Lab Status: Final result Specimen: Swab from Nasopharynx Updated: 06/23/22 0008     COVID19 Not Detected     Influenza A PCR Not Detected     Influenza B PCR Not Detected    Narrative:      Fact sheet for providers: https://www.fda.gov/media/625262/download    Fact sheet for patients: https://www.fda.gov/media/934697/download    Test performed by PCR.    Blood Culture - Blood, Arm, Left [[id number]]  (Normal) Collected: 06/22/22 1935    Lab Status: Final result Specimen: Blood from Arm, Left Updated: 06/27/22 2017     Blood Culture No growth at 5 days    Blood Culture - Blood, Hand, Right [[id number]]  (Normal) Collected: 06/22/22 1930    Lab Status: Final result Specimen: Blood from Hand, Right Updated: 06/27/22 2017     Blood Culture No growth at 5 days          Adult Transthoracic Echo Complete W/ Cont if Necessary Per Protocol    Result Date: 6/23/2022  · Left ventricular ejection fraction appears to be 66 - 70%. Left ventricular systolic function is normal. · Left ventricular diastolic function is consistent with (grade Ia w/high LAP) impaired relaxation.       EEG    Result Date: 6/24/2022  Reason for referral: 89 y.o.female with altered mental status Technical Summary:  A 19 channel digital EEG was performed using the international 10-20 placement system, including eye leads and EKG leads. Duration: 20 minutes . Findings: The awake tracing shows diffuse medium amplitude 5-7 Hz theta with occasional intermixed slower 4 Hz delta frequencies present.  A clear posterior rhythm is not seen.  Photic stimulation does not change the background.  Hyperventilation is not performed.  Stage II sleep is not seen.  No focal features or epileptiform activity are noted. Video: On Technical quality: Superior EKG: Slightly irregular, 70-80 bpm SUMMARY: Mild generalized slow No focal features or epileptiform activity are seen     Diffuse cerebral dysfunction of mild degree, nonspecific This report is transcribed using the Dragon dictation system.      CT Abdomen Pelvis Without Contrast    Result Date: 6/24/2022  DATE OF EXAM: 6/24/2022 8:06 PM  PROCEDURE: CT ABDOMEN PELVIS WO CONTRAST-  INDICATIONS: urinary retention, evaluate for ureteral obstruction; R73.9-Hyperglycemia, unspecified; R41.0-Disorientation, unspecified; R55-Syncope and collapse; R53.1-Weakness; N18.9-Chronic kidney disease, unspecified; R77.8-Other specified abnormalities of plasma proteins; R94.31-Abnormal electrocardiogram (ECG) (EKG)  COMPARISON: 3/8/2013  TECHNIQUE: Routine transaxial slices were obtained through the abdomen and pelvis without the administration of intravenous contrast. Reconstructed coronal and sagittal images were also obtained. Automated exposure control and iterative construction methods were used.  The radiation dose reduction device was turned on for each scan per the ALARA (As Low as Reasonably Achievable) protocol.  FINDINGS: There is a moderate right pleural effusion and small left pleural effusion. Patchy and fairly extensive bibasilar pulmonary disease resembles pneumonia more than  pulmonary edema. No pericardial effusion is seen.  No significant abnormalities are seen of the liver, spleen, pancreas, or adrenal glands. Left adrenal gland appears slightly hyperplastic but unchanged. Gallbladder is moderately distended, and has a somewhat indistinct wall, which is thought to be due to the generalized motion-related blurring present on these images rather than inflammation. No biliary ductal dilatation is seen.  Since the prior scan in 2013 there is been interval marked atrophy of the left kidney with no evidence of obstructive uropathy. This is probably due to heavy calcification of the proximal left renal artery causing marked renal artery stenosis. Right kidney appears appropriately hyperplastic and normal for an unenhanced study. No right hydronephrosis is seen.  Regarding the lower abdomen and pelvis, bowel loops are normal in caliber. There is extensive left colon diverticulosis without evidence of diverticulitis. What initially appears to be the bladder actually appears to be a large, 10 x 7.2 x 9.2 cm right adnexal cyst, compressing the collapsed bladder to the left. A Kohli balloon catheter confirms position within the collapsed bladder in the lateral left pelvis. Uterus and left ovary appears appropriately atrophic. No intrapelvic free fluid or inflammatory change is seen.  Bony structures appear to be intact. There is multilevel lumbar degenerative disc disease, and moderate right and advanced left hip joint DJD.         1. Bladder appears compressed into the left pelvis by a large simple appearing right adnexal cyst up to 10 cm in diameter. No aggressive or complex CT features. The bladder itself is decompressed by a Kohli balloon catheter. 2. Atrophic left kidney and hyperplastic right kidney with no evidence of obstructive uropathy. 3. Moderate right and small left pleural effusion. Bilateral lower lobe atelectasis versus pneumonia. 4. Probable motion-related blurring of the  gallbladder giving the appearance of mild gallbladder wall thickening. Motion-related blurring is strongly favored instead.  This report was finalized on 6/24/2022 10:37 PM by Dr. Aaron Robert MD.      XR Chest 1 View    Result Date: 6/25/2022  EXAMINATION: XR CHEST 1 VW  DATE OF EXAM: 6/25/2022 2:16 AM SLOT: 60 HISTORY: Dyspnea COMPARISON: 6/22/2022. FINDINGS: HEART/MEDIASTINUM: Mildly enlarged cardiac silhouette. LUNGS/PLEURA: Perihilar opacities, likely representing mild to moderate pulmonary edema. Small bilateral pleural effusions. MUSCULOSKELETAL: No acute osseous pathology. Thoracic spondylosis     1.  Mild to moderate CHF. Electronically signed by:  Sreekanth Dumont  6/25/2022 12:47 AM Mountain Time    XR Chest 1 View    Result Date: 6/22/2022   DATE OF EXAM: 6/22/2022 7:28 PM  PROCEDURE: XR CHEST 1 VW-  INDICATIONS: Acute Stroke Protocol (onset < 12 hrs)  COMPARISON: 02/07/2020  TECHNIQUE: Portable chest radiograph.  FINDINGS: Heart size within normal limits for portable technique. No pneumothorax. Mild streaky left perihilar airspace disease may relate to atelectasis. No pneumothorax. No significant pleural effusion. Advanced bilateral glenohumeral arthritis.      Mild streaky left perihilar airspace disease which may relate to atelectasis, otherwise no acute process.  This report was finalized on 6/22/2022 7:43 PM by Petros Wilkerson MD.      Duplex Carotid Ultrasound CAR    Result Date: 6/23/2022  · Right internal carotid artery stenosis estimated at the upper end of the 0-49% range. · Left internal carotid artery stenosis of >70%. · Diminished, barely detected waveform in the right vertebral artery. Antegrade flow in the left vertebral artery. · Bilateral subclavian artery stenosis estimated around the 50% range.      CT Head Without Contrast Stroke Protocol    Result Date: 6/22/2022  DATE OF EXAM: 6/22/2022 5:46 PM  PROCEDURE: CT HEAD WO CONTRAST STROKE PROTOCOL-  INDICATIONS: Neuro deficit, acute, stroke  suspected  COMPARISON: 11/9/2020 head CT scan  TECHNIQUE: Routine transaxial and coronal reconstruction images were obtained through the head without the administration of contrast. Automated exposure control and iterative reconstruction methods were used.  The radiation dose reduction device was turned on for each scan per the ALARA (As Low as Reasonably Achievable) protocol.  FINDINGS: The calvarium appears intact. Included paranasal sinuses and mastoids appear clear. No gross abnormality is seen in the orbits. There is moderate generalized cerebral atrophy and chronic appearing white matter changes similar to the prior study. There is no evidence of acute or old infarction, no evidence of intracranial hemorrhage, mass or mass effect, hydrocephalus, or abnormal extra-axial collection.      Stable head CT scan with no evidence of acute intracranial disease.  Exam time shown is 5:49 PM. Study was reviewed on the CT scan monitor and discussed with the stroke team navigator at 5:55 PM.  This report was finalized on 6/22/2022 6:32 PM by Dr. Aaron Robert MD.        Results for orders placed during the hospital encounter of 06/22/22    Duplex Carotid Ultrasound CAR    Interpretation Summary  · Right internal carotid artery stenosis estimated at the upper end of the 0-49% range.  · Left internal carotid artery stenosis of >70%.  · Diminished, barely detected waveform in the right vertebral artery. Antegrade flow in the left vertebral artery.  · Bilateral subclavian artery stenosis estimated around the 50% range.      Results for orders placed during the hospital encounter of 06/22/22    Duplex Carotid Ultrasound CAR    Interpretation Summary  · Right internal carotid artery stenosis estimated at the upper end of the 0-49% range.  · Left internal carotid artery stenosis of >70%.  · Diminished, barely detected waveform in the right vertebral artery. Antegrade flow in the left vertebral artery.  · Bilateral subclavian artery  stenosis estimated around the 50% range.      Results for orders placed during the hospital encounter of 06/22/22    Adult Transthoracic Echo Complete W/ Cont if Necessary Per Protocol    Interpretation Summary  · Left ventricular ejection fraction appears to be 66 - 70%. Left ventricular systolic function is normal.  · Left ventricular diastolic function is consistent with (grade Ia w/high LAP) impaired relaxation.      Plan for Follow-up of Pending Labs/Results:     Discharge Details        Discharge Medications      ASK your doctor about these medications      Instructions Start Date   acetaminophen 500 MG tablet  Commonly known as: TYLENOL   500 mg, Oral, Every 6 Hours PRN      amLODIPine 10 MG tablet  Commonly known as: NORVASC   10 mg, Oral, Daily      aspirin 81 MG chewable tablet   81 mg, Oral, Daily      carvedilol 12.5 MG tablet  Commonly known as: COREG   12.5 mg, Oral, 2 Times Daily With Meals      dorzolamide-timolol 22.3-6.8 MG/ML ophthalmic solution  Commonly known as: COSOPT   1 drop, Both Eyes, 2 Times Daily      glipizide 5 MG tablet  Commonly known as: GLUCOTROL   1 tablet, Oral, 2 Times Daily      hydrALAZINE 50 MG tablet  Commonly known as: APRESOLINE   50 mg, Oral, Every 8 Hours Scheduled      insulin lispro 100 UNIT/ML injection  Commonly known as: humaLOG   Subcutaneous, As Needed, Sliding scale       ipratropium-albuterol 0.5-2.5 mg/3 ml nebulizer  Commonly known as: DUO-NEB   3 mL, Nebulization, 4 Times Daily - RT      isosorbide mononitrate 60 MG 24 hr tablet  Commonly known as: IMDUR   60 mg, Oral, Every 24 Hours Scheduled      Livalo 4 MG tablet  Generic drug: Pitavastatin Calcium   4 mg, Oral, Nightly      travoprost (BAK free) 0.004 % solution ophthalmic solution  Commonly known as: TRAVATAN   1 drop, Right Eye, Nightly             Allergies   Allergen Reactions   • Contrast Dye Swelling   • Enalapril Angioedema   • Aspirin Itching   • Enalapril Maleate Angioedema         Discharge  Disposition:      Diet:  Hospital:No active diet order      Activity:      Restrictions or Other Recommendations:         CODE STATUS:    Code Status and Medical Interventions:   Ordered at: 22 0734     Medical Intervention Limits:    NO intubation (DNI)    NO dialysis     Level Of Support Discussed With:    Patient     Code Status (Patient has no pulse and is not breathing):    No CPR (Do Not Attempt to Resuscitate)     Medical Interventions (Patient has pulse or is breathing):    Limited Support       No future appointments.              Sallie Lewis MD  22

## 2022-07-05 LAB
QT INTERVAL: 342 MS
QTC INTERVAL: 438 MS

## (undated) DEVICE — NC TREK CORONARY DILATATION CATHETER 3.0 MM X 20 MM / RAPID-EXCHANGE: Brand: NC TREK

## (undated) DEVICE — PK CATH CARD 10

## (undated) DEVICE — RADIFOCUS GLIDEWIRE: Brand: GLIDEWIRE

## (undated) DEVICE — RADIFOCUS TORQUE DEVICE MULTI-TORQUE VISE: Brand: RADIFOCUS TORQUE DEVICE

## (undated) DEVICE — PINNACLE INTRODUCER SHEATH: Brand: PINNACLE

## (undated) DEVICE — GW J TP FIX CORE .035 150

## (undated) DEVICE — ST ACC MICROPUNCTURE .018 TRANSLSS/SS/TP 5F/10CM 21G/7CM

## (undated) DEVICE — TREK CORONARY DILATATION CATHETER 2.50 MM X 20 MM / RAPID-EXCHANGE: Brand: TREK

## (undated) DEVICE — GW FC FLOP/TP .035 260CM 3MM

## (undated) DEVICE — GUIDE CATHETER: Brand: MACH1™

## (undated) DEVICE — PRESSURE MONITORING SET: Brand: TRUWAVE, VAMP

## (undated) DEVICE — MODEL BT2000 P/N 700287-012KIT CONTENTS: MANIFOLD WITH SALINE AND CONTRAST PORTS, SALINE TUBING WITH SPIKE AND HAND SYRINGE, TRANSDUCER: Brand: BT2000 AUTOMATED MANIFOLD KIT

## (undated) DEVICE — DEV INFL MONARCH 25W

## (undated) DEVICE — MODEL AT P65, P/N 701554-001KIT CONTENTS: HAND CONTROLLER, 3-WAY HIGH-PRESSURE STOPCOCK WITH ROTATING END AND PREMIUM HIGH-PRESSURE TUBING: Brand: ANGIOTOUCH® KIT

## (undated) DEVICE — HI-TORQUE TURNTRAC FLEX GUIDE WIRE .014" FULLY COATED STRAIGHT TIP 190 CM: Brand: HI-TORQUE TURNTRAC

## (undated) DEVICE — CATH INTRAVAS ULTRASND EAGLE EYE 2.9FR

## (undated) DEVICE — CATH DIAG EXPO M/ PK 6FR FL4/FR4 PIG 3PK